# Patient Record
Sex: FEMALE | Race: WHITE | Employment: FULL TIME | URBAN - METROPOLITAN AREA
[De-identification: names, ages, dates, MRNs, and addresses within clinical notes are randomized per-mention and may not be internally consistent; named-entity substitution may affect disease eponyms.]

---

## 2017-03-09 ENCOUNTER — HOSPITAL ENCOUNTER (OUTPATIENT)
Dept: WOMENS IMAGING | Age: 62
Discharge: HOME | End: 2017-03-09
Payer: COMMERCIAL

## 2017-03-09 DIAGNOSIS — Z12.39 BREAST CANCER SCREENING: ICD-10-CM

## 2017-03-09 PROCEDURE — G0202 SCR MAMMO BI INCL CAD: HCPCS

## 2017-03-10 ENCOUNTER — HOSPITAL ENCOUNTER (EMERGENCY)
Age: 62
Discharge: HOME | End: 2017-03-10
Attending: EMERGENCY MEDICINE
Payer: COMMERCIAL

## 2017-03-10 ENCOUNTER — APPOINTMENT (OUTPATIENT)
Dept: GENERAL RADIOLOGY | Age: 62
End: 2017-03-10
Payer: COMMERCIAL

## 2017-03-10 VITALS
HEART RATE: 68 BPM | DIASTOLIC BLOOD PRESSURE: 77 MMHG | RESPIRATION RATE: 16 BRPM | SYSTOLIC BLOOD PRESSURE: 142 MMHG | OXYGEN SATURATION: 97 % | TEMPERATURE: 98.7 F

## 2017-03-10 DIAGNOSIS — M77.8 TENDINITIS OF THUMB: Primary | ICD-10-CM

## 2017-03-10 PROCEDURE — 73110 X-RAY EXAM OF WRIST: CPT

## 2017-03-10 PROCEDURE — 99283 EMERGENCY DEPT VISIT LOW MDM: CPT

## 2017-03-10 RX ORDER — NAPROXEN 500 MG/1
500 TABLET ORAL 2 TIMES DAILY
Qty: 20 TABLET | Refills: 0 | Status: SHIPPED | OUTPATIENT
Start: 2017-03-10 | End: 2018-12-13 | Stop reason: ALTCHOICE

## 2017-03-10 RX ORDER — PREDNISONE 20 MG/1
20 TABLET ORAL DAILY
Qty: 5 TABLET | Refills: 0 | Status: SHIPPED | OUTPATIENT
Start: 2017-03-10 | End: 2017-03-15

## 2017-03-10 ASSESSMENT — PAIN DESCRIPTION - FREQUENCY: FREQUENCY: CONTINUOUS

## 2017-03-10 ASSESSMENT — ENCOUNTER SYMPTOMS
VOICE CHANGE: 0
CHOKING: 0
EYE PAIN: 0
TROUBLE SWALLOWING: 0
FACIAL SWELLING: 0
BACK PAIN: 0
COUGH: 0
EYE DISCHARGE: 0
BLOOD IN STOOL: 0
CONSTIPATION: 0
SHORTNESS OF BREATH: 0
STRIDOR: 0
ABDOMINAL PAIN: 0
SINUS PRESSURE: 0
VOMITING: 0
EYE REDNESS: 0
SORE THROAT: 0
WHEEZING: 0
DIARRHEA: 0
CHEST TIGHTNESS: 0

## 2017-03-10 ASSESSMENT — PAIN DESCRIPTION - DIRECTION: RADIATING_TOWARDS: THUMB

## 2017-03-10 ASSESSMENT — PAIN DESCRIPTION - ORIENTATION: ORIENTATION: LEFT

## 2017-03-10 ASSESSMENT — PAIN DESCRIPTION - LOCATION: LOCATION: WRIST

## 2017-03-10 ASSESSMENT — PAIN DESCRIPTION - ONSET: ONSET: ON-GOING

## 2017-03-10 ASSESSMENT — PAIN DESCRIPTION - PAIN TYPE: TYPE: CHRONIC PAIN

## 2017-03-10 ASSESSMENT — PAIN SCALES - GENERAL: PAINLEVEL_OUTOF10: 3

## 2017-03-10 ASSESSMENT — PAIN DESCRIPTION - DESCRIPTORS: DESCRIPTORS: CONSTANT;SHARP

## 2018-03-14 ENCOUNTER — HOSPITAL ENCOUNTER (OUTPATIENT)
Dept: WOMENS IMAGING | Age: 63
Discharge: HOME | End: 2018-03-16
Payer: COMMERCIAL

## 2018-03-14 DIAGNOSIS — Z12.31 ENCOUNTER FOR SCREENING MAMMOGRAM FOR BREAST CANCER: ICD-10-CM

## 2018-03-14 PROCEDURE — 77067 SCR MAMMO BI INCL CAD: CPT

## 2018-08-09 ENCOUNTER — HOSPITAL ENCOUNTER (OUTPATIENT)
Age: 63
Setting detail: SPECIMEN
Discharge: HOME | End: 2018-08-09
Payer: COMMERCIAL

## 2018-08-09 ENCOUNTER — OFFICE VISIT (OUTPATIENT)
Dept: FAMILY MEDICINE CLINIC | Age: 63
End: 2018-08-09
Payer: COMMERCIAL

## 2018-08-09 VITALS
HEART RATE: 78 BPM | BODY MASS INDEX: 33.16 KG/M2 | SYSTOLIC BLOOD PRESSURE: 138 MMHG | HEIGHT: 67 IN | TEMPERATURE: 97.6 F | DIASTOLIC BLOOD PRESSURE: 88 MMHG | WEIGHT: 211.25 LBS | RESPIRATION RATE: 14 BRPM

## 2018-08-09 DIAGNOSIS — Z13.1 SCREENING FOR DIABETES MELLITUS (DM): ICD-10-CM

## 2018-08-09 DIAGNOSIS — R30.0 DYSURIA: Primary | ICD-10-CM

## 2018-08-09 DIAGNOSIS — I10 ESSENTIAL HYPERTENSION: ICD-10-CM

## 2018-08-09 DIAGNOSIS — Z12.4 CERVICAL CANCER SCREENING: ICD-10-CM

## 2018-08-09 DIAGNOSIS — R07.9 CHEST PAIN, UNSPECIFIED TYPE: ICD-10-CM

## 2018-08-09 DIAGNOSIS — Z11.59 NEED FOR HEPATITIS C SCREENING TEST: ICD-10-CM

## 2018-08-09 DIAGNOSIS — Z23 NEED FOR VACCINATION: ICD-10-CM

## 2018-08-09 DIAGNOSIS — Z12.11 COLON CANCER SCREENING: ICD-10-CM

## 2018-08-09 DIAGNOSIS — Z11.4 ENCOUNTER FOR SCREENING FOR HIV: ICD-10-CM

## 2018-08-09 PROBLEM — N19 RENAL FAILURE SYNDROME: Status: ACTIVE | Noted: 2018-08-09

## 2018-08-09 PROBLEM — J45.909 ASTHMA: Status: ACTIVE | Noted: 2018-08-09

## 2018-08-09 PROBLEM — J30.9 ALLERGIC RHINITIS: Status: ACTIVE | Noted: 2018-08-09

## 2018-08-09 PROBLEM — R33.9 RETENTION OF URINE: Status: ACTIVE | Noted: 2018-08-09

## 2018-08-09 LAB
BACTERIA: ABNORMAL /HPF
BILIRUBIN URINE: NEGATIVE
BILIRUBIN, POC: ABNORMAL
BLOOD URINE, POC: ABNORMAL
BLOOD, URINE: ABNORMAL
CLARITY, POC: ABNORMAL
CLARITY: ABNORMAL
COLOR, POC: YELLOW
COLOR: YELLOW
EPITHELIAL CELLS, UA: ABNORMAL /HPF
GLUCOSE URINE, POC: ABNORMAL
GLUCOSE URINE: NEGATIVE MG/DL
KETONES, POC: ABNORMAL
KETONES, URINE: NEGATIVE MG/DL
LEUKOCYTE EST, POC: ABNORMAL
LEUKOCYTE ESTERASE, URINE: ABNORMAL
NITRITE, POC: ABNORMAL
NITRITE, URINE: NEGATIVE
PH UA: 6 (ref 5–9)
PH, POC: 6
PROTEIN UA: ABNORMAL MG/DL
PROTEIN, POC: ABNORMAL
RBC UA: ABNORMAL /HPF (ref 0–2)
SPECIFIC GRAVITY UA: 1.01 (ref 1–1.03)
SPECIFIC GRAVITY, POC: 1.01
UROBILINOGEN, POC: 3.5
UROBILINOGEN, URINE: 0.2 E.U./DL
WBC UA: ABNORMAL /HPF (ref 0–5)

## 2018-08-09 PROCEDURE — 93000 ELECTROCARDIOGRAM COMPLETE: CPT | Performed by: FAMILY MEDICINE

## 2018-08-09 PROCEDURE — 81003 URINALYSIS AUTO W/O SCOPE: CPT | Performed by: FAMILY MEDICINE

## 2018-08-09 PROCEDURE — 87186 SC STD MICRODIL/AGAR DIL: CPT

## 2018-08-09 PROCEDURE — 87077 CULTURE AEROBIC IDENTIFY: CPT

## 2018-08-09 PROCEDURE — 87086 URINE CULTURE/COLONY COUNT: CPT

## 2018-08-09 PROCEDURE — 99203 OFFICE O/P NEW LOW 30 MIN: CPT | Performed by: FAMILY MEDICINE

## 2018-08-09 PROCEDURE — 81001 URINALYSIS AUTO W/SCOPE: CPT

## 2018-08-09 RX ORDER — AMLODIPINE BESYLATE 5 MG/1
5 TABLET ORAL DAILY
Qty: 90 TABLET | Refills: 1 | Status: SHIPPED | OUTPATIENT
Start: 2018-08-09 | End: 2018-09-13

## 2018-08-09 RX ORDER — AMLODIPINE BESYLATE 5 MG/1
5 TABLET ORAL
COMMUNITY
End: 2018-08-09 | Stop reason: SDUPTHER

## 2018-08-09 RX ORDER — SULFAMETHOXAZOLE AND TRIMETHOPRIM 800; 160 MG/1; MG/1
1 TABLET ORAL 2 TIMES DAILY
Qty: 10 TABLET | Refills: 0 | Status: SHIPPED | OUTPATIENT
Start: 2018-08-09 | End: 2018-08-14

## 2018-08-09 RX ORDER — TAMSULOSIN HYDROCHLORIDE 0.4 MG/1
CAPSULE ORAL
COMMUNITY
Start: 2018-06-24 | End: 2019-06-13 | Stop reason: ALTCHOICE

## 2018-08-09 ASSESSMENT — PATIENT HEALTH QUESTIONNAIRE - PHQ9
2. FEELING DOWN, DEPRESSED OR HOPELESS: 0
1. LITTLE INTEREST OR PLEASURE IN DOING THINGS: 0
SUM OF ALL RESPONSES TO PHQ QUESTIONS 1-9: 0
SUM OF ALL RESPONSES TO PHQ QUESTIONS 1-9: 0
SUM OF ALL RESPONSES TO PHQ9 QUESTIONS 1 & 2: 0

## 2018-08-10 ENCOUNTER — HOSPITAL ENCOUNTER (OUTPATIENT)
Dept: LAB | Age: 63
Discharge: HOME | End: 2018-08-10
Payer: COMMERCIAL

## 2018-08-10 DIAGNOSIS — Z11.4 ENCOUNTER FOR SCREENING FOR HIV: ICD-10-CM

## 2018-08-10 DIAGNOSIS — Z13.1 SCREENING FOR DIABETES MELLITUS (DM): ICD-10-CM

## 2018-08-10 DIAGNOSIS — N30.00 ACUTE CYSTITIS WITHOUT HEMATURIA: ICD-10-CM

## 2018-08-10 DIAGNOSIS — I10 ESSENTIAL HYPERTENSION: ICD-10-CM

## 2018-08-10 DIAGNOSIS — R30.0 DYSURIA: ICD-10-CM

## 2018-08-10 DIAGNOSIS — Z11.59 NEED FOR HEPATITIS C SCREENING TEST: ICD-10-CM

## 2018-08-10 LAB
ALBUMIN SERPL-MCNC: 4 G/DL (ref 3.9–4.9)
ALP BLD-CCNC: 104 U/L (ref 40–130)
ALT SERPL-CCNC: 13 U/L (ref 0–33)
ANION GAP SERPL CALCULATED.3IONS-SCNC: 14 MEQ/L (ref 7–13)
AST SERPL-CCNC: 18 U/L (ref 0–35)
BASOPHILS ABSOLUTE: 0.1 K/UL (ref 0–0.2)
BASOPHILS RELATIVE PERCENT: 0.8 %
BILIRUB SERPL-MCNC: 0.5 MG/DL (ref 0–1.2)
BUN BLDV-MCNC: 14 MG/DL (ref 8–23)
CALCIUM SERPL-MCNC: 9.1 MG/DL (ref 8.6–10.2)
CHLORIDE BLD-SCNC: 102 MEQ/L (ref 98–107)
CHOLESTEROL, TOTAL: 209 MG/DL (ref 0–199)
CO2: 25 MEQ/L (ref 22–29)
CREAT SERPL-MCNC: 1.41 MG/DL (ref 0.5–0.9)
EOSINOPHILS ABSOLUTE: 0.2 K/UL (ref 0–0.7)
EOSINOPHILS RELATIVE PERCENT: 2.5 %
GFR AFRICAN AMERICAN: 45.6
GFR NON-AFRICAN AMERICAN: 37.7
GLOBULIN: 3 G/DL (ref 2.3–3.5)
GLUCOSE BLD-MCNC: 150 MG/DL (ref 74–109)
HBA1C MFR BLD: 7.4 % (ref 4.8–5.9)
HCT VFR BLD CALC: 39.2 % (ref 37–47)
HDLC SERPL-MCNC: 54 MG/DL (ref 40–59)
HEMOGLOBIN: 13.5 G/DL (ref 12–16)
HEPATITIS C ANTIBODY INTERPRETATION: NORMAL
LDL CHOLESTEROL CALCULATED: 124 MG/DL (ref 0–129)
LYMPHOCYTES ABSOLUTE: 1.9 K/UL (ref 1–4.8)
LYMPHOCYTES RELATIVE PERCENT: 28.9 %
MCH RBC QN AUTO: 32.2 PG (ref 27–31.3)
MCHC RBC AUTO-ENTMCNC: 34.5 % (ref 33–37)
MCV RBC AUTO: 93.5 FL (ref 82–100)
MONOCYTES ABSOLUTE: 0.5 K/UL (ref 0.2–0.8)
MONOCYTES RELATIVE PERCENT: 7.3 %
NEUTROPHILS ABSOLUTE: 4.1 K/UL (ref 1.4–6.5)
NEUTROPHILS RELATIVE PERCENT: 60.5 %
PDW BLD-RTO: 13.2 % (ref 11.5–14.5)
PLATELET # BLD: 290 K/UL (ref 130–400)
POTASSIUM SERPL-SCNC: 4 MEQ/L (ref 3.5–5.1)
RBC # BLD: 4.19 M/UL (ref 4.2–5.4)
SODIUM BLD-SCNC: 141 MEQ/L (ref 132–144)
TOTAL PROTEIN: 7 G/DL (ref 6.4–8.1)
TRIGL SERPL-MCNC: 157 MG/DL (ref 0–200)
WBC # BLD: 6.7 K/UL (ref 4.8–10.8)

## 2018-08-10 PROCEDURE — 85025 COMPLETE CBC W/AUTO DIFF WBC: CPT

## 2018-08-10 PROCEDURE — 83036 HEMOGLOBIN GLYCOSYLATED A1C: CPT

## 2018-08-10 PROCEDURE — 80061 LIPID PANEL: CPT

## 2018-08-10 PROCEDURE — 80053 COMPREHEN METABOLIC PANEL: CPT

## 2018-08-10 PROCEDURE — 86803 HEPATITIS C AB TEST: CPT

## 2018-08-10 PROCEDURE — 36415 COLL VENOUS BLD VENIPUNCTURE: CPT

## 2018-08-10 PROCEDURE — 86703 HIV-1/HIV-2 1 RESULT ANTBDY: CPT

## 2018-08-12 LAB
HIV-1 AND HIV-2 ANTIBODIES: NEGATIVE
ORGANISM: ABNORMAL
URINE CULTURE, ROUTINE: ABNORMAL
URINE CULTURE, ROUTINE: ABNORMAL

## 2018-08-12 ASSESSMENT — ENCOUNTER SYMPTOMS
VOMITING: 0
CHEST TIGHTNESS: 0
BLURRED VISION: 0
DIARRHEA: 0
APNEA: 0
NAUSEA: 0
BLOOD IN STOOL: 0
SHORTNESS OF BREATH: 0
CONSTIPATION: 0
ORTHOPNEA: 0

## 2018-08-12 NOTE — PROGRESS NOTES
HENT:   Head: Normocephalic and atraumatic. Eyes: Pupils are equal, round, and reactive to light. Conjunctivae and EOM are normal.   Neck: Normal range of motion. Cardiovascular: Normal rate, regular rhythm and normal heart sounds. Exam reveals no gallop and no friction rub. No murmur heard. Pulmonary/Chest: Effort normal and breath sounds normal. No respiratory distress. She has no wheezes. She has no rales. She exhibits no tenderness. Abdominal: Soft. Bowel sounds are normal. She exhibits no distension and no mass. There is no tenderness. There is no rebound and no guarding. Neurological: She is alert and oriented to person, place, and time. Skin: Skin is warm and dry. She is not diaphoretic. Psychiatric: She has a normal mood and affect. Her behavior is normal. Judgment and thought content normal.   Nursing note and vitals reviewed. Assessment & Plan:      1. Dysuria  Treat as acute UTI due to urinalysis  Follow-up with urology  - POCT Urinalysis No Micro (Auto)  - Urinalysis; Future  - CBC Auto Differential; Future  - sulfamethoxazole-trimethoprim (BACTRIM DS) 800-160 MG per tablet; Take 1 tablet by mouth 2 times daily for 5 days  Dispense: 10 tablet; Refill: 0    2. Essential hypertension  Stable after repeat blood pressure  Continue current medications  - amLODIPine (NORVASC) 5 MG tablet; Take 1 tablet by mouth daily  Dispense: 90 tablet; Refill: 1  - CBC With Auto Differential; Future  - Comprehensive Metabolic Panel; Future  - Lipid Panel; Future  - Comprehensive Metabolic Panel; Future  - Lipid Panel; Future    3. Chest pain, unspecified type  Due to atypical nature of chest pain and cardiac history will refer to cardiologist  - EKG [address] [place] [address] MD Phil Rios) - Invasive Cardiology    4. Cervical cancer screening    - Ambulatory referral to Obstetrics / Gynecology        6.  Encounter for screening for HIV    - HIV-1,-2 w/Reflex to HIV-1 Western Blot;

## 2018-08-20 PROBLEM — R07.9 CHEST PAIN, UNSPECIFIED: Status: ACTIVE | Noted: 2018-08-20

## 2018-08-21 ENCOUNTER — OFFICE VISIT (OUTPATIENT)
Dept: CARDIOLOGY CLINIC | Age: 63
End: 2018-08-21
Payer: COMMERCIAL

## 2018-08-21 VITALS
HEART RATE: 69 BPM | RESPIRATION RATE: 12 BRPM | BODY MASS INDEX: 33.12 KG/M2 | SYSTOLIC BLOOD PRESSURE: 138 MMHG | WEIGHT: 211 LBS | OXYGEN SATURATION: 99 % | HEIGHT: 67 IN | DIASTOLIC BLOOD PRESSURE: 88 MMHG

## 2018-08-21 DIAGNOSIS — R42 DIZZINESS: ICD-10-CM

## 2018-08-21 DIAGNOSIS — R06.02 SOB (SHORTNESS OF BREATH): ICD-10-CM

## 2018-08-21 DIAGNOSIS — R51.9 NONINTRACTABLE HEADACHE, UNSPECIFIED CHRONICITY PATTERN, UNSPECIFIED HEADACHE TYPE: ICD-10-CM

## 2018-08-21 DIAGNOSIS — I10 ESSENTIAL HYPERTENSION: ICD-10-CM

## 2018-08-21 DIAGNOSIS — R07.89 OTHER CHEST PAIN: Primary | ICD-10-CM

## 2018-08-21 PROCEDURE — 99244 OFF/OP CNSLTJ NEW/EST MOD 40: CPT | Performed by: INTERNAL MEDICINE

## 2018-08-21 ASSESSMENT — ENCOUNTER SYMPTOMS
APNEA: 0
FACIAL SWELLING: 0
VOMITING: 0
CHEST TIGHTNESS: 0
TROUBLE SWALLOWING: 0
BLOOD IN STOOL: 0
ABDOMINAL DISTENTION: 0
NAUSEA: 0
WHEEZING: 0
DIARRHEA: 0
ABDOMINAL PAIN: 0
COLOR CHANGE: 0
VOICE CHANGE: 0
COUGH: 0
SHORTNESS OF BREATH: 1
ANAL BLEEDING: 0

## 2018-08-21 NOTE — PROGRESS NOTES
pulses. PMI is not displaced. No murmur heard. Pulmonary/Chest: She has no wheezes. She has no rales. She exhibits no tenderness. Abdominal: Soft. Bowel sounds are normal. She exhibits no distension and no mass. There is no splenomegaly or hepatomegaly. There is no tenderness. No hernia. Neurological: She is alert and oriented to person, place, and time. She has normal motor skills. Gait normal.   Skin: Skin is warm and dry. No cyanosis. No jaundice. Nails show no clubbing.        LABS:  CBC:   Lab Results   Component Value Date    WBC 6.7 08/10/2018    RBC 4.19 08/10/2018    HGB 13.5 08/10/2018    HCT 39.2 08/10/2018    MCV 93.5 08/10/2018    MCH 32.2 08/10/2018    MCHC 34.5 08/10/2018    RDW 13.2 08/10/2018     08/10/2018    MPV 7.5 10/12/2015     Lipids:  Lab Results   Component Value Date    CHOL 209 (H) 08/10/2018     Lab Results   Component Value Date    TRIG 157 08/10/2018     Lab Results   Component Value Date    HDL 54 08/10/2018     Lab Results   Component Value Date    LDLCALC 124 08/10/2018     No results found for: LABVLDL, VLDL  No results found for: CHOLHDLRATIO  CMP:    Lab Results   Component Value Date     08/10/2018    K 4.0 08/10/2018     08/10/2018    CO2 25 08/10/2018    BUN 14 08/10/2018    CREATININE 1.41 08/10/2018    GFRAA 45.6 08/10/2018    LABGLOM 37.7 08/10/2018    GLUCOSE 150 08/10/2018    PROT 7.0 08/10/2018    LABALBU 4.0 08/10/2018    CALCIUM 9.1 08/10/2018    BILITOT 0.5 08/10/2018    ALKPHOS 104 08/10/2018    AST 18 08/10/2018    ALT 13 08/10/2018     BMP:    Lab Results   Component Value Date     08/10/2018    K 4.0 08/10/2018     08/10/2018    CO2 25 08/10/2018    BUN 14 08/10/2018    LABALBU 4.0 08/10/2018    CREATININE 1.41 08/10/2018    CALCIUM 9.1 08/10/2018    GFRAA 45.6 08/10/2018    LABGLOM 37.7 08/10/2018    GLUCOSE 150 08/10/2018     Magnesium:  No results found for: MG  TSH:No results found for: TSHFT4, TSH      Patient Active Problem List   Diagnosis    Urethral stricture    Retention of urine    Renal insufficiency syndrome    Renal failure syndrome    Renal cyst    Pancreas cyst    Osteoarthrosis, unspecified whether generalized or localized, lower leg    Essential hypertension    Asthma    Allergic rhinitis    Chest pain, unspecified             Assessment:    1. Other chest pain  - NM Myocardial Spect Rest Exercise or Rx; Future  - Echo 2D w doppler w color complete; Future    2. SOB (shortness of breath)  - NM Myocardial Spect Rest Exercise or Rx; Future  - Echo 2D w doppler w color complete; Future    3. Dizziness  - NM Myocardial Spect Rest Exercise or Rx; Future  - Echo 2D w doppler w color complete; Future    4. Nonintractable headache, unspecified chronicity pattern, unspecified headache type  - NM Myocardial Spect Rest Exercise or Rx; Future  - Echo 2D w doppler w color complete; Future    5. Essential hypertension  - NM Myocardial Spect Rest Exercise or Rx; Future  - Echo 2D w doppler w color complete; Future       Plan:   Patient to have lexiscan stress test and echo at [facility] in 2 weeks. Stay on same medications. Patient to see me in 1 month to discuss test results. This note was partially generated using Dragon voice recognition system, and there may be some incorrect words, spellings, punctuation that were not noticed in checking the note before saving.         Electronically signed by Renee Nichole MD on 8/21/2018 at 11:02 AM

## 2018-09-04 ENCOUNTER — HOSPITAL ENCOUNTER (OUTPATIENT)
Dept: NUCLEAR MEDICINE | Age: 63
Discharge: HOME | End: 2018-09-06
Payer: COMMERCIAL

## 2018-09-04 ENCOUNTER — HOSPITAL ENCOUNTER (OUTPATIENT)
Dept: NON INVASIVE DIAGNOSTICS | Age: 63
Discharge: HOME | End: 2018-09-04
Payer: COMMERCIAL

## 2018-09-04 VITALS — SYSTOLIC BLOOD PRESSURE: 140 MMHG | DIASTOLIC BLOOD PRESSURE: 98 MMHG | HEART RATE: 80 BPM

## 2018-09-04 DIAGNOSIS — R07.89 OTHER CHEST PAIN: ICD-10-CM

## 2018-09-04 DIAGNOSIS — R51.9 NONINTRACTABLE HEADACHE, UNSPECIFIED CHRONICITY PATTERN, UNSPECIFIED HEADACHE TYPE: ICD-10-CM

## 2018-09-04 DIAGNOSIS — R06.02 SOB (SHORTNESS OF BREATH): ICD-10-CM

## 2018-09-04 DIAGNOSIS — I10 ESSENTIAL HYPERTENSION: ICD-10-CM

## 2018-09-04 DIAGNOSIS — R42 DIZZINESS: ICD-10-CM

## 2018-09-04 LAB
LV EF: 65 %
LVEF MODALITY: NORMAL

## 2018-09-04 PROCEDURE — A9502 TC99M TETROFOSMIN: HCPCS | Performed by: INTERNAL MEDICINE

## 2018-09-04 PROCEDURE — 2580000003 HC RX 258: Performed by: INTERNAL MEDICINE

## 2018-09-04 PROCEDURE — 6360000002 HC RX W HCPCS: Performed by: INTERNAL MEDICINE

## 2018-09-04 PROCEDURE — 93306 TTE W/DOPPLER COMPLETE: CPT

## 2018-09-04 PROCEDURE — 93017 CV STRESS TEST TRACING ONLY: CPT

## 2018-09-04 PROCEDURE — 3430000000 HC RX DIAGNOSTIC RADIOPHARMACEUTICAL: Performed by: INTERNAL MEDICINE

## 2018-09-04 PROCEDURE — 78452 HT MUSCLE IMAGE SPECT MULT: CPT

## 2018-09-04 RX ORDER — SODIUM CHLORIDE 0.9 % (FLUSH) 0.9 %
10 SYRINGE (ML) INJECTION 2 TIMES DAILY
Status: DISCONTINUED | OUTPATIENT
Start: 2018-09-04 | End: 2018-09-07 | Stop reason: HOSPADM

## 2018-09-04 RX ORDER — SODIUM CHLORIDE 0.9 % (FLUSH) 0.9 %
10 SYRINGE (ML) INJECTION PRN
Status: DISCONTINUED | OUTPATIENT
Start: 2018-09-04 | End: 2018-09-07 | Stop reason: HOSPADM

## 2018-09-04 RX ADMIN — Medication 10 ML: at 08:48

## 2018-09-04 RX ADMIN — TETROFOSMIN 11.2 MILLICURIE: 0.23 INJECTION, POWDER, LYOPHILIZED, FOR SOLUTION INTRAVENOUS at 08:47

## 2018-09-04 RX ADMIN — Medication 10 ML: at 11:32

## 2018-09-04 RX ADMIN — REGADENOSON 0.4 MG: 0.08 INJECTION, SOLUTION INTRAVENOUS at 11:29

## 2018-09-04 RX ADMIN — TETROFOSMIN 28.3 MILLICURIE: 0.23 INJECTION, POWDER, LYOPHILIZED, FOR SOLUTION INTRAVENOUS at 11:31

## 2018-09-04 RX ADMIN — Medication 10 ML: at 11:30

## 2018-09-06 PROCEDURE — 93018 CV STRESS TEST I&R ONLY: CPT | Performed by: INTERNAL MEDICINE

## 2018-09-06 NOTE — PROCEDURES
[address]                                CARDIAC STRESS TEST    PATIENT NAME: Bina Sparks                   :        1955  MED REC NO:   [id number]                              ROOM:  ACCOUNT NO:   [de-identified]                           ADMIT DATE: 2018  PROVIDER:     Marilyn Hart MD    CARDIOVASCULAR DIAGNOSTIC DEPARTMENT    Sagar Monsalve PROVIDER:  Miguelina Urbano. Codi Bo, [address] [place] [address] Street:  2018. LOCATION:  [address] [place] [address] Avenue:  Chest pain. TECHNIQUE:  At rest, the patient was injected with 11.2 mCi of Myoview. Resting images were obtained. The patient was then given 0.4 mg of  Lexiscan followed by administration of 28.3 mCi of Myoview. Stress  tomographic images were _____ obtained. Left ventricular ejection fraction  and gated wall motion were acquired. RESULTS:  Resting heart rate 60 beats per minute. Maximum heart rate  achieved was 108 beats per minute. No diagnostic ST-T-wave changes were  seen. Resting blood pressure was hypertensive with a systolic blood  pressure 976 mmHg over diastolic of 075 mmHg. No diagnostic ST-T-wave  changes were seen. IMAGING RESULTS:  Review of rest and stress tomographic images revealed  homogenous myocardial perfusion with no evidence of prior myocardial  infarction or ischemia. Left ventricular ejection fraction is normal at  63%. TID ratio is 0.85 which is within normal limits. IMPRESSION:  1. Resting hypertension. 2.  Homogenous myocardial perfusion with no evidence of prior myocardial  infarction or ischemia. 3.  Normal left ventricular ejection fraction of 63%. 4.  TID ratio is 0.85 which is within normal limits.         Randall Crespo MD    D: 2018 14:51:23       T: 2018 14:53:56     [email or web address]  Job#: [id number]     Doc#: [id number]    CC:

## 2018-09-13 ENCOUNTER — HOSPITAL ENCOUNTER (OUTPATIENT)
Age: 63
Setting detail: SPECIMEN
Discharge: HOME | End: 2018-09-13
Payer: COMMERCIAL

## 2018-09-13 ENCOUNTER — OFFICE VISIT (OUTPATIENT)
Dept: FAMILY MEDICINE CLINIC | Age: 63
End: 2018-09-13
Payer: COMMERCIAL

## 2018-09-13 VITALS
SYSTOLIC BLOOD PRESSURE: 122 MMHG | WEIGHT: 213.25 LBS | BODY MASS INDEX: 33.47 KG/M2 | OXYGEN SATURATION: 99 % | HEART RATE: 71 BPM | HEIGHT: 67 IN | TEMPERATURE: 97.6 F | RESPIRATION RATE: 12 BRPM | DIASTOLIC BLOOD PRESSURE: 76 MMHG

## 2018-09-13 DIAGNOSIS — E11.9 TYPE 2 DIABETES MELLITUS WITHOUT COMPLICATION, WITHOUT LONG-TERM CURRENT USE OF INSULIN (HCC): ICD-10-CM

## 2018-09-13 DIAGNOSIS — I10 ESSENTIAL HYPERTENSION: Primary | ICD-10-CM

## 2018-09-13 DIAGNOSIS — Z23 NEED FOR VACCINATION: ICD-10-CM

## 2018-09-13 DIAGNOSIS — R30.0 DYSURIA: ICD-10-CM

## 2018-09-13 DIAGNOSIS — N18.30 STAGE 3 CHRONIC KIDNEY DISEASE (HCC): ICD-10-CM

## 2018-09-13 LAB
BACTERIA: ABNORMAL /HPF
BILIRUBIN URINE: NEGATIVE
BLOOD, URINE: ABNORMAL
CLARITY: ABNORMAL
COLOR: YELLOW
CREATININE URINE: 205.4 MG/DL
EPITHELIAL CELLS, UA: ABNORMAL /HPF
GLUCOSE URINE: 100 MG/DL
KETONES, URINE: NEGATIVE MG/DL
LEUKOCYTE ESTERASE, URINE: ABNORMAL
MICROALBUMIN UR-MCNC: 15.9 MG/DL
MICROALBUMIN/CREAT UR-RTO: 77.4 MG/G (ref 0–30)
NITRITE, URINE: POSITIVE
PH UA: 5.5 (ref 5–9)
PROTEIN UA: 30 MG/DL
RBC UA: ABNORMAL /HPF (ref 0–2)
RENAL EPITHELIAL, UA: ABNORMAL /HPF
SPECIFIC GRAVITY UA: 1.02 (ref 1–1.03)
UROBILINOGEN, URINE: 0.2 E.U./DL
WBC UA: ABNORMAL /HPF (ref 0–5)

## 2018-09-13 PROCEDURE — 82043 UR ALBUMIN QUANTITATIVE: CPT

## 2018-09-13 PROCEDURE — 82570 ASSAY OF URINE CREATININE: CPT

## 2018-09-13 PROCEDURE — 81001 URINALYSIS AUTO W/SCOPE: CPT

## 2018-09-13 PROCEDURE — 99213 OFFICE O/P EST LOW 20 MIN: CPT | Performed by: FAMILY MEDICINE

## 2018-09-13 RX ORDER — AMLODIPINE BESYLATE 10 MG/1
10 TABLET ORAL DAILY
Qty: 30 TABLET | Refills: 3 | Status: SHIPPED | OUTPATIENT
Start: 2018-09-13 | End: 2018-12-13 | Stop reason: SDUPTHER

## 2018-09-14 DIAGNOSIS — N30.00 ACUTE CYSTITIS WITHOUT HEMATURIA: Primary | ICD-10-CM

## 2018-09-14 RX ORDER — SULFAMETHOXAZOLE AND TRIMETHOPRIM 800; 160 MG/1; MG/1
1 TABLET ORAL 2 TIMES DAILY
Qty: 14 TABLET | Refills: 0 | Status: SHIPPED | OUTPATIENT
Start: 2018-09-14 | End: 2018-09-21

## 2018-09-17 ENCOUNTER — TELEPHONE (OUTPATIENT)
Dept: FAMILY MEDICINE CLINIC | Age: 63
End: 2018-09-17

## 2018-09-17 NOTE — TELEPHONE ENCOUNTER
Patient called with BP readings. Today at 8 am, 130/85 and again at today at 9:30 am 128/83. Please advise.

## 2018-09-19 ASSESSMENT — ENCOUNTER SYMPTOMS
CONSTIPATION: 0
APNEA: 0
ORTHOPNEA: 0
DIARRHEA: 0
COUGH: 0
BLOOD IN STOOL: 0
CHEST TIGHTNESS: 0
VOMITING: 0
NAUSEA: 0
BLURRED VISION: 0
SHORTNESS OF BREATH: 0
ABDOMINAL PAIN: 0

## 2018-09-25 ENCOUNTER — OFFICE VISIT (OUTPATIENT)
Dept: CARDIOLOGY CLINIC | Age: 63
End: 2018-09-25
Payer: COMMERCIAL

## 2018-09-25 VITALS
WEIGHT: 212.6 LBS | TEMPERATURE: 98.6 F | HEIGHT: 67 IN | BODY MASS INDEX: 33.37 KG/M2 | HEART RATE: 90 BPM | OXYGEN SATURATION: 98 % | DIASTOLIC BLOOD PRESSURE: 84 MMHG | SYSTOLIC BLOOD PRESSURE: 122 MMHG | RESPIRATION RATE: 22 BRPM

## 2018-09-25 DIAGNOSIS — I10 ESSENTIAL HYPERTENSION: Primary | ICD-10-CM

## 2018-09-25 PROCEDURE — 99213 OFFICE O/P EST LOW 20 MIN: CPT | Performed by: INTERNAL MEDICINE

## 2018-09-25 ASSESSMENT — ENCOUNTER SYMPTOMS
COLOR CHANGE: 0
NAUSEA: 0
APNEA: 0
CHEST TIGHTNESS: 0
BLOOD IN STOOL: 0
SHORTNESS OF BREATH: 0
DIARRHEA: 0
VOMITING: 0

## 2018-09-25 NOTE — PROGRESS NOTES
Retention of urine    Renal insufficiency syndrome    Renal failure syndrome    Renal cyst    Pancreas cyst    Osteoarthrosis, unspecified whether generalized or localized, lower leg    Essential hypertension    Asthma    Allergic rhinitis    Chest pain, unspecified       There are no discontinued medications. Modified Medications    No medications on file       No orders of the defined types were placed in this encounter. Assessment:    1. Essential hypertension       Plan:   Stay on same medications. Patient to see me in 1 year. This note was partially generated using Dragon voice recognition system, and there may be some incorrect words, spellings, punctuation that were not noticed in checking the note before saving.         Electronically signed by Santiago Karimi MD on 9/25/2018 at 2:28 PM

## 2018-10-20 ENCOUNTER — HOSPITAL ENCOUNTER (OUTPATIENT)
Dept: LAB | Age: 63
Discharge: HOME | End: 2018-10-20
Payer: COMMERCIAL

## 2018-10-20 DIAGNOSIS — N18.30 STAGE 3 CHRONIC KIDNEY DISEASE (HCC): ICD-10-CM

## 2018-10-20 LAB
ALBUMIN SERPL-MCNC: 4.3 G/DL (ref 3.9–4.9)
ANION GAP SERPL CALCULATED.3IONS-SCNC: 15 MEQ/L (ref 7–13)
ANION GAP SERPL CALCULATED.3IONS-SCNC: 19 MEQ/L (ref 7–13)
BACTERIA: ABNORMAL /HPF
BILIRUBIN URINE: NEGATIVE
BLOOD, URINE: ABNORMAL
BUN BLDV-MCNC: 15 MG/DL (ref 8–23)
BUN BLDV-MCNC: 15 MG/DL (ref 8–23)
CALCIUM SERPL-MCNC: 9.3 MG/DL (ref 8.6–10.2)
CALCIUM SERPL-MCNC: 9.3 MG/DL (ref 8.6–10.2)
CHLORIDE BLD-SCNC: 100 MEQ/L (ref 98–107)
CHLORIDE BLD-SCNC: 102 MEQ/L (ref 98–107)
CLARITY: CLEAR
CO2: 21 MEQ/L (ref 22–29)
CO2: 24 MEQ/L (ref 22–29)
COLOR: YELLOW
CREAT SERPL-MCNC: 1.2 MG/DL (ref 0.5–0.9)
CREAT SERPL-MCNC: 1.26 MG/DL (ref 0.5–0.9)
GFR AFRICAN AMERICAN: 51.9
GFR AFRICAN AMERICAN: 54.9
GFR NON-AFRICAN AMERICAN: 42.9
GFR NON-AFRICAN AMERICAN: 45.3
GLUCOSE BLD-MCNC: 153 MG/DL (ref 74–109)
GLUCOSE BLD-MCNC: 161 MG/DL (ref 74–109)
GLUCOSE URINE: NEGATIVE MG/DL
HCT VFR BLD CALC: 41.4 % (ref 37–47)
HEMOGLOBIN: 14.1 G/DL (ref 12–16)
KETONES, URINE: NEGATIVE MG/DL
LEUKOCYTE ESTERASE, URINE: ABNORMAL
MCH RBC QN AUTO: 32.2 PG (ref 27–31.3)
MCHC RBC AUTO-ENTMCNC: 34.1 % (ref 33–37)
MCV RBC AUTO: 94.4 FL (ref 82–100)
NITRITE, URINE: NEGATIVE
PDW BLD-RTO: 13.3 % (ref 11.5–14.5)
PH UA: 7 (ref 5–9)
PHOSPHORUS: 3.7 MG/DL (ref 2.5–4.5)
PLATELET # BLD: 300 K/UL (ref 130–400)
POTASSIUM SERPL-SCNC: 3.9 MEQ/L (ref 3.5–5.1)
POTASSIUM SERPL-SCNC: 3.9 MEQ/L (ref 3.5–5.1)
PROTEIN UA: ABNORMAL MG/DL
RBC # BLD: 4.38 M/UL (ref 4.2–5.4)
RBC UA: ABNORMAL /HPF (ref 0–2)
RENAL EPITHELIAL, UA: ABNORMAL /HPF
SODIUM BLD-SCNC: 139 MEQ/L (ref 132–144)
SODIUM BLD-SCNC: 142 MEQ/L (ref 132–144)
SPECIFIC GRAVITY UA: 1.02 (ref 1–1.03)
UROBILINOGEN, URINE: 0.2 E.U./DL
WBC # BLD: 5.8 K/UL (ref 4.8–10.8)
WBC UA: ABNORMAL /HPF (ref 0–5)

## 2018-10-20 PROCEDURE — 81001 URINALYSIS AUTO W/SCOPE: CPT

## 2018-10-20 PROCEDURE — 85027 COMPLETE CBC AUTOMATED: CPT

## 2018-10-20 PROCEDURE — 80069 RENAL FUNCTION PANEL: CPT

## 2018-10-20 PROCEDURE — 36415 COLL VENOUS BLD VENIPUNCTURE: CPT

## 2018-12-13 ENCOUNTER — OFFICE VISIT (OUTPATIENT)
Dept: FAMILY MEDICINE CLINIC | Age: 63
End: 2018-12-13
Payer: COMMERCIAL

## 2018-12-13 VITALS
SYSTOLIC BLOOD PRESSURE: 140 MMHG | WEIGHT: 206 LBS | BODY MASS INDEX: 32.33 KG/M2 | DIASTOLIC BLOOD PRESSURE: 80 MMHG | TEMPERATURE: 97.3 F | HEIGHT: 67 IN | OXYGEN SATURATION: 97 % | RESPIRATION RATE: 12 BRPM | HEART RATE: 58 BPM

## 2018-12-13 DIAGNOSIS — E11.9 TYPE 2 DIABETES MELLITUS WITHOUT COMPLICATION, WITHOUT LONG-TERM CURRENT USE OF INSULIN (HCC): Primary | ICD-10-CM

## 2018-12-13 DIAGNOSIS — E78.5 DYSLIPIDEMIA: ICD-10-CM

## 2018-12-13 DIAGNOSIS — I10 ESSENTIAL HYPERTENSION: ICD-10-CM

## 2018-12-13 PROCEDURE — 99214 OFFICE O/P EST MOD 30 MIN: CPT | Performed by: FAMILY MEDICINE

## 2018-12-13 RX ORDER — AMLODIPINE BESYLATE 10 MG/1
10 TABLET ORAL DAILY
Qty: 90 TABLET | Refills: 1 | Status: SHIPPED | OUTPATIENT
Start: 2018-12-13 | End: 2019-06-13 | Stop reason: SDUPTHER

## 2018-12-13 NOTE — PROGRESS NOTES
Tobacco smoker: No      Systolic Blood Pressure: 724 mmHg      Is BP treated: Yes      HDL Cholesterol: 54 mg/dL      Total Cholesterol: 209 mg/dL  There are no diagnoses linked to this encounter. No Follow-up on file.     Bijan Cueto MD

## 2018-12-21 ASSESSMENT — ENCOUNTER SYMPTOMS
ABDOMINAL PAIN: 0
VOMITING: 0
ORTHOPNEA: 0
BLURRED VISION: 0
NAUSEA: 0
CHEST TIGHTNESS: 0
APNEA: 0
CONSTIPATION: 0
DIARRHEA: 0
VISUAL CHANGE: 0
BLOOD IN STOOL: 0
COUGH: 0
SHORTNESS OF BREATH: 0

## 2019-03-19 ENCOUNTER — HOSPITAL ENCOUNTER (OUTPATIENT)
Dept: WOMENS IMAGING | Age: 64
Discharge: HOME | End: 2019-03-21
Payer: COMMERCIAL

## 2019-03-19 DIAGNOSIS — Z12.39 BREAST CANCER SCREENING: ICD-10-CM

## 2019-03-19 PROCEDURE — 77067 SCR MAMMO BI INCL CAD: CPT

## 2019-06-13 ENCOUNTER — OFFICE VISIT (OUTPATIENT)
Dept: FAMILY MEDICINE CLINIC | Age: 64
End: 2019-06-13
Payer: COMMERCIAL

## 2019-06-13 VITALS
WEIGHT: 206.8 LBS | SYSTOLIC BLOOD PRESSURE: 128 MMHG | OXYGEN SATURATION: 96 % | HEIGHT: 67 IN | TEMPERATURE: 98 F | DIASTOLIC BLOOD PRESSURE: 78 MMHG | RESPIRATION RATE: 14 BRPM | HEART RATE: 89 BPM | BODY MASS INDEX: 32.46 KG/M2

## 2019-06-13 DIAGNOSIS — N18.30 CKD (CHRONIC KIDNEY DISEASE), STAGE III (HCC): ICD-10-CM

## 2019-06-13 DIAGNOSIS — E11.9 TYPE 2 DIABETES MELLITUS WITHOUT COMPLICATION, WITHOUT LONG-TERM CURRENT USE OF INSULIN (HCC): Primary | ICD-10-CM

## 2019-06-13 DIAGNOSIS — I10 ESSENTIAL HYPERTENSION: ICD-10-CM

## 2019-06-13 LAB — HBA1C MFR BLD: 8.5 %

## 2019-06-13 PROCEDURE — 99214 OFFICE O/P EST MOD 30 MIN: CPT | Performed by: FAMILY MEDICINE

## 2019-06-13 PROCEDURE — 83036 HEMOGLOBIN GLYCOSYLATED A1C: CPT | Performed by: FAMILY MEDICINE

## 2019-06-13 RX ORDER — FLUTICASONE PROPIONATE 50 MCG
1 SPRAY, SUSPENSION (ML) NASAL DAILY
Qty: 1 BOTTLE | Refills: 0 | Status: SHIPPED | OUTPATIENT
Start: 2019-06-13 | End: 2020-11-17

## 2019-06-13 RX ORDER — AMLODIPINE BESYLATE 10 MG/1
10 TABLET ORAL DAILY
Qty: 90 TABLET | Refills: 1 | Status: SHIPPED | OUTPATIENT
Start: 2019-06-13 | End: 2020-01-22

## 2019-06-13 RX ORDER — FLUTICASONE PROPIONATE 50 MCG
1 SPRAY, SUSPENSION (ML) NASAL
COMMUNITY
Start: 2013-04-12 | End: 2019-06-13 | Stop reason: SDUPTHER

## 2019-06-13 RX ORDER — LISINOPRIL 20 MG/1
10 TABLET ORAL
COMMUNITY
Start: 2014-09-16 | End: 2019-06-13 | Stop reason: ALTCHOICE

## 2019-06-13 RX ORDER — BLOOD PRESSURE TEST KIT
KIT MISCELLANEOUS
Qty: 1 EACH | Refills: 5 | Status: SHIPPED | OUTPATIENT
Start: 2019-06-13

## 2019-06-13 RX ORDER — LANCETS 30 GAUGE
EACH MISCELLANEOUS
Qty: 1 EACH | Refills: 5 | Status: SHIPPED | OUTPATIENT
Start: 2019-06-13 | End: 2020-08-07 | Stop reason: SDUPTHER

## 2019-06-13 RX ORDER — GLUCOSAMINE HCL/CHONDROITIN SU 500-400 MG
CAPSULE ORAL
Qty: 100 STRIP | Refills: 5 | Status: SHIPPED | OUTPATIENT
Start: 2019-06-13 | End: 2020-08-07 | Stop reason: SDUPTHER

## 2019-06-13 NOTE — PROGRESS NOTES
Subjective:      Patient ID: Chrissy Canchola is a 61 y.o. female who presents today for:     Chief Complaint   Patient presents with    Hypertension     Patient presents with 6 month follow up HTN, is currently taking amlodipine 10 MG daily with no side effects.  Diabetes     Patient presents with 6 month follow up and to have her A1C rechecked. Hypertension   This is a chronic problem. The problem is controlled. Pertinent negatives include no anxiety, blurred vision, chest pain, headaches, malaise/fatigue, neck pain, orthopnea, palpitations, peripheral edema, PND, shortness of breath or sweats. The current treatment provides significant improvement. Hypertensive end-organ damage includes kidney disease. There is no history of angina, CAD/MI, CVA, heart failure, left ventricular hypertrophy, PVD or retinopathy. Diabetes   She presents for her follow-up diabetic visit. She has type 2 diabetes mellitus. Her disease course has been worsening. There are no hypoglycemic associated symptoms. Pertinent negatives for hypoglycemia include no confusion, dizziness, headaches, seizures or sweats. Pertinent negatives for diabetes include no blurred vision, no chest pain, no fatigue, no foot paresthesias, no foot ulcerations, no polydipsia, no polyphagia, no polyuria, no visual change, no weakness and no weight loss. Pertinent negatives for diabetic complications include no CVA, PVD or retinopathy. Current diabetic treatment includes diet. She is following a generally healthy diet. When asked about meal planning, she reported none. She rarely participates in exercise. An ACE inhibitor/angiotensin II receptor blocker is being taken. Eye exam is not current.        Past Medical History:   Diagnosis Date    Allergic rhinitis     Asthma     Chest pain, unspecified 8/20/2018    Hypertension     Kidney problem     blockage in urethra and urine backs up has to get urethra dilated, has scar tissue     Past Surgical History:   Procedure Laterality Date    CHOLECYSTECTOMY  1990    HYSTERECTOMY  2011     No family history on file. Social History     Socioeconomic History    Marital status:      Spouse name: Not on file    Number of children: Not on file    Years of education: Not on file    Highest education level: Not on file   Occupational History    Not on file   Social Needs    Financial resource strain: Not on file    Food insecurity:     Worry: Not on file     Inability: Not on file    Transportation needs:     Medical: Not on file     Non-medical: Not on file   Tobacco Use    Smoking status: Never Smoker    Smokeless tobacco: Never Used   Substance and Sexual Activity    Alcohol use: No    Drug use: No    Sexual activity: Not on file   Lifestyle    Physical activity:     Days per week: Not on file     Minutes per session: Not on file    Stress: Not on file   Relationships    Social connections:     Talks on phone: Not on file     Gets together: Not on file     Attends Sabianism service: Not on file     Active member of club or organization: Not on file     Attends meetings of clubs or organizations: Not on file     Relationship status: Not on file    Intimate partner violence:     Fear of current or ex partner: Not on file     Emotionally abused: Not on file     Physically abused: Not on file     Forced sexual activity: Not on file   Other Topics Concern    Not on file   Social History Narrative    Not on file     Current Outpatient Medications on File Prior to Visit   Medication Sig Dispense Refill    aspirin 81 MG tablet Take 81 mg by mouth daily       No current facility-administered medications on file prior to visit. Allergies:  Ciprofloxacin    Review of Systems   Constitutional: Negative for activity change, appetite change, fatigue, malaise/fatigue and weight loss. Eyes: Negative for blurred vision.    Respiratory: Negative for apnea, cough, chest tightness and shortness of

## 2019-06-18 ENCOUNTER — TELEPHONE (OUTPATIENT)
Dept: FAMILY MEDICINE CLINIC | Age: 64
End: 2019-06-18

## 2019-06-18 DIAGNOSIS — N18.30 TYPE 2 DIABETES MELLITUS WITH STAGE 3 CHRONIC KIDNEY DISEASE, WITHOUT LONG-TERM CURRENT USE OF INSULIN (HCC): Primary | Chronic | ICD-10-CM

## 2019-06-18 DIAGNOSIS — E11.22 TYPE 2 DIABETES MELLITUS WITH STAGE 3 CHRONIC KIDNEY DISEASE, WITHOUT LONG-TERM CURRENT USE OF INSULIN (HCC): Primary | Chronic | ICD-10-CM

## 2019-06-18 NOTE — TELEPHONE ENCOUNTER
Patient called wanted to know if you talked to Dr Imtiaz Solano to see what kind of medication to give her. She has been waiting to see what medication that you are going to give her from talking to Dr Imtiaz Solano. cp

## 2019-06-20 ASSESSMENT — ENCOUNTER SYMPTOMS
SHORTNESS OF BREATH: 0
COUGH: 0
BLOOD IN STOOL: 0
BLURRED VISION: 0
ABDOMINAL PAIN: 0
APNEA: 0
NAUSEA: 0
CHEST TIGHTNESS: 0
VISUAL CHANGE: 0
VOMITING: 0
DIARRHEA: 0
CONSTIPATION: 0
ORTHOPNEA: 0

## 2019-06-20 NOTE — TELEPHONE ENCOUNTER
Called Dr. David Chiang office at [phone] and they are sending message to him now and will call us back to let us know what he says.

## 2019-06-20 NOTE — TELEPHONE ENCOUNTER
Can we please call Dr. Can Fitting office.  Patient is a new diabetic and I would like to know whether glipizide 2.5 mg extended release is an option  As metformin cannot be used due to last kidney function obtained

## 2019-06-21 PROBLEM — E11.22 TYPE 2 DIABETES MELLITUS WITH CHRONIC KIDNEY DISEASE, WITHOUT LONG-TERM CURRENT USE OF INSULIN (HCC): Chronic | Status: ACTIVE | Noted: 2019-06-21

## 2019-06-21 RX ORDER — ATORVASTATIN CALCIUM 10 MG/1
10 TABLET, FILM COATED ORAL DAILY
Qty: 30 TABLET | Refills: 1 | Status: SHIPPED | OUTPATIENT
Start: 2019-06-21 | End: 2019-08-14 | Stop reason: SDUPTHER

## 2019-06-21 RX ORDER — GLIPIZIDE 2.5 MG/1
2.5 TABLET, EXTENDED RELEASE ORAL DAILY
Qty: 30 TABLET | Refills: 1 | Status: SHIPPED | OUTPATIENT
Start: 2019-06-21 | End: 2019-08-14 | Stop reason: SDUPTHER

## 2019-06-21 NOTE — TELEPHONE ENCOUNTER
Patient is aware. She said it was also supposed to be for the cholesterol medication too. She would like these medications sent to [address] [facility] in [facility].

## 2019-06-21 NOTE — TELEPHONE ENCOUNTER
Patient can start glipizide 2.5 mg extended release per nephrology.  Please check with patient and we will place the order for the medication

## 2019-07-06 ENCOUNTER — HOSPITAL ENCOUNTER (OUTPATIENT)
Dept: LAB | Age: 64
Discharge: HOME | End: 2019-07-06
Payer: COMMERCIAL

## 2019-07-06 LAB
ANION GAP SERPL CALCULATED.3IONS-SCNC: 13 MEQ/L (ref 9–15)
BUN BLDV-MCNC: 15 MG/DL (ref 8–23)
CALCIUM SERPL-MCNC: 9 MG/DL (ref 8.5–9.9)
CHLORIDE BLD-SCNC: 105 MEQ/L (ref 95–107)
CO2: 24 MEQ/L (ref 20–31)
CREAT SERPL-MCNC: 1.36 MG/DL (ref 0.5–0.9)
GFR AFRICAN AMERICAN: 47.4
GFR NON-AFRICAN AMERICAN: 39.2
GLUCOSE BLD-MCNC: 154 MG/DL (ref 70–99)
POTASSIUM SERPL-SCNC: 4.3 MEQ/L (ref 3.4–4.9)
SODIUM BLD-SCNC: 142 MEQ/L (ref 135–144)

## 2019-07-06 PROCEDURE — 36415 COLL VENOUS BLD VENIPUNCTURE: CPT

## 2019-07-06 PROCEDURE — 80048 BASIC METABOLIC PNL TOTAL CA: CPT

## 2019-08-01 ENCOUNTER — OFFICE VISIT (OUTPATIENT)
Dept: OBGYN CLINIC | Age: 64
End: 2019-08-01
Payer: COMMERCIAL

## 2019-08-01 VITALS — SYSTOLIC BLOOD PRESSURE: 130 MMHG | DIASTOLIC BLOOD PRESSURE: 84 MMHG | BODY MASS INDEX: 31.97 KG/M2 | WEIGHT: 204.1 LBS

## 2019-08-01 DIAGNOSIS — Z11.51 SCREENING FOR HPV (HUMAN PAPILLOMAVIRUS): ICD-10-CM

## 2019-08-01 DIAGNOSIS — Z85.42 H/O MALIGNANT NEOPLASM OF ENDOMETRIUM: ICD-10-CM

## 2019-08-01 DIAGNOSIS — Z01.419 WOMEN'S ANNUAL ROUTINE GYNECOLOGICAL EXAMINATION: Primary | ICD-10-CM

## 2019-08-01 PROCEDURE — 99386 PREV VISIT NEW AGE 40-64: CPT | Performed by: OBSTETRICS & GYNECOLOGY

## 2019-08-01 ASSESSMENT — ENCOUNTER SYMPTOMS
SORE THROAT: 0
SHORTNESS OF BREATH: 0
CONSTIPATION: 0
COUGH: 0
ABDOMINAL DISTENTION: 0
ABDOMINAL PAIN: 0
BLOOD IN STOOL: 0
DIARRHEA: 0
VOMITING: 0
NAUSEA: 0
WHEEZING: 0

## 2019-08-01 ASSESSMENT — PATIENT HEALTH QUESTIONNAIRE - PHQ9
SUM OF ALL RESPONSES TO PHQ QUESTIONS 1-9: 0
SUM OF ALL RESPONSES TO PHQ QUESTIONS 1-9: 0
2. FEELING DOWN, DEPRESSED OR HOPELESS: 0
SUM OF ALL RESPONSES TO PHQ9 QUESTIONS 1 & 2: 0
1. LITTLE INTEREST OR PLEASURE IN DOING THINGS: 0

## 2019-08-01 NOTE — PROGRESS NOTES
The patient was asked if she would like a chaperone present for her intimate exam. She  Declined the chaperone.  Cande Kunz
easily. Psychiatric/Behavioral: Negative for agitation, confusion and sleep disturbance. All other systems reviewed and are negative. Objective:     Vitals:  /84   Wt 204 lb 1.6 oz (92.6 kg)   LMP  (LMP Unknown)   BMI 31.97 kg/m²     Physical Exam  Physical Exam   Constitutional: She is oriented to person, place, and time. She appears well-developed and well-nourished. No distress. HENT:   Head: Normocephalic. Right Ear: External ear normal.   Left Ear: External ear normal.   Nose: Nose normal.   Eyes: Pupils are equal, round, and reactive to light. Conjunctivae and EOM are normal.   Neck: No tracheal deviation present. No thyromegaly present. Cardiovascular: Normal rate, regular rhythm, normal heart sounds and intact distal pulses. Exam reveals no gallop and no friction rub. No murmur heard. Pulmonary/Chest: Effort normal and breath sounds normal. No respiratory distress. She has no wheezes. She has no rales. She exhibits no tenderness. Right breast exhibits no inverted nipple, no mass and no tenderness. Left breast exhibits no inverted nipple, no mass and no tenderness. No breast tenderness, discharge or bleeding. Abdominal: Soft. Bowel sounds are normal. She exhibits no distension and no mass. There is no tenderness. There is no rebound and no guarding. Genitourinary: Vagina normal. No breast tenderness, discharge or bleeding. There is no rash, tenderness or lesion on the right labia. There is no rash, tenderness or lesion on the left labia. Right adnexum displays no mass. Left adnexum displays no mass. No erythema, tenderness or bleeding in the vagina. No vaginal discharge found. Genitourinary Comments: Uterus is not prolapsed and there is not a cystocele or rectocele noted   Musculoskeletal: She exhibits no edema. Lymphadenopathy:        Right: No inguinal adenopathy present. Left: No inguinal adenopathy present.    Neurological: She is alert and oriented to person,

## 2019-08-07 ENCOUNTER — TELEPHONE (OUTPATIENT)
Dept: OBGYN CLINIC | Age: 64
End: 2019-08-07

## 2019-08-07 LAB — PAP SMEAR: NORMAL

## 2019-08-07 RX ORDER — NYSTATIN 100000 U/G
CREAM TOPICAL
Qty: 1 TUBE | Refills: 1 | Status: SHIPPED | OUTPATIENT
Start: 2019-08-07 | End: 2019-08-24 | Stop reason: SDUPTHER

## 2019-08-14 DIAGNOSIS — N18.30 TYPE 2 DIABETES MELLITUS WITH STAGE 3 CHRONIC KIDNEY DISEASE, WITHOUT LONG-TERM CURRENT USE OF INSULIN (HCC): Chronic | ICD-10-CM

## 2019-08-14 DIAGNOSIS — E11.22 TYPE 2 DIABETES MELLITUS WITH STAGE 3 CHRONIC KIDNEY DISEASE, WITHOUT LONG-TERM CURRENT USE OF INSULIN (HCC): Chronic | ICD-10-CM

## 2019-08-19 RX ORDER — GLIPIZIDE 2.5 MG/1
2.5 TABLET, EXTENDED RELEASE ORAL DAILY
Qty: 90 TABLET | Refills: 1 | Status: SHIPPED | OUTPATIENT
Start: 2019-08-19 | End: 2019-08-19 | Stop reason: SDUPTHER

## 2019-08-19 RX ORDER — ATORVASTATIN CALCIUM 10 MG/1
10 TABLET, FILM COATED ORAL DAILY
Qty: 90 TABLET | Refills: 0 | Status: SHIPPED | OUTPATIENT
Start: 2019-08-19 | End: 2020-02-12

## 2019-08-19 RX ORDER — ATORVASTATIN CALCIUM 10 MG/1
10 TABLET, FILM COATED ORAL DAILY
Qty: 90 TABLET | Refills: 0 | Status: SHIPPED | OUTPATIENT
Start: 2019-08-19 | End: 2019-08-19 | Stop reason: SDUPTHER

## 2019-08-19 RX ORDER — GLIPIZIDE 2.5 MG/1
2.5 TABLET, EXTENDED RELEASE ORAL DAILY
Qty: 90 TABLET | Refills: 1 | Status: SHIPPED | OUTPATIENT
Start: 2019-08-19 | End: 2020-08-12

## 2019-08-24 RX ORDER — NYSTATIN 100000 U/G
CREAM TOPICAL
Qty: 1 TUBE | Refills: 1 | Status: SHIPPED | OUTPATIENT
Start: 2019-08-24 | End: 2020-09-18 | Stop reason: SDUPTHER

## 2019-11-06 ENCOUNTER — TELEPHONE (OUTPATIENT)
Dept: FAMILY MEDICINE CLINIC | Age: 64
End: 2019-11-06

## 2019-11-08 DIAGNOSIS — N28.1 RENAL CYST: Primary | ICD-10-CM

## 2019-11-08 DIAGNOSIS — N35.92 STRICTURE OF FEMALE URETHRA, UNSPECIFIED STRICTURE TYPE: ICD-10-CM

## 2019-11-18 ENCOUNTER — OFFICE VISIT (OUTPATIENT)
Dept: UROLOGY | Age: 64
End: 2019-11-18
Payer: COMMERCIAL

## 2019-11-18 VITALS
WEIGHT: 197 LBS | BODY MASS INDEX: 30.92 KG/M2 | HEART RATE: 57 BPM | DIASTOLIC BLOOD PRESSURE: 84 MMHG | HEIGHT: 67 IN | SYSTOLIC BLOOD PRESSURE: 138 MMHG

## 2019-11-18 DIAGNOSIS — N35.92 STRICTURE OF FEMALE URETHRA, UNSPECIFIED STRICTURE TYPE: Primary | ICD-10-CM

## 2019-11-18 PROCEDURE — 99203 OFFICE O/P NEW LOW 30 MIN: CPT | Performed by: UROLOGY

## 2019-11-18 RX ORDER — TAMSULOSIN HYDROCHLORIDE 0.4 MG/1
CAPSULE ORAL
Refills: 3 | COMMUNITY
Start: 2019-09-12 | End: 2020-11-17

## 2019-11-20 ENCOUNTER — TELEPHONE (OUTPATIENT)
Dept: UROLOGY | Age: 64
End: 2019-11-20

## 2019-12-04 ENCOUNTER — HOSPITAL ENCOUNTER (OUTPATIENT)
Dept: LAB | Age: 64
Discharge: HOME | End: 2019-12-04
Payer: COMMERCIAL

## 2019-12-04 LAB
ALBUMIN SERPL-MCNC: 4.2 G/DL (ref 3.5–4.6)
ANION GAP SERPL CALCULATED.3IONS-SCNC: 13 MEQ/L (ref 9–15)
BACTERIA: ABNORMAL /HPF
BILIRUBIN URINE: NEGATIVE
BLOOD, URINE: NEGATIVE
BUN BLDV-MCNC: 19 MG/DL (ref 8–23)
CALCIUM SERPL-MCNC: 9.6 MG/DL (ref 8.5–9.9)
CHLORIDE BLD-SCNC: 102 MEQ/L (ref 95–107)
CLARITY: ABNORMAL
CO2: 26 MEQ/L (ref 20–31)
COLOR: YELLOW
CREAT SERPL-MCNC: 1.34 MG/DL (ref 0.5–0.9)
CREATININE URINE: 119.1 MG/DL
EPITHELIAL CELLS, UA: ABNORMAL /HPF (ref 0–5)
GFR AFRICAN AMERICAN: 48.1
GFR NON-AFRICAN AMERICAN: 39.8
GLUCOSE BLD-MCNC: 123 MG/DL (ref 70–99)
GLUCOSE URINE: NEGATIVE MG/DL
HCT VFR BLD CALC: 40.6 % (ref 37–47)
HEMOGLOBIN: 13.5 G/DL (ref 12–16)
HYALINE CASTS: ABNORMAL /HPF (ref 0–5)
KETONES, URINE: NEGATIVE MG/DL
LEUKOCYTE ESTERASE, URINE: ABNORMAL
MCH RBC QN AUTO: 31.6 PG (ref 27–31.3)
MCHC RBC AUTO-ENTMCNC: 33.2 % (ref 33–37)
MCV RBC AUTO: 95.1 FL (ref 82–100)
NITRITE, URINE: NEGATIVE
PARATHYROID HORMONE INTACT: 80.4 PG/ML (ref 15–65)
PDW BLD-RTO: 12.9 % (ref 11.5–14.5)
PH UA: 6 (ref 5–9)
PHOSPHORUS: 3.8 MG/DL (ref 2.3–4.8)
PLATELET # BLD: 271 K/UL (ref 130–400)
POTASSIUM SERPL-SCNC: 4.2 MEQ/L (ref 3.4–4.9)
PROTEIN PROTEIN: 36 MG/DL
PROTEIN UA: ABNORMAL MG/DL
PROTEIN/CREAT RATIO: 0.3 ML/ML
PROTEIN/CREAT RATIO: 0.3 ML/ML (ref 0–0.2)
RBC # BLD: 4.27 M/UL (ref 4.2–5.4)
RBC UA: ABNORMAL /HPF (ref 0–5)
SODIUM BLD-SCNC: 141 MEQ/L (ref 135–144)
SPECIFIC GRAVITY UA: 1.01 (ref 1–1.03)
UROBILINOGEN, URINE: 0.2 E.U./DL
VITAMIN D 25-HYDROXY: 41.9 NG/ML (ref 30–100)
WBC # BLD: 6.6 K/UL (ref 4.8–10.8)
WBC UA: >100 /HPF (ref 0–5)

## 2019-12-04 PROCEDURE — 85027 COMPLETE CBC AUTOMATED: CPT

## 2019-12-04 PROCEDURE — 84156 ASSAY OF PROTEIN URINE: CPT

## 2019-12-04 PROCEDURE — 80069 RENAL FUNCTION PANEL: CPT

## 2019-12-04 PROCEDURE — 81001 URINALYSIS AUTO W/SCOPE: CPT

## 2019-12-04 PROCEDURE — 83970 ASSAY OF PARATHORMONE: CPT

## 2019-12-04 PROCEDURE — 36415 COLL VENOUS BLD VENIPUNCTURE: CPT

## 2019-12-04 PROCEDURE — 82306 VITAMIN D 25 HYDROXY: CPT

## 2019-12-09 RX ORDER — TAMSULOSIN HYDROCHLORIDE 0.4 MG/1
0.4 CAPSULE ORAL DAILY
Qty: 90 CAPSULE | Refills: 3 | Status: SHIPPED | OUTPATIENT
Start: 2019-12-09 | End: 2021-03-30

## 2019-12-16 ENCOUNTER — TELEPHONE (OUTPATIENT)
Dept: FAMILY MEDICINE CLINIC | Age: 64
End: 2019-12-16

## 2019-12-17 DIAGNOSIS — N18.30 CKD (CHRONIC KIDNEY DISEASE), STAGE III (HCC): Primary | ICD-10-CM

## 2020-01-22 RX ORDER — AMLODIPINE BESYLATE 10 MG/1
10 TABLET ORAL DAILY
Qty: 90 TABLET | Refills: 0 | Status: SHIPPED | OUTPATIENT
Start: 2020-01-22 | End: 2020-05-14

## 2020-02-12 RX ORDER — ATORVASTATIN CALCIUM 10 MG/1
10 TABLET, FILM COATED ORAL DAILY
Qty: 90 TABLET | Refills: 0 | Status: SHIPPED | OUTPATIENT
Start: 2020-02-12 | End: 2020-04-28

## 2020-02-12 NOTE — TELEPHONE ENCOUNTER
Pharmacy is requesting medication refill.  Please approve or deny this request.    Rx requested:  Requested Prescriptions     Pending Prescriptions Disp Refills    atorvastatin (LIPITOR) 10 MG tablet [Pharmacy Med Name: Atorvastatin Calcium 10 MG Oral Tablet] 90 tablet 0     Sig: Take 1 tablet by mouth daily         Last Office Visit:   6/13/2019      Next Visit Date:  Future Appointments   Date Time Provider Dawna Biggs   11/17/2020  9:15 AM Tamika Hammer MD [facility]

## 2020-05-12 ENCOUNTER — TELEPHONE (OUTPATIENT)
Dept: FAMILY MEDICINE CLINIC | Age: 65
End: 2020-05-12

## 2020-05-12 DIAGNOSIS — I10 ESSENTIAL HYPERTENSION: ICD-10-CM

## 2020-05-14 RX ORDER — AMLODIPINE BESYLATE 10 MG/1
TABLET ORAL
Qty: 30 TABLET | Refills: 0 | Status: SHIPPED | OUTPATIENT
Start: 2020-05-14 | End: 2020-06-10

## 2020-06-10 RX ORDER — AMLODIPINE BESYLATE 10 MG/1
TABLET ORAL
Qty: 30 TABLET | Refills: 0 | Status: SHIPPED | OUTPATIENT
Start: 2020-06-10 | End: 2020-07-14

## 2020-06-10 NOTE — TELEPHONE ENCOUNTER
[facility]  Please approve or deny this request.    Rx requested:  Requested Prescriptions     Pending Prescriptions Disp Refills    amLODIPine (NORVASC) 10 MG tablet [Pharmacy Med Name: amLODIPine Besylate 10 MG Oral Tablet] 30 tablet 0     Sig: Take 1 tablet by mouth once daily         Last Office Visit:   6/13/2019      Next Visit Date:  Future Appointments   Date Time Provider Dawna Biggs   6/12/2020 10:40 AM [address], MD [facility]   11/17/2020  9:15 AM Isiah Lombardi MD [facility]

## 2020-06-12 ENCOUNTER — VIRTUAL VISIT (OUTPATIENT)
Dept: FAMILY MEDICINE CLINIC | Age: 65
End: 2020-06-12
Payer: COMMERCIAL

## 2020-06-12 PROCEDURE — 99214 OFFICE O/P EST MOD 30 MIN: CPT | Performed by: FAMILY MEDICINE

## 2020-06-12 ASSESSMENT — ENCOUNTER SYMPTOMS
ABDOMINAL PAIN: 0
BLURRED VISION: 0
CHEST TIGHTNESS: 0
VOMITING: 0
VISUAL CHANGE: 0
DIARRHEA: 0
ORTHOPNEA: 0
SHORTNESS OF BREATH: 0
NAUSEA: 0
CONSTIPATION: 0
BLOOD IN STOOL: 0
COUGH: 0
APNEA: 0

## 2020-06-12 NOTE — PROGRESS NOTES
2020    TELEHEALTH EVALUATION -- Audio/Visual (During USTYK-79 public health emergency)    Due to Yvonne 19 outbreak, patient's office visit was converted to a virtual visit. Patient was contacted and agreed to proceed with a virtual visit via [employer]. me  The risks and benefits of converting to a virtual visit were discussed in light of the current infectious disease epidemic. Patient also understood that insurance coverage and co-pays are up to their individual insurance plans. HPI:    Nora El (:  1955) has requested an audio/video evaluation for the following concern(s):  Diabetes   She presents for her follow-up diabetic visit. She has type 2 diabetes mellitus. Her disease course has been stable. There are no hypoglycemic associated symptoms. Pertinent negatives for hypoglycemia include no headaches, seizures or sweats. Pertinent negatives for diabetes include no blurred vision, no chest pain, no fatigue, no foot paresthesias, no foot ulcerations, no polydipsia, no polyphagia, no polyuria, no visual change, no weakness and no weight loss. Symptoms are stable. Risk factors for coronary artery disease include sedentary lifestyle and post-menopausal. Current diabetic treatment includes oral agent (monotherapy). She is compliant with treatment all of the time. Her weight is stable. She is following a diabetic diet. Her overall blood glucose range is 130-140 mg/dl. An ACE inhibitor/angiotensin II receptor blocker is not being taken. She does not see a podiatrist.Eye exam is not current. Hypertension   This is a chronic problem. The problem is controlled (BP: 133/78). Pertinent negatives include no anxiety, blurred vision, chest pain, headaches, malaise/fatigue, neck pain, orthopnea, palpitations, peripheral edema, PND, shortness of breath or sweats. The current treatment provides significant improvement.        Review of Systems   Constitutional: Negative for activity change, appetite change, fatigue, malaise/fatigue and weight loss. Eyes: Negative for blurred vision. Respiratory: Negative for apnea, cough, chest tightness and shortness of breath. Cardiovascular: Negative for chest pain, palpitations, orthopnea, leg swelling and PND. Gastrointestinal: Negative for abdominal pain, blood in stool, constipation, diarrhea, nausea and vomiting. Endocrine: Negative for polydipsia, polyphagia and polyuria. Musculoskeletal: Positive for arthralgias (Bilateral foot pain in her toes). Negative for neck pain. Neurological: Negative for seizures, weakness and headaches. Psychiatric/Behavioral: Negative for hallucinations and suicidal ideas. Prior to Visit Medications    Medication Sig Taking? Authorizing Provider   amLODIPine (NORVASC) 10 MG tablet Take 1 tablet by mouth once daily  Stacey Bellamy MD   atorvastatin (LIPITOR) 10 MG tablet Take 1 tablet by mouth daily  Stacey Bellamy MD   tamsulosin (FLOMAX) 0.4 MG capsule Take 1 capsule by mouth daily  Angel Caruso MD   tamsulosin (FLOMAX) 0.4 MG capsule TAKE 1 CAPSULE BY MOUTH ONCE DAILY  Historical Provider, MD   VITAMIN D PO Take by mouth  Historical Provider, MD   Cyanocobalamin (VITAMIN B-12 PO) Take by mouth  Historical Provider, MD   APPLE CIDER VINEGAR PO Take by mouth  Historical Provider, MD   CINNAMON PO Take by mouth  Historical Provider, MD   CRANBERRY PO Take by mouth  Historical Provider, MD   nystatin (MYCOSTATIN) 438883 UNIT/GM cream Apply topically 2 times daily. Ya Navarro DO   glipiZIDE (GLUCOTROL XL) 2.5 MG extended release tablet Take 1 tablet by mouth daily  tSacey Bellamy MD   fluticasone (FLONASE) 50 MCG/ACT nasal spray 1 spray by Nasal route daily  Patient not taking: Reported on 11/18/2019  Stacey Bellamy MD   Alcohol Swabs PADS DX: diabetes mellitus. Test 1-3 time(s) daily - Ok to substitute per insurance (1 each = 1 box)  Stacey Bellamy MD   blood glucose monitor kit and supplies DX: diabetes mellitus.  Test 09/01/2020    Potassium monitoring  12/04/2020    Creatinine monitoring  12/04/2020    Breast cancer screen  03/19/2021    Cervical cancer screen  08/01/2022    Colon cancer screen colonoscopy  08/22/2022    Pneumococcal 0-64 years Vaccine  Completed    Hepatitis C screen  Completed    HIV screen  Completed    Hepatitis A vaccine  Aged Out    Hib vaccine  Aged Out    Meningococcal (ACWY) vaccine  Aged Out       PHYSICAL EXAMINATION:    [x] Alert  [x] Oriented to person/place/time    [x] No apparent distress  [x] Sclera clear     [x] Breathing appears normal     [x] Cranial Nerves II-XII grossly intact    [x] Motor grossly intact in visible upper extremities    [x] Motor grossly intact in visible lower extremities    [x] Normal Mood     [] OTHER:      Due to this being a TeleHealth encounter, evaluation of the following organ systems is limited: Vitals/Constitutional/EENT/Resp/CV/GI//MS/Neuro/Skin/Heme-Lymph-Imm. ASSESSMENT/PLAN:  1. Essential hypertension  Controlled  - Comprehensive Metabolic Panel; Future    2. Type 2 diabetes mellitus with stage 3 chronic kidney disease, without long-term current use of insulin (HCC)  We will check current hemoglobin A1c  - Hemoglobin A1C; Future  - Microalbumin / Creatinine Urine Ratio; Future  - Lipid Panel; Future  - Comprehensive Metabolic Panel; Future    3. Lipid screening  Patient not currently on statin but will check cholesterol levels and determine risk  - Lipid Panel; Future      Return in about 3 months (around 9/12/2020).     An  electronic signature was used to authenticate this note.    --Ela Zamudio MD on 6/12/2020 at 12:25 PM        Pursuant to the emergency declaration under the [facility], [facility] waiver authority and the [email or web address] and [employer] Act, this Virtual  Visit was conducted, with patient's consent, to reduce the patient's risk of exposure to COVID-19 and provide

## 2020-06-19 ENCOUNTER — HOSPITAL ENCOUNTER (OUTPATIENT)
Dept: LAB | Age: 65
Discharge: HOME | End: 2020-06-19
Payer: COMMERCIAL

## 2020-06-19 LAB
ALBUMIN SERPL-MCNC: 4.2 G/DL (ref 3.5–4.6)
ALP BLD-CCNC: 89 U/L (ref 40–130)
ALT SERPL-CCNC: 17 U/L (ref 0–33)
ANION GAP SERPL CALCULATED.3IONS-SCNC: 9 MEQ/L (ref 9–15)
AST SERPL-CCNC: 19 U/L (ref 0–35)
BILIRUB SERPL-MCNC: 0.6 MG/DL (ref 0.2–0.7)
BUN BLDV-MCNC: 15 MG/DL (ref 8–23)
CALCIUM SERPL-MCNC: 9.8 MG/DL (ref 8.5–9.9)
CHLORIDE BLD-SCNC: 104 MEQ/L (ref 95–107)
CHOLESTEROL, TOTAL: 162 MG/DL (ref 0–199)
CO2: 26 MEQ/L (ref 20–31)
CREAT SERPL-MCNC: 1.24 MG/DL (ref 0.5–0.9)
CREATININE URINE: 70.1 MG/DL
GFR AFRICAN AMERICAN: 52.6
GFR NON-AFRICAN AMERICAN: 43.4
GLOBULIN: 2.8 G/DL (ref 2.3–3.5)
GLUCOSE BLD-MCNC: 140 MG/DL (ref 70–99)
HBA1C MFR BLD: 6.3 % (ref 4.8–5.9)
HDLC SERPL-MCNC: 64 MG/DL (ref 40–59)
LDL CHOLESTEROL CALCULATED: 75 MG/DL (ref 0–129)
MICROALBUMIN UR-MCNC: 2.8 MG/DL
MICROALBUMIN/CREAT UR-RTO: 39.9 MG/G (ref 0–30)
POTASSIUM SERPL-SCNC: 4.3 MEQ/L (ref 3.4–4.9)
SODIUM BLD-SCNC: 139 MEQ/L (ref 135–144)
TOTAL PROTEIN: 7 G/DL (ref 6.3–8)
TRIGL SERPL-MCNC: 115 MG/DL (ref 0–150)

## 2020-06-19 PROCEDURE — 80053 COMPREHEN METABOLIC PANEL: CPT

## 2020-06-19 PROCEDURE — 83036 HEMOGLOBIN GLYCOSYLATED A1C: CPT

## 2020-06-19 PROCEDURE — 82570 ASSAY OF URINE CREATININE: CPT

## 2020-06-19 PROCEDURE — 36415 COLL VENOUS BLD VENIPUNCTURE: CPT

## 2020-06-19 PROCEDURE — 80061 LIPID PANEL: CPT

## 2020-06-19 PROCEDURE — 82043 UR ALBUMIN QUANTITATIVE: CPT

## 2020-06-29 RX ORDER — ATORVASTATIN CALCIUM 10 MG/1
TABLET, FILM COATED ORAL
Qty: 30 TABLET | Refills: 0 | Status: SHIPPED | OUTPATIENT
Start: 2020-06-29 | End: 2020-07-30

## 2020-07-14 RX ORDER — AMLODIPINE BESYLATE 10 MG/1
TABLET ORAL
Qty: 30 TABLET | Refills: 0 | Status: SHIPPED | OUTPATIENT
Start: 2020-07-14 | End: 2020-08-19

## 2020-07-14 NOTE — TELEPHONE ENCOUNTER
pharmacy requesting medication refill.  Please approve or deny this request.    Rx requested:  Requested Prescriptions     Pending Prescriptions Disp Refills    amLODIPine (NORVASC) 10 MG tablet [Pharmacy Med Name: amLODIPine Besylate 10 MG Oral Tablet] 30 tablet 0     Sig: Take 1 tablet by mouth once daily         Last Office Visit:   6/13/2019      Next Visit Date:  Future Appointments   Date Time Provider Dawna Biggs   11/17/2020  9:15 AM Niki Ceballos MD [facility]

## 2020-07-24 ENCOUNTER — HOSPITAL ENCOUNTER (OUTPATIENT)
Dept: WOMENS IMAGING | Age: 65
Discharge: HOME | End: 2020-07-26
Payer: COMMERCIAL

## 2020-07-24 PROCEDURE — 77067 SCR MAMMO BI INCL CAD: CPT

## 2020-07-30 RX ORDER — ATORVASTATIN CALCIUM 10 MG/1
10 TABLET, FILM COATED ORAL DAILY
Qty: 30 TABLET | Refills: 0 | Status: SHIPPED | OUTPATIENT
Start: 2020-07-30 | End: 2020-09-02

## 2020-08-05 ENCOUNTER — TELEPHONE (OUTPATIENT)
Dept: UROLOGY | Age: 65
End: 2020-08-05

## 2020-08-05 NOTE — TELEPHONE ENCOUNTER
----- Message from Rosey Pepper, [address] [place] sent at 8/4/2020  1:40 PM EDT -----  Regarding: cath supplies  [facility] states they need a letter stating that Pt cath supplies are a necessity ph:[phone] plan # [phone]   member# [id number] policy#

## 2020-08-05 NOTE — TELEPHONE ENCOUNTER
Patient  requesting medication refill. Please approve or deny this request.  Patient states her monitor is not working. Rx requested:  Requested Prescriptions     Pending Prescriptions Disp Refills    blood glucose monitor kit and supplies 1 kit 1     Sig: DX: diabetes mellitus.  Test 1-3 time(s) daily - Ok to substitute per insurance         Last Office Visit:   7/28/2020      Next Visit Date:  Future Appointments   Date Time Provider Dawna Biggs   11/17/2020  9:15 AM Bonita Hayes MD [facility]

## 2020-08-07 RX ORDER — LANCETS 30 GAUGE
EACH MISCELLANEOUS
Qty: 1 EACH | Refills: 5 | OUTPATIENT
Start: 2020-08-07

## 2020-08-07 RX ORDER — GLUCOSAMINE HCL/CHONDROITIN SU 500-400 MG
CAPSULE ORAL
Qty: 100 STRIP | Refills: 5 | OUTPATIENT
Start: 2020-08-07

## 2020-08-07 RX ORDER — LANCETS 30 GAUGE
EACH MISCELLANEOUS
Qty: 1 EACH | Refills: 5 | Status: SHIPPED | OUTPATIENT
Start: 2020-08-07 | End: 2020-08-11 | Stop reason: SDUPTHER

## 2020-08-07 RX ORDER — GLUCOSAMINE HCL/CHONDROITIN SU 500-400 MG
CAPSULE ORAL
Qty: 100 STRIP | Refills: 5 | Status: SHIPPED | OUTPATIENT
Start: 2020-08-07 | End: 2020-08-11 | Stop reason: SDUPTHER

## 2020-08-07 NOTE — TELEPHONE ENCOUNTER
Gage Santiago from [place] called said that prescriptions that were sent over have to say the maxim amount of times it needs to be taken daily for medicare b the prescriptions can not say 1-3 times on them it has to say 3 times daily. blood glucose monitor kit and supplies, Lancets MISC and blood glucose monitor strips and a new prescriptions have to be sent to them. cp      Rx requested:  Requested Prescriptions     Pending Prescriptions Disp Refills    blood glucose monitor strips 100 strip 5     Sig: DX: diabetes mellitus. Use 3 time(s) daily - Ok to substitute per insurance    Lancets MISC 1 each 5     Sig: DX: diabetes mellitus. Use 3 time(s) daily - Ok to substitute per insurance (1 each = 1 box)    blood glucose monitor kit and supplies 1 kit 1     Sig: DX: diabetes mellitus.  Test 3 time(s) daily - Ok to substitute per insurance         Last Office Visit:   6/12/2020      Next Visit Date:  Future Appointments   Date Time Provider Dawna Biggs   11/17/2020  9:15 AM Ramona Mak MD [facility]

## 2020-08-10 NOTE — TELEPHONE ENCOUNTER
Fabienne Husbands from [place] called said that prescriptions that were sent over have to say the maxim amount of times it needs to be taken daily for medicare b the prescriptions can not say 1-3 times on them it has to say 3 times daily. blood glucose monitor kit and supplies, Lancets MISC and blood glucose monitor strips and a new prescriptions have to be sent to them. Rx requested:  Requested Prescriptions     Pending Prescriptions Disp Refills    blood glucose monitor kit and supplies 1 kit 1     Sig: DX: diabetes mellitus. Test 1-3 time(s) daily - Ok to substitute per insurance    blood glucose monitor strips 100 strip 5     Sig: DX: diabetes mellitus. Use 1-3 time(s) daily - Ok to substitute per insurance    Lancets MISC 1 each 5     Sig: DX: diabetes mellitus.  Use 1-3 time(s) daily - Ok to substitute per insurance (1 each = 1 box)         Last Office Visit:   6/12/2020      Next Visit Date:  Future Appointments   Date Time Provider Dawna Biggs   11/17/2020  9:15 AM Jose Baldwin MD [facility]

## 2020-08-11 RX ORDER — LANCETS 30 GAUGE
EACH MISCELLANEOUS
Qty: 1 EACH | Refills: 5 | Status: SHIPPED | OUTPATIENT
Start: 2020-08-11 | End: 2021-02-15

## 2020-08-11 RX ORDER — GLUCOSAMINE HCL/CHONDROITIN SU 500-400 MG
CAPSULE ORAL
Qty: 100 STRIP | Refills: 5 | Status: SHIPPED | OUTPATIENT
Start: 2020-08-11 | End: 2021-02-15

## 2020-08-11 NOTE — TELEPHONE ENCOUNTER
Imani Naranjo is requesting  a new medication refill for the blood glucose monitor. The previous script stated 1-3 times daily and needs a specific # due to Medicare part B. I changed the sig to 3 times daily, if this is os please approve or make the correct changes to this prescription. They were not able to take a verbal request.     Rx requested:  Requested Prescriptions     Pending Prescriptions Disp Refills    blood glucose monitor kit and supplies 1 kit 1     Sig: DX: diabetes mellitus.  Test 3 time(s) daily - Ok to substitute per insurance         Last Office Visit:   6/12/2020      Next Visit Date:  Future Appointments   Date Time Provider Dawna Biggs   11/17/2020  9:15 AM Angeline Tadeo MD [facility]

## 2020-08-12 RX ORDER — GLIPIZIDE 2.5 MG/1
2.5 TABLET, EXTENDED RELEASE ORAL DAILY
Qty: 90 TABLET | Refills: 0 | Status: SHIPPED | OUTPATIENT
Start: 2020-08-12 | End: 2020-11-18 | Stop reason: SDUPTHER

## 2020-08-12 NOTE — TELEPHONE ENCOUNTER
Pharmacy is requesting medication refill.  Please approve or deny this request.    Rx requested:  Requested Prescriptions     Pending Prescriptions Disp Refills    glipiZIDE (GLUCOTROL XL) 2.5 MG extended release tablet [Pharmacy Med Name: glipiZIDE ER 2.5 MG Oral Tablet Extended Release 24 Hour] 90 tablet 0     Sig: Take 1 tablet by mouth daily         Last Office Visit:   6/12/2020      Next Visit Date:  Future Appointments   Date Time Provider Dawna Biggs   11/17/2020  9:15 AM MD Coral Gaytan

## 2020-08-19 RX ORDER — AMLODIPINE BESYLATE 10 MG/1
TABLET ORAL
Qty: 30 TABLET | Refills: 0 | Status: SHIPPED | OUTPATIENT
Start: 2020-08-19 | End: 2020-09-17 | Stop reason: SDUPTHER

## 2020-08-19 NOTE — TELEPHONE ENCOUNTER
Pharmacy requesting medication refill.  Please approve or deny this request.    Rx requested:  Requested Prescriptions     Pending Prescriptions Disp Refills    amLODIPine (NORVASC) 10 MG tablet [Pharmacy Med Name: amLODIPine Besylate 10 MG Oral Tablet] 30 tablet 0     Sig: Take 1 tablet by mouth once daily         Last Office Visit:   6/12/2020      Next Visit Date:  Future Appointments   Date Time Provider Dawna Biggs   11/17/2020  9:15 AM Desi Sandoval MD [facility]

## 2020-09-02 RX ORDER — ATORVASTATIN CALCIUM 10 MG/1
10 TABLET, FILM COATED ORAL DAILY
Qty: 30 TABLET | Refills: 0 | Status: SHIPPED | OUTPATIENT
Start: 2020-09-02 | End: 2020-09-30

## 2020-09-02 NOTE — TELEPHONE ENCOUNTER
Pharmacy is requesting medication refill.  Please approve or deny this request.    Rx requested:  Requested Prescriptions     Pending Prescriptions Disp Refills    atorvastatin (LIPITOR) 10 MG tablet [Pharmacy Med Name: Atorvastatin Calcium 10 MG Oral Tablet] 30 tablet 0     Sig: Take 1 tablet by mouth daily         Last Office Visit:   Visit date not found      Next Visit Date:  Future Appointments   Date Time Provider Dawna Biggs   11/17/2020  9:15 AM Olivia Kerr MD [facility]

## 2020-09-17 ENCOUNTER — HOSPITAL ENCOUNTER (OUTPATIENT)
Age: 65
Setting detail: SPECIMEN
Discharge: HOME | End: 2020-09-17
Payer: MEDICARE

## 2020-09-17 ENCOUNTER — OFFICE VISIT (OUTPATIENT)
Dept: OBGYN CLINIC | Age: 65
End: 2020-09-17
Payer: MEDICARE

## 2020-09-17 VITALS
HEIGHT: 67 IN | BODY MASS INDEX: 31.08 KG/M2 | SYSTOLIC BLOOD PRESSURE: 122 MMHG | DIASTOLIC BLOOD PRESSURE: 86 MMHG | WEIGHT: 198 LBS

## 2020-09-17 DIAGNOSIS — Z11.51 SCREENING FOR HPV (HUMAN PAPILLOMAVIRUS): ICD-10-CM

## 2020-09-17 DIAGNOSIS — Z01.419 WOMEN'S ANNUAL ROUTINE GYNECOLOGICAL EXAMINATION: ICD-10-CM

## 2020-09-17 PROCEDURE — 87624 HPV HI-RISK TYP POOLED RSLT: CPT

## 2020-09-17 PROCEDURE — 88175 CYTOPATH C/V AUTO FLUID REDO: CPT

## 2020-09-17 PROCEDURE — 99397 PER PM REEVAL EST PAT 65+ YR: CPT | Performed by: OBSTETRICS & GYNECOLOGY

## 2020-09-17 RX ORDER — AMLODIPINE BESYLATE 10 MG/1
TABLET ORAL
Qty: 30 TABLET | Refills: 0 | Status: SHIPPED | OUTPATIENT
Start: 2020-09-17 | End: 2020-10-17

## 2020-09-17 ASSESSMENT — PATIENT HEALTH QUESTIONNAIRE - PHQ9
2. FEELING DOWN, DEPRESSED OR HOPELESS: 0
SUM OF ALL RESPONSES TO PHQ QUESTIONS 1-9: 0
SUM OF ALL RESPONSES TO PHQ9 QUESTIONS 1 & 2: 0
SUM OF ALL RESPONSES TO PHQ QUESTIONS 1-9: 0
2. FEELING DOWN, DEPRESSED OR HOPELESS: 0
SUM OF ALL RESPONSES TO PHQ QUESTIONS 1-9: 0
1. LITTLE INTEREST OR PLEASURE IN DOING THINGS: 0
SUM OF ALL RESPONSES TO PHQ QUESTIONS 1-9: 0
SUM OF ALL RESPONSES TO PHQ9 QUESTIONS 1 & 2: 0
1. LITTLE INTEREST OR PLEASURE IN DOING THINGS: 0

## 2020-09-17 ASSESSMENT — ENCOUNTER SYMPTOMS
CONSTIPATION: 0
VOMITING: 0
COUGH: 0
SHORTNESS OF BREATH: 0
WHEEZING: 0
DIARRHEA: 0
BLOOD IN STOOL: 0
ABDOMINAL PAIN: 0
ABDOMINAL DISTENTION: 0
SORE THROAT: 0
NAUSEA: 0

## 2020-09-17 NOTE — PROGRESS NOTES
Postmenopausal Annual     Asya Torres is a 72y.o. year old who has a history of endometrial carcinoma grade 1 stage Ia presents today for her annual well woman exam.  Patient's last Pap smear was in 2019 which was normal. The patient is sexually active. The patient has never been taking hormone replacement therapy. Patient denies post-menopausal vaginal bleeding.,  Or postcoital discomfort    the patient has regular exercise: yes    Vitals:  /86   Ht 5' 7\" (1.702 m)   Wt 198 lb (89.8 kg)   LMP  (LMP Unknown)   BMI 31.01 kg/m²   Allergies:  Ciprofloxacin  Past Medical History:   Diagnosis Date    Allergic rhinitis     Asthma     Chest pain, unspecified 8/20/2018    Endometrial cancer, grade I ([facility] [address] 2011    [facility]    Hypertension     Kidney problem     blockage in urethra and urine backs up has to get urethra dilated, has scar tissue    Type 2 diabetes mellitus with chronic kidney disease, without long-term current use of insulin ([facility] [address] 6/21/2019     Past Surgical History:   Procedure Laterality Date    CHOLECYSTECTOMY  1990    HYSTERECTOMY  2011    endometrial cancer stage !a    URETHRAL STRICTURE DILATATION  2016    Dr. Elsy Dickey      No family history on file.   Social History     Socioeconomic History    Marital status:      Spouse name: Not on file    Number of children: Not on file    Years of education: Not on file    Highest education level: Not on file   Occupational History    Not on file   Social Needs    Financial resource strain: Not on file    Food insecurity     Worry: Not on file     Inability: Not on file    Transportation needs     Medical: Not on file     Non-medical: Not on file   Tobacco Use    Smoking status: Never Smoker    Smokeless tobacco: Never Used   Substance and Sexual Activity    Alcohol use: No    Drug use: No    Sexual activity: Not on file   Lifestyle    Physical activity     Days per week: Not on file     Minutes per session: Not on file    Stress: Not on file   Relationships    Social connections     Talks on phone: Not on file     Gets together: Not on file     Attends Taoism service: Not on file     Active member of club or organization: Not on file     Attends meetings of clubs or organizations: Not on file     Relationship status: Not on file    Intimate partner violence     Fear of current or ex partner: Not on file     Emotionally abused: Not on file     Physically abused: Not on file     Forced sexual activity: Not on file   Other Topics Concern    Not on file   Social History Narrative    Not on file       Last mammogram 2020  Breast cancer risk factors : Previous endometrial cancer  Last Pap 2019 nl    No LMP recorded (lmp unknown). Patient has had a hysterectomy. Age at menopause onset: 48  Menopausal symptom assessment: Normal negative  Urinary incontinence & GUsymptoms: Negative  Sexual dysfunction: Negative  Present Hormonal medications: Negative    Osteoporosis risk assessment : Menopause status  Last bone mineral density : -3 years ago    History of abnormal lipids:yes -   Hypertension no  Stroke/MI no    Yearly flu vaccine recommended for persons aged 48 and older. Colonoscopyup-to-date    Review of Systems  Review of Systems   Constitutional: Negative for activity change, appetite change, fatigue and unexpected weight change. HENT: Negative for nosebleeds and sore throat. Eyes: Negative for visual disturbance. Respiratory: Negative for cough, shortness of breath and wheezing. Cardiovascular: Negative for chest pain, palpitations and leg swelling. Gastrointestinal: Negative for abdominal distention, abdominal pain, blood in stool, constipation, diarrhea, nausea and vomiting. Endocrine: Negative for cold intolerance, heat intolerance, polydipsia and polyuria.    Genitourinary: Negative for difficulty urinating (Patient has some stenosis and straight caths normally), dyspareunia, dysuria, frequency, genital sores, hematuria, pelvic pain, urgency, vaginal bleeding, vaginal discharge and vaginal pain. Musculoskeletal: Negative for arthralgias. Skin: Negative for rash. Neurological: Negative for dizziness, weakness, light-headedness and headaches. Hematological: Negative for adenopathy. Does not bruise/bleed easily. Psychiatric/Behavioral: Negative for agitation, confusion, dysphoric mood and sleep disturbance. All other systems reviewed and are negative. Objective:     Vitals:  /86   Ht 5' 7\" (1.702 m)   Wt 198 lb (89.8 kg)   LMP  (LMP Unknown)   BMI 31.01 kg/m²     Physical Exam  Physical Exam  Constitutional:       General: She is not in acute distress. Appearance: She is well-developed. She is not diaphoretic. HENT:      Head: Normocephalic. Eyes:      Conjunctiva/sclera: Conjunctivae normal.      Pupils: Pupils are equal, round, and reactive to light. Neck:      Thyroid: No thyromegaly. Trachea: No tracheal deviation. Cardiovascular:      Rate and Rhythm: Normal rate and regular rhythm. Heart sounds: Normal heart sounds. No murmur. No friction rub. No gallop. Pulmonary:      Effort: Pulmonary effort is normal. No respiratory distress. Breath sounds: Normal breath sounds. No wheezing or rales. Chest:      Chest wall: No tenderness. Breasts:         Right: No inverted nipple, mass, nipple discharge, skin change or tenderness. Left: No inverted nipple, mass, nipple discharge, skin change or tenderness. Abdominal:      General: Bowel sounds are normal. There is no distension. Palpations: Abdomen is soft. There is no mass. Tenderness: There is no abdominal tenderness. There is no guarding or rebound. Genitourinary:     Labia:         Right: No rash, tenderness or lesion. Left: No rash, tenderness or lesion.        Vagina: Bleeding (There is some fusion of the vaginal cuff with the posterior vaginal tissue that is friable and thickened. Sampling was taken however there was heavy bleeding unsure of a good sampling Monsel's was placed.) present. No vaginal discharge, erythema or tenderness. Adnexa:         Right: No mass, tenderness or fullness. Left: No mass, tenderness or fullness. Comments: Cuff is not prolapsed and there is not a cystocele or rectocele noted  Musculoskeletal:      Right lower leg: No edema. Left lower leg: No edema. Lymphadenopathy:      Upper Body:      Right upper body: No supraclavicular or axillary adenopathy. Left upper body: No supraclavicular or axillary adenopathy. Skin:     General: Skin is warm and dry. Neurological:      Mental Status: She is alert and oriented to person, place, and time. Cranial Nerves: No cranial nerve deficit. Deep Tendon Reflexes: Reflexes normal.   Psychiatric:         Mood and Affect: Mood normal.         Behavior: Behavior normal.         Judgment: Judgment normal.         Assessment:      Diagnosis Orders   1. Women's annual routine gynecological examination  PAP SMEAR   2. Screening for HPV (human papillomavirus)  PAP SMEAR       Body mass index is 31.01 kg/m². Obesity:  Normal weight  Smoking:  No    Plan:   Pap : indicated:  performed. Obesity Counseling:  N/A  Smoking Counseling:  N/A    Orders Placed This Encounter   Procedures    PAP SMEAR     Standing Status:   Future     Standing Expiration Date:   9/17/2021     Order Specific Question:   Collection Type     Answer: Thin Prep     Order Specific Question:   Prior Abnormal Pap Test     Answer:   No     Order Specific Question:   Screening or Diagnostic     Answer:   Screening     Order Specific Question:   HPV Requested? Answer:   Yes     Comments:   16/18     Order Specific Question:   High Risk Patient     Answer:   N/A     No orders of the defined types were placed in this encounter.   Review of previous pelvic examinations revealed no concerns with the vaginal cuff however today's exam there is some thickening and fusion at the cuff with obliteration of can of the fornix region of the posterior aspect of the vagina of this area is very friable was nonpainful for patient but in light of this will await Pap smear results and consider pelvic ultrasound. Patient has a previous history significant for endometrial cancer    Follow up:  Return in about 1 year (around 9/17/2021) for annual examination. Brii Renner DO    HEALTH MAINTENANCE:   Health information given. Women's Health patient information and counselling done. Counseled regarding risk/benefits/alternatives to Hormone Therapyand need for yearly re-evaluation. Periodic Pap smear discussed. Mammogram recommended yearly. Colon cancer screening by age 48 & every 5-10 years. Bone mineral density (by age 72 or sooner if indication itwould affect Hormone Therapy management or other risk factors for osteoporosis).

## 2020-09-18 RX ORDER — NYSTATIN 100000 U/G
CREAM TOPICAL
Qty: 1 TUBE | Refills: 1 | Status: SHIPPED | OUTPATIENT
Start: 2020-09-18 | End: 2022-08-17 | Stop reason: SDUPTHER

## 2020-09-22 LAB
HPV COMMENT: NORMAL
HPV TYPE 16: NOT DETECTED
HPV TYPE 18: NOT DETECTED
HPVOH (OTHER TYPES): NOT DETECTED

## 2020-09-30 NOTE — TELEPHONE ENCOUNTER
Pharmacy is requesting medication refill.  Please approve or deny this request.    Rx requested:  Requested Prescriptions     Pending Prescriptions Disp Refills    atorvastatin (LIPITOR) 10 MG tablet [Pharmacy Med Name: Atorvastatin Calcium 10 MG Oral Tablet] 30 tablet 0     Sig: Take 1 tablet by mouth daily         Last Office Visit:   6/12/2020      Next Visit Date:  Future Appointments   Date Time Provider Dawna Biggs   11/17/2020  9:15 AM Anju Anne MD [facility]

## 2020-10-02 RX ORDER — ATORVASTATIN CALCIUM 10 MG/1
10 TABLET, FILM COATED ORAL DAILY
Qty: 30 TABLET | Refills: 0 | Status: SHIPPED | OUTPATIENT
Start: 2020-10-02 | End: 2020-10-09 | Stop reason: SDUPTHER

## 2020-10-09 RX ORDER — ATORVASTATIN CALCIUM 10 MG/1
10 TABLET, FILM COATED ORAL DAILY
Qty: 90 TABLET | Refills: 0 | Status: SHIPPED | OUTPATIENT
Start: 2020-10-09 | End: 2021-02-02

## 2020-10-09 NOTE — TELEPHONE ENCOUNTER
[facility] is requesting 90 daymedication refill.  Please approve or deny this request.    Rx requested:  Requested Prescriptions     Pending Prescriptions Disp Refills    atorvastatin (LIPITOR) 10 MG tablet 90 tablet 0     Sig: Take 1 tablet by mouth daily         Last Office Visit:   6/12/2020      Next Visit Date:  Future Appointments   Date Time Provider Dawna Biggs   11/17/2020  9:15 AM Victor Hugo Maldonado MD [facility]

## 2020-10-17 RX ORDER — AMLODIPINE BESYLATE 10 MG/1
TABLET ORAL
Qty: 30 TABLET | Refills: 0 | Status: SHIPPED | OUTPATIENT
Start: 2020-10-17 | End: 2020-11-16 | Stop reason: SDUPTHER

## 2020-10-17 NOTE — TELEPHONE ENCOUNTER
Pharmacy is requesting medication refill.  Please approve or deny this request.    Rx requested:  Requested Prescriptions     Pending Prescriptions Disp Refills    amLODIPine (NORVASC) 10 MG tablet [Pharmacy Med Name: amLODIPine Besylate 10 MG Oral Tablet] 30 tablet 0     Sig: Take 1 tablet by mouth once daily         Last Office Visit:   6/12/2020      Next Visit Date:  Future Appointments   Date Time Provider Dawna Biggs   11/17/2020  9:15 AM Orson Collet, MD [facility]

## 2020-11-16 RX ORDER — AMLODIPINE BESYLATE 10 MG/1
10 TABLET ORAL DAILY
Qty: 90 TABLET | Refills: 0 | Status: SHIPPED | OUTPATIENT
Start: 2020-11-16 | End: 2021-02-16

## 2020-11-16 NOTE — TELEPHONE ENCOUNTER
Pharmacy  Requesting 90 day supply of  medication via fax.  Please approve or deny this request.    Rx requested:  Requested Prescriptions     Pending Prescriptions Disp Refills    amLODIPine (NORVASC) 10 MG tablet 30 tablet 0         Last Office Visit:   6/12/2020      Next Visit Date:  Future Appointments   Date Time Provider Dawna Biggs   11/17/2020  9:15 AM Queta Tobias MD [facility]

## 2020-11-17 ENCOUNTER — OFFICE VISIT (OUTPATIENT)
Dept: UROLOGY | Age: 65
End: 2020-11-17
Payer: MEDICARE

## 2020-11-17 VITALS
HEIGHT: 67 IN | DIASTOLIC BLOOD PRESSURE: 70 MMHG | HEART RATE: 66 BPM | SYSTOLIC BLOOD PRESSURE: 138 MMHG | BODY MASS INDEX: 29.51 KG/M2 | WEIGHT: 188 LBS

## 2020-11-17 PROCEDURE — 99213 OFFICE O/P EST LOW 20 MIN: CPT | Performed by: UROLOGY

## 2020-11-17 RX ORDER — PREDNISOLONE ACETATE 10 MG/ML
SUSPENSION/ DROPS OPHTHALMIC
COMMUNITY
Start: 2020-10-22 | End: 2021-03-18

## 2020-11-17 RX ORDER — TAMSULOSIN HYDROCHLORIDE 0.4 MG/1
0.4 CAPSULE ORAL DAILY
Qty: 90 CAPSULE | Refills: 3 | Status: SHIPPED | OUTPATIENT
Start: 2020-11-17

## 2020-11-17 RX ORDER — POLYMYXIN B SULFATE AND TRIMETHOPRIM 1; 10000 MG/ML; [USP'U]/ML
SOLUTION OPHTHALMIC
COMMUNITY
Start: 2020-10-21 | End: 2021-03-18

## 2020-11-17 NOTE — PROGRESS NOTES
[facility] EVALUATION NOTE                                                 H&P                                                                                                                                                 Reason for Visit  Neuropathic bladder, bladder outlet obstruction    History of Present Illness  '68-year-old female who has been managing her lower urinary tract with intermittent self-catheterization 6 times a day  Patient also voids in between with the help of tamsulosin  Denies UTIs  Has adequate amount of catheters  Denies gross hematuria  No issues with catheterization      Urologic Review of Systems/Symptoms  Stable unchanged    Review of Systems  Hospitalization: None recent  All 14 categories of Review of Systems otherwise reviewed no other findings reported.     Past Medical History:   Diagnosis Date    Allergic rhinitis     Asthma     Chest pain, unspecified 8/20/2018    Endometrial cancer, grade I ([facility] [address] 2011    [facility]    Hypertension     Kidney problem     blockage in urethra and urine backs up has to get urethra dilated, has scar tissue    Type 2 diabetes mellitus with chronic kidney disease, without long-term current use of insulin ([facility] [address] 6/21/2019     Past Surgical History:   Procedure Laterality Date    CATARACT REMOVAL Left 11/2020    CHOLECYSTECTOMY  1990    HYSTERECTOMY  2011    endometrial cancer stage !a    URETHRAL STRICTURE DILATATION  2016    Dr. Antonino Schmitz History     Socioeconomic History    Marital status:      Spouse name: None    Number of children: None    Years of education: None    Highest education level: None   Occupational History    None   Social Needs    Financial resource strain: None    Food insecurity     Worry: None     Inability: None    Transportation needs     Medical: None     Non-medical: None   Tobacco Use    Smoking status: Never Smoker    Smokeless tobacco: Never Used   Substance and Sexual Activity    Alcohol use: No    Drug use: No    Sexual activity: None   Lifestyle    Physical activity     Days per week: None     Minutes per session: None    Stress: None   Relationships    Social connections     Talks on phone: None     Gets together: None     Attends Tenriism service: None     Active member of club or organization: None     Attends meetings of clubs or organizations: None     Relationship status: None    Intimate partner violence     Fear of current or ex partner: None     Emotionally abused: None     Physically abused: None     Forced sexual activity: None   Other Topics Concern    None   Social History Narrative    None     History reviewed. No pertinent family history. Current Outpatient Medications   Medication Sig Dispense Refill    tamsulosin (FLOMAX) 0.4 MG capsule Take 1 capsule by mouth daily 90 capsule 3    trimethoprim-polymyxin b (POLYTRIM) 36171-9.1 UNIT/ML-% ophthalmic solution INSTILL 1 DROP 4 TIMES DAILY      prednisoLONE acetate (PRED FORTE) 1 % ophthalmic suspension INSTILL 1 DROP INTO THE AFFECTED EYE(S) 4 TIMES DAILY      amLODIPine (NORVASC) 10 MG tablet Take 1 tablet by mouth daily 90 tablet 0    atorvastatin (LIPITOR) 10 MG tablet Take 1 tablet by mouth daily 90 tablet 0    nystatin (MYCOSTATIN) 092857 UNIT/GM cream Apply topically 2 times daily. 1 Tube 1    blood glucose monitor kit and supplies DX: diabetes mellitus. Test 3 time(s) daily - Ok to substitute per insurance 1 kit 1    glipiZIDE (GLUCOTROL XL) 2.5 MG extended release tablet Take 1 tablet by mouth daily 90 tablet 0    blood glucose monitor strips DX: diabetes mellitus. Use 3 time(s) daily - Ok to substitute per insurance 100 strip 5    Lancets MISC DX: diabetes mellitus.  Use 3 time(s) daily - Ok to substitute per insurance (1 each = 1 box) 1 each 5    tamsulosin (FLOMAX) 0.4 MG capsule Take 1 capsule by mouth daily 90 capsule 3    VITAMIN D PO Take by mouth      Cyanocobalamin (VITAMIN B-12 PO) Take by mouth      APPLE CIDER VINEGAR PO Take by mouth      CRANBERRY PO Take by mouth      Alcohol Swabs PADS DX: diabetes mellitus. Test 1-3 time(s) daily - Ok to substitute per insurance (1 each = 1 box) 1 each 5    aspirin 81 MG tablet Take 81 mg by mouth daily       No current facility-administered medications for this visit. Ciprofloxacin  All reviewed and verified by Dr Paula Mcdaniels on today's visit    No results found for: PSA, PSADIA  No results found for this visit on 11/17/20. Physical Exam  Vitals:    11/17/20 0915   BP: 138/70   Pulse: 66   Weight: 188 lb (85.3 kg)   Height: 5' 7\" (1.702 m)     Constitutional: Not in distress. Head and Neck: No lymphadenopathy  Cardiovascular: Normal rate, BP reviewed. Normal  Pulmonary/Chest: Normal respiratory effort no shortness of breath  Abdominal: Not distended. Denies abdominal pain  Urologic Exam  Not indicated. Psychiatric: Alert and oriented x3. Assessment/Medical Necessity-Decision Making  Neuropathic bladder with urethral strictures on intermittent self cath 6 times a day with spontaneous voids in between helped by tamsulosin  Plan  Follow-up yearly  Prescription refilled  Greater than 50% of 15 minutes spent consulting patient face-to-face  No orders of the defined types were placed in this encounter. Orders Placed This Encounter   Medications    tamsulosin (FLOMAX) 0.4 MG capsule     Sig: Take 1 capsule by mouth daily     Dispense:  90 capsule     Refill:  3     Tammi Nails MD       Please note this report has been partially produced using speech recognition software  And may cause contain errors related to that system including grammar, punctuation and spelling as well as words and phrases that may seem inappropriate. If there are questions or concerns please feel free to contact me to clarify.

## 2020-11-18 RX ORDER — GLIPIZIDE 2.5 MG/1
2.5 TABLET, EXTENDED RELEASE ORAL DAILY
Qty: 90 TABLET | Refills: 0 | Status: SHIPPED | OUTPATIENT
Start: 2020-11-18 | End: 2021-02-16

## 2020-11-18 NOTE — TELEPHONE ENCOUNTER
Patient is requesting medication refill. Please approve or deny this request.  Patient stated she is out of her medication, she took her last one today and did not want to call the pharmacy because she stated they could take possibly up to 24-48 hours to refill. I advised the patient that we also have that same rule. I asked her for future reference to call a week prior to running to avoid not having any medication left to take.      Rx requested:  Requested Prescriptions     Pending Prescriptions Disp Refills    glipiZIDE (GLUCOTROL XL) 2.5 MG extended release tablet 90 tablet 0     Sig: Take 1 tablet by mouth daily         Last Office Visit:   6/12/2020      Next Visit Date:  Future Appointments   Date Time Provider Dawna Biggs   11/16/2021  9:15 AM [facility]

## 2020-12-10 ENCOUNTER — HOSPITAL ENCOUNTER (OUTPATIENT)
Dept: LAB | Age: 65
Discharge: HOME | End: 2020-12-10
Payer: MEDICARE

## 2020-12-10 LAB
ALBUMIN SERPL-MCNC: 4 G/DL (ref 3.5–4.6)
ANION GAP SERPL CALCULATED.3IONS-SCNC: 15 MEQ/L (ref 9–15)
BACTERIA: ABNORMAL /HPF
BILIRUBIN URINE: NEGATIVE
BLOOD, URINE: ABNORMAL
BUN BLDV-MCNC: 17 MG/DL (ref 8–23)
CALCIUM SERPL-MCNC: 9.4 MG/DL (ref 8.5–9.9)
CHLORIDE BLD-SCNC: 102 MEQ/L (ref 95–107)
CLARITY: ABNORMAL
CO2: 24 MEQ/L (ref 20–31)
COLOR: YELLOW
CREAT SERPL-MCNC: 1.17 MG/DL (ref 0.5–0.9)
CREATININE URINE: 188.6 MG/DL
EPITHELIAL CELLS, UA: ABNORMAL /HPF
GFR AFRICAN AMERICAN: 56.1
GFR NON-AFRICAN AMERICAN: 46.4
GLUCOSE BLD-MCNC: 202 MG/DL (ref 70–99)
GLUCOSE URINE: NEGATIVE MG/DL
HCT VFR BLD CALC: 38.5 % (ref 37–47)
HEMOGLOBIN: 13.1 G/DL (ref 12–16)
KETONES, URINE: NEGATIVE MG/DL
LEUKOCYTE ESTERASE, URINE: ABNORMAL
MCH RBC QN AUTO: 33 PG (ref 27–31.3)
MCHC RBC AUTO-ENTMCNC: 33.9 % (ref 33–37)
MCV RBC AUTO: 97.5 FL (ref 82–100)
NITRITE, URINE: POSITIVE
PARATHYROID HORMONE INTACT: 82.8 PG/ML (ref 15–65)
PDW BLD-RTO: 12.6 % (ref 11.5–14.5)
PH UA: 6 (ref 5–9)
PHOSPHORUS: 3.4 MG/DL (ref 2.3–4.8)
PLATELET # BLD: 288 K/UL (ref 130–400)
POTASSIUM SERPL-SCNC: 3.4 MEQ/L (ref 3.4–4.9)
PROTEIN PROTEIN: 65 MG/DL
PROTEIN UA: 30 MG/DL
PROTEIN/CREAT RATIO: 0.3 ML/ML
PROTEIN/CREAT RATIO: 0.3 ML/ML (ref 0–0.2)
RBC # BLD: 3.95 M/UL (ref 4.2–5.4)
RBC UA: ABNORMAL /HPF (ref 0–2)
SODIUM BLD-SCNC: 141 MEQ/L (ref 135–144)
SPECIFIC GRAVITY UA: 1.02 (ref 1–1.03)
UROBILINOGEN, URINE: 0.2 E.U./DL
VITAMIN D 25-HYDROXY: 48.2 NG/ML (ref 30–100)
WBC # BLD: 7.3 K/UL (ref 4.8–10.8)
WBC UA: >100 /HPF (ref 0–5)

## 2020-12-10 PROCEDURE — 83970 ASSAY OF PARATHORMONE: CPT

## 2020-12-10 PROCEDURE — 36415 COLL VENOUS BLD VENIPUNCTURE: CPT

## 2020-12-10 PROCEDURE — 80069 RENAL FUNCTION PANEL: CPT

## 2020-12-10 PROCEDURE — 82306 VITAMIN D 25 HYDROXY: CPT

## 2020-12-10 PROCEDURE — 81001 URINALYSIS AUTO W/SCOPE: CPT

## 2020-12-10 PROCEDURE — 84156 ASSAY OF PROTEIN URINE: CPT

## 2020-12-10 PROCEDURE — 85027 COMPLETE CBC AUTOMATED: CPT

## 2021-01-25 ENCOUNTER — HOSPITAL ENCOUNTER (OUTPATIENT)
Dept: LAB | Age: 66
Discharge: HOME | End: 2021-01-25
Payer: MEDICARE

## 2021-01-25 LAB
ALBUMIN SERPL-MCNC: 4 G/DL (ref 3.5–4.6)
ANION GAP SERPL CALCULATED.3IONS-SCNC: 12 MEQ/L (ref 9–15)
BUN BLDV-MCNC: 18 MG/DL (ref 8–23)
CALCIUM SERPL-MCNC: 9 MG/DL (ref 8.5–9.9)
CHLORIDE BLD-SCNC: 103 MEQ/L (ref 95–107)
CO2: 24 MEQ/L (ref 20–31)
CREAT SERPL-MCNC: 1.45 MG/DL (ref 0.5–0.9)
GFR AFRICAN AMERICAN: 43.8
GFR NON-AFRICAN AMERICAN: 36.2
GLUCOSE BLD-MCNC: 131 MG/DL (ref 70–99)
PHOSPHORUS: 3.8 MG/DL (ref 2.3–4.8)
POTASSIUM SERPL-SCNC: 4.1 MEQ/L (ref 3.4–4.9)
SODIUM BLD-SCNC: 139 MEQ/L (ref 135–144)

## 2021-01-25 PROCEDURE — 80069 RENAL FUNCTION PANEL: CPT

## 2021-01-25 PROCEDURE — 36415 COLL VENOUS BLD VENIPUNCTURE: CPT

## 2021-02-02 DIAGNOSIS — E11.22 TYPE 2 DIABETES MELLITUS WITH STAGE 3 CHRONIC KIDNEY DISEASE, WITHOUT LONG-TERM CURRENT USE OF INSULIN (HCC): Chronic | ICD-10-CM

## 2021-02-02 DIAGNOSIS — N18.30 TYPE 2 DIABETES MELLITUS WITH STAGE 3 CHRONIC KIDNEY DISEASE, WITHOUT LONG-TERM CURRENT USE OF INSULIN (HCC): Chronic | ICD-10-CM

## 2021-02-02 RX ORDER — ATORVASTATIN CALCIUM 10 MG/1
10 TABLET, FILM COATED ORAL DAILY
Qty: 90 TABLET | Refills: 0 | Status: SHIPPED | OUTPATIENT
Start: 2021-02-02 | End: 2021-04-30

## 2021-02-02 NOTE — TELEPHONE ENCOUNTER
Pharmacy is requesting medication refill.  Please approve or deny this request.    Rx requested:  Requested Prescriptions     Pending Prescriptions Disp Refills    atorvastatin (LIPITOR) 10 MG tablet [Pharmacy Med Name: Atorvastatin Calcium 10 MG Oral Tablet] 90 tablet 0     Sig: Take 1 tablet by mouth daily         Last Office Visit:   6/12/2020      Next Visit Date:  Future Appointments   Date Time Provider Dawna Biggs   11/16/2021  9:15 AM Vishal Lauren MD [facility]

## 2021-02-14 DIAGNOSIS — E11.9 TYPE 2 DIABETES MELLITUS WITHOUT COMPLICATION, WITHOUT LONG-TERM CURRENT USE OF INSULIN (HCC): ICD-10-CM

## 2021-02-14 NOTE — TELEPHONE ENCOUNTER
Pharmacy  requesting medication refill.  Please approve or deny this request.    Rx requested:  Requested Prescriptions     Pending Prescriptions Disp Refills    Lancets ([address]) [address] [facility] [Pharmacy Med Name: Aime MIX [address] MIS] 856 each 0     Sig: USE    [address]    blood glucose test strips (ONETOUCH ULTRA) strip [Pharmacy Med Name: OneTouch Ultra Blue In Vitro Strip] 100 each 0     Sig: USE  STRIP  [address]         Last Office Visit:   6/12/2020      Next Visit Date:  Future Appointments   Date Time Provider Dawna Biggs   11/16/2021  9:15 AM Talia Parker MD [facility]

## 2021-02-15 RX ORDER — BLOOD SUGAR DIAGNOSTIC
STRIP MISCELLANEOUS
Qty: 100 EACH | Refills: 0 | Status: SHIPPED | OUTPATIENT
Start: 2021-02-15 | End: 2021-05-04 | Stop reason: SDUPTHER

## 2021-02-15 RX ORDER — LANCETS 33 GAUGE
EACH MISCELLANEOUS
Qty: 100 EACH | Refills: 0 | Status: SHIPPED | OUTPATIENT
Start: 2021-02-15 | End: 2021-05-04 | Stop reason: SDUPTHER

## 2021-02-16 DIAGNOSIS — N18.30 TYPE 2 DIABETES MELLITUS WITH STAGE 3 CHRONIC KIDNEY DISEASE, WITHOUT LONG-TERM CURRENT USE OF INSULIN (HCC): Chronic | ICD-10-CM

## 2021-02-16 DIAGNOSIS — I10 ESSENTIAL HYPERTENSION: ICD-10-CM

## 2021-02-16 DIAGNOSIS — E11.22 TYPE 2 DIABETES MELLITUS WITH STAGE 3 CHRONIC KIDNEY DISEASE, WITHOUT LONG-TERM CURRENT USE OF INSULIN (HCC): Chronic | ICD-10-CM

## 2021-02-16 NOTE — TELEPHONE ENCOUNTER
Pharmacy is requesting medication refill.  Please approve or deny this request.    Rx requested:  Requested Prescriptions     Pending Prescriptions Disp Refills    glipiZIDE (GLUCOTROL XL) 2.5 MG extended release tablet [Pharmacy Med Name: glipiZIDE ER 2.5 MG Oral Tablet Extended Release 24 Hour] 90 tablet 0     Sig: Take 1 tablet by mouth daily    amLODIPine (NORVASC) 10 MG tablet [Pharmacy Med Name: amLODIPine Besylate 10 MG Oral Tablet] 90 tablet 0     Sig: Take 1 tablet by mouth daily         Last Office Visit:   6/12/2020      Next Visit Date:  Future Appointments   Date Time Provider Dawna Biggs   11/16/2021  9:15 AM Umesh Bettencourt MD [facility]

## 2021-02-20 RX ORDER — AMLODIPINE BESYLATE 10 MG/1
10 TABLET ORAL DAILY
Qty: 90 TABLET | Refills: 0 | Status: SHIPPED | OUTPATIENT
Start: 2021-02-20 | End: 2021-05-18

## 2021-02-20 RX ORDER — GLIPIZIDE 2.5 MG/1
2.5 TABLET, EXTENDED RELEASE ORAL DAILY
Qty: 90 TABLET | Refills: 0 | Status: SHIPPED | OUTPATIENT
Start: 2021-02-20 | End: 2021-05-18

## 2021-03-15 ENCOUNTER — TELEPHONE (OUTPATIENT)
Dept: FAMILY MEDICINE CLINIC | Age: 66
End: 2021-03-15

## 2021-03-15 NOTE — TELEPHONE ENCOUNTER
Patient is experiencing  pain in her toes and anlkle mostly at night makes it hard to sleep she would like to know if there is anything she can to to help this.     I scheduled her for March 30th with Dr. Michael Choudhary

## 2021-03-18 ENCOUNTER — OFFICE VISIT (OUTPATIENT)
Dept: FAMILY MEDICINE CLINIC | Age: 66
End: 2021-03-18
Payer: MEDICARE

## 2021-03-18 VITALS
OXYGEN SATURATION: 98 % | DIASTOLIC BLOOD PRESSURE: 80 MMHG | TEMPERATURE: 97.1 F | BODY MASS INDEX: 30.76 KG/M2 | HEIGHT: 67 IN | WEIGHT: 196 LBS | SYSTOLIC BLOOD PRESSURE: 128 MMHG | HEART RATE: 68 BPM

## 2021-03-18 DIAGNOSIS — E11.22 TYPE 2 DIABETES MELLITUS WITH STAGE 3B CHRONIC KIDNEY DISEASE, WITHOUT LONG-TERM CURRENT USE OF INSULIN (HCC): ICD-10-CM

## 2021-03-18 DIAGNOSIS — R20.8 DYSESTHESIA AFFECTING BOTH SIDES OF BODY: Primary | ICD-10-CM

## 2021-03-18 DIAGNOSIS — G25.81 RESTLESS LEGS: ICD-10-CM

## 2021-03-18 DIAGNOSIS — N18.32 TYPE 2 DIABETES MELLITUS WITH STAGE 3B CHRONIC KIDNEY DISEASE, WITHOUT LONG-TERM CURRENT USE OF INSULIN (HCC): ICD-10-CM

## 2021-03-18 PROCEDURE — 99213 OFFICE O/P EST LOW 20 MIN: CPT | Performed by: NURSE PRACTITIONER

## 2021-03-18 ASSESSMENT — ENCOUNTER SYMPTOMS
COLOR CHANGE: 0
VOMITING: 0
COUGH: 0
DIARRHEA: 0
NAUSEA: 0
ABDOMINAL PAIN: 0

## 2021-03-18 ASSESSMENT — PATIENT HEALTH QUESTIONNAIRE - PHQ9
SUM OF ALL RESPONSES TO PHQ QUESTIONS 1-9: 0
1. LITTLE INTEREST OR PLEASURE IN DOING THINGS: 0
2. FEELING DOWN, DEPRESSED OR HOPELESS: 0

## 2021-03-18 NOTE — PROGRESS NOTES
3/18/2021    SUBJECTIVE:     Mounika Reyes, 72 y.o. female presents today with:  Chief Complaint   Patient presents with    Ankle Pain     C/o ankle and foot pain. Patient reports having achy and burning sensation thats keep her up at night. Patient tried Tylendol with no relief     Foot Pain     HPI   Ankle and Foot Problem  Julia presents to [facility] with complaint of aching and pain in both feet and ankles. Onset: 3-4 months ago - unchanged since then. Symptoms are described as aching, tightness, and feeling like \"my ankles and feet are swollen, but I know they're not. \"   Pain is rated 8 /10. Symptoms are worse at night. Associated symptoms: urge to move her feet and ankles, worse at night  Symptoms are not exacerbated by activity. Symptoms are not worsened by weight of bed linens. Treatment to date: topical Aspercreme and topical Blue-Emu, which are somewhat helpful short-term and Tylenol arthritis, which helps for \"about an hour. \"   Also took a leftover vicodin from her dentist and it worked well. Relevant PMH: T2DM with CKD stage 3, urethral stricture, self catheterizes ~ 6x/day, knee arthritis, broken toe(s). Follows with urology and nephrology. Routinely monitors blood glucose-> AM readings are generally 130-140, typically < 135. Scheduled for COVID-19 vaccine 1/2 today. Review of Systems   Constitutional: Negative for chills and fever. Eyes: Negative for visual disturbance. Respiratory: Negative for cough. Cardiovascular: Negative for chest pain, palpitations and leg swelling. Gastrointestinal: Negative for abdominal pain, diarrhea, nausea and vomiting. Genitourinary: Positive for difficulty urinating (baseline- self-catheterizes, hx of urethral strictures). Musculoskeletal: Positive for arthralgias. Negative for gait problem and myalgias. Skin: Negative for color change, pallor and rash. Neurological: Negative for dizziness, tremors, weakness and numbness. OBJECTIVE:     Vitals:    03/18/21 1046   BP: 128/80   Site: Right Upper Arm   Position: Sitting   Cuff Size: Large Adult   Pulse: 68   Temp: 97.1 °F (36.2 °C)   TempSrc: Temporal   SpO2: 98%   Weight: 196 lb (88.9 kg)   Height: 5' 7\" (1.702 m)     Physical Exam  Vitals signs reviewed. Constitutional:       General: She is not in acute distress. Appearance: She is well-groomed. HENT:      Head: Normocephalic and atraumatic. Right Ear: External ear normal.      Left Ear: External ear normal.      Nose: Nose normal.      Mouth/Throat:      Lips: Pink. No lesions. Mouth: Mucous membranes are moist. No oral lesions. Eyes:      General: Lids are normal.      Extraocular Movements: Extraocular movements intact. Conjunctiva/sclera: Conjunctivae normal.      Pupils: Pupils are equal, round, and reactive to light. Neck:      Musculoskeletal: Normal range of motion and neck supple. Trachea: Trachea and phonation normal.   Cardiovascular:      Pulses:           Dorsalis pedis pulses are 1+ on the right side and 2+ on the left side. Posterior tibial pulses are 1+ on the right side and 1+ on the left side. Pulmonary:      Effort: Pulmonary effort is normal. No tachypnea. Musculoskeletal: Normal range of motion. Right ankle: Normal. She exhibits normal range of motion and no swelling. No tenderness. Left ankle: Normal. She exhibits normal range of motion and no swelling. No tenderness. Right foot: Normal range of motion and normal capillary refill. No bony tenderness, swelling, deformity, Charcot foot, foot drop or prominent metatarsal heads. Left foot: Normal range of motion and normal capillary refill. No bony tenderness, swelling, deformity, Charcot foot, foot drop or prominent metatarsal heads. Feet:      Right foot:      Skin integrity: Skin integrity normal. No ulcer, erythema, callus, dry skin or fissure.       Toenail Condition: Right toenails are normal. Left foot:      Skin integrity: Skin integrity normal. No ulcer, erythema, callus, dry skin or fissure. Toenail Condition: Left toenails are normal.   Skin:     General: Skin is warm and dry. Findings: No erythema or rash. Neurological:      General: No focal deficit present. Mental Status: She is alert. Mental status is at baseline. Gait: Gait is intact. Psychiatric:         Mood and Affect: Mood and affect normal.         Speech: Speech normal.         Behavior: Behavior normal. Behavior is cooperative. POC Testing Today: No results found for this visit on 21. ASSESSMENT & PLAN:   72 y.o. female with history of Type 2 DM with stage 3 CKD presenting with several months of dysesthetic sensations in bilateral ankles and feet and urgency to move legs and feet at night. Differential diagnosis includes diabetic neuropathy, vascular disease, msk pain. These are thought to be less likely given intact distal sensation to light touch, intact peripheral pulses, and absence of edema, deformity, or cool extremities. Labs ordered-  iron panel, magnesium, B12 and folate. Follow-up with PCP in the next two weeks as scheduled. Diagnoses and all orders for this visit:    ICD-10-CM    1. Dysesthesia affecting both sides of body  R20.8 Iron And Tibc     Ferritin     Transferrin     Magnesium     Vitamin B12 & Folate   2. Restless legs  G25.81 Iron And Tibc     Ferritin     Transferrin     Magnesium     Vitamin B12 & Folate   3. Type 2 diabetes mellitus with stage 3b chronic kidney disease, without long-term current use of insulin (HCC)  E11.21 Iron And Tibc    N18.32 Ferritin     Transferrin     Labs per orders  Follow-up with PCP as planned  Return or go to ER if symptoms are uncontrolled or new symptoms develop including numbness, weakness, color change, swelling, shortness of breath, any other concerns    Discussed symptom management for restless legs includin.  Reduce caffeine intake as applicable  2. Do activities that keep your mind alert during the day, such as crossword puzzles  3. Regular exercise of moderate intensity, massage legs  4. Heat application using heating pads or by taking a warm bath- do not sleep with heating pad  5. Avoid taking OTC medicines such as diphenhydramine that can make restless leg symptoms worse    Return for follow-up with Dr. Marce Beckham 3/30 as planned. Side effects and adverse effects of any medication prescribed today, as well as treatment plan/rationale, follow-up care, and result expectations have been discussed with the patient. [facility] expresses understanding and wishes to proceed with the plan. Discussed signs and symptoms which require immediate follow-up in ED/call to 911. Understanding verbalized. I have reviewed and updated the electronic medical record.     Electronically signed by VIKTORIA Jimenez CNP on 3/18/2021 at 3:54 PM

## 2021-03-18 NOTE — PATIENT INSTRUCTIONS
Labs ordered -> we'll call with results    Restless Legs: non-medication options for symptom management:  1. Mental alerting activities, such as working on a computer or doing crossword puzzles, at times of rest or boredom  2. Reduce caffeine intake as applicable  3. Do activities that keep your mind alert during the day, such as crossword puzzles  4. Get moderate regular exercise  5. Massage your legs (or have someone massage them)  6. Apply heat to your legs with heating pads or by taking a warm bath  7. Avoid taking medicines that can make RLS worse  These include antihistamines like diphenhydramine (sample brand name: Benadryl). Patient Education        Restless Legs Syndrome: Care Instructions  Your Care Instructions  Restless legs syndrome is a common nervous system problem. People with this syndrome feel a creeping, achy, or unpleasant feeling in the legs and an overpowering urge to move them. It often occurs in the evening and at night and can lead to sleep problems and tiredness. Your doctor may suggest doing a study of your sleep patterns to figure out what is happening when you try to sleep. Many people get relief from symptoms when they get regular exercise, eat well, and avoid caffeine, alcohol, and tobacco.  Follow-up care is a key part of your treatment and safety. Be sure to make and go to all appointments, and call your doctor if you are having problems. It's also a good idea to know your test results and keep a list of the medicines you take. How can you care for yourself at home? · Take your medicines exactly as prescribed. Call your doctor if you think you are having a problem with your medicine. · Try bathing in hot or cold water. Applying a heating pad or ice bag to your legs may also help symptoms. · Stretch and massage your legs before bed or when discomfort begins. · Get some exercise for at least 30 minutes a day on most days of the week.  Stop exercising at least 3 hours before bedtime. · Try to plan for situations where you will need to remain seated for long stretches. For example, if you are traveling by car, plan some stops so you can get out and walk around. · Tell your doctor about any medicines you are taking. This includes all over-the-counter, prescription, and herbal medicines. Some medicines, such as antidepressants, antihistamines, and cold and sinus medicines, can make your symptoms worse. · Avoid caffeine products, such as coffee, tea, cola, and chocolate. Caffeine can interrupt your sleep and stimulate you. · Do not smoke. Nicotine can make restless legs worse. If you need help quitting, talk to your doctor about stop-smoking programs and medicines. These can increase your chances of quitting for good. · Do not drink alcohol late in the evening. Take steps to help you sleep better  · Get plenty of sunlight in the outdoors, particularly later in the afternoon. · Use the evening hours for settling down. Avoid activities that challenge you in the hours before bedtime. · Eat meals at regular times, and do not snack before bedtime. · Keep your bedroom quiet, dark, and cool. Try using a sleep mask and earplugs to help you sleep. · Limit how much you drink at night to reduce your need to get up to urinate. But do not go to bed thirsty. · Run a fan or other steady \"white noise\" during the night if noises wake you up. · [place] the bed for sleeping and sex. Do your reading or TV watching in another room. · Once you are in bed, relax from head to toe, and guide your mind to pleasant thoughts. · Do not stay in bed longer than 8 hours, and try to avoid naps. When should you call for help? Watch closely for changes in your health, and be sure to contact your doctor if:    · You are still not getting enough sleep.     · Your symptoms become more severe or happen more often. Where can you learn more? Go to [email or web address] org and sign in to your

## 2021-03-19 ENCOUNTER — HOSPITAL ENCOUNTER (OUTPATIENT)
Dept: LAB | Age: 66
Discharge: HOME | End: 2021-03-19
Payer: MEDICARE

## 2021-03-19 DIAGNOSIS — E11.22 TYPE 2 DIABETES MELLITUS WITH STAGE 3B CHRONIC KIDNEY DISEASE, WITHOUT LONG-TERM CURRENT USE OF INSULIN (HCC): ICD-10-CM

## 2021-03-19 DIAGNOSIS — G25.81 RESTLESS LEGS: ICD-10-CM

## 2021-03-19 DIAGNOSIS — N18.32 TYPE 2 DIABETES MELLITUS WITH STAGE 3B CHRONIC KIDNEY DISEASE, WITHOUT LONG-TERM CURRENT USE OF INSULIN (HCC): ICD-10-CM

## 2021-03-19 DIAGNOSIS — R20.8 DYSESTHESIA AFFECTING BOTH SIDES OF BODY: ICD-10-CM

## 2021-03-19 LAB
FERRITIN: 105.3 NG/ML (ref 13–150)
FOLATE: 15.7 NG/ML (ref 7.3–26.1)
IRON SATURATION: 24 % (ref 11–46)
IRON: 72 UG/DL (ref 37–145)
MAGNESIUM: 2.2 MG/DL (ref 1.7–2.4)
TOTAL IRON BINDING CAPACITY: 306 UG/DL (ref 178–450)
VITAMIN B-12: 1512 PG/ML (ref 232–1245)

## 2021-03-19 PROCEDURE — 36415 COLL VENOUS BLD VENIPUNCTURE: CPT

## 2021-03-19 PROCEDURE — 82728 ASSAY OF FERRITIN: CPT

## 2021-03-19 PROCEDURE — 83540 ASSAY OF IRON: CPT

## 2021-03-19 PROCEDURE — 84466 ASSAY OF TRANSFERRIN: CPT

## 2021-03-19 PROCEDURE — 82746 ASSAY OF FOLIC ACID SERUM: CPT

## 2021-03-19 PROCEDURE — 83735 ASSAY OF MAGNESIUM: CPT

## 2021-03-19 PROCEDURE — 82607 VITAMIN B-12: CPT

## 2021-03-21 LAB — TRANSFERRIN: 265 MG/DL (ref 200–400)

## 2021-03-30 ENCOUNTER — HOSPITAL ENCOUNTER (OUTPATIENT)
Dept: ULTRASOUND IMAGING | Age: 66
Discharge: HOME | End: 2021-04-01
Payer: MEDICARE

## 2021-03-30 ENCOUNTER — OFFICE VISIT (OUTPATIENT)
Dept: FAMILY MEDICINE CLINIC | Age: 66
End: 2021-03-30
Payer: MEDICARE

## 2021-03-30 ENCOUNTER — HOSPITAL ENCOUNTER (OUTPATIENT)
Dept: GENERAL RADIOLOGY | Age: 66
Discharge: HOME | End: 2021-04-01
Payer: MEDICARE

## 2021-03-30 VITALS
WEIGHT: 197 LBS | BODY MASS INDEX: 30.92 KG/M2 | HEART RATE: 64 BPM | HEIGHT: 67 IN | TEMPERATURE: 98.1 F | OXYGEN SATURATION: 98 % | SYSTOLIC BLOOD PRESSURE: 138 MMHG | DIASTOLIC BLOOD PRESSURE: 82 MMHG

## 2021-03-30 DIAGNOSIS — G89.29 CHRONIC MIDLINE LOW BACK PAIN, UNSPECIFIED WHETHER SCIATICA PRESENT: ICD-10-CM

## 2021-03-30 DIAGNOSIS — M54.50 CHRONIC MIDLINE LOW BACK PAIN, UNSPECIFIED WHETHER SCIATICA PRESENT: ICD-10-CM

## 2021-03-30 DIAGNOSIS — I73.9 CLAUDICATION (HCC): ICD-10-CM

## 2021-03-30 DIAGNOSIS — M79.89 PAIN AND SWELLING OF LOWER EXTREMITY, UNSPECIFIED LATERALITY: ICD-10-CM

## 2021-03-30 DIAGNOSIS — R07.9 CHEST PAIN, UNSPECIFIED TYPE: ICD-10-CM

## 2021-03-30 DIAGNOSIS — N18.31 TYPE 2 DIABETES MELLITUS WITH STAGE 3A CHRONIC KIDNEY DISEASE, WITHOUT LONG-TERM CURRENT USE OF INSULIN (HCC): ICD-10-CM

## 2021-03-30 DIAGNOSIS — E11.22 TYPE 2 DIABETES MELLITUS WITH STAGE 3A CHRONIC KIDNEY DISEASE, WITHOUT LONG-TERM CURRENT USE OF INSULIN (HCC): ICD-10-CM

## 2021-03-30 DIAGNOSIS — M79.606 PAIN AND SWELLING OF LOWER EXTREMITY, UNSPECIFIED LATERALITY: ICD-10-CM

## 2021-03-30 DIAGNOSIS — Z78.0 POSTMENOPAUSAL: Primary | ICD-10-CM

## 2021-03-30 LAB — HBA1C MFR BLD: 6.2 %

## 2021-03-30 PROCEDURE — 72110 X-RAY EXAM L-2 SPINE 4/>VWS: CPT

## 2021-03-30 PROCEDURE — 99214 OFFICE O/P EST MOD 30 MIN: CPT | Performed by: FAMILY MEDICINE

## 2021-03-30 PROCEDURE — 93970 EXTREMITY STUDY: CPT

## 2021-03-30 PROCEDURE — 83036 HEMOGLOBIN GLYCOSYLATED A1C: CPT | Performed by: FAMILY MEDICINE

## 2021-03-30 SDOH — ECONOMIC STABILITY: FOOD INSECURITY: WITHIN THE PAST 12 MONTHS, THE FOOD YOU BOUGHT JUST DIDN'T LAST AND YOU DIDN'T HAVE MONEY TO GET MORE.: NEVER TRUE

## 2021-03-30 SDOH — ECONOMIC STABILITY: TRANSPORTATION INSECURITY
IN THE PAST 12 MONTHS, HAS THE LACK OF TRANSPORTATION KEPT YOU FROM MEDICAL APPOINTMENTS OR FROM GETTING MEDICATIONS?: NO

## 2021-03-30 SDOH — ECONOMIC STABILITY: INCOME INSECURITY: HOW HARD IS IT FOR YOU TO PAY FOR THE VERY BASICS LIKE FOOD, HOUSING, MEDICAL CARE, AND HEATING?: NOT HARD AT ALL

## 2021-03-30 SDOH — ECONOMIC STABILITY: TRANSPORTATION INSECURITY
IN THE PAST 12 MONTHS, HAS LACK OF TRANSPORTATION KEPT YOU FROM MEETINGS, WORK, OR FROM GETTING THINGS NEEDED FOR DAILY LIVING?: NO

## 2021-03-30 NOTE — PROGRESS NOTES
Subjective:      Patient ID: Baldemar Cruz is a 72 y.o. female who presents today for:     Chief Complaint   Patient presents with    Toe Pain     States her toes are aching and sometimes goes up the legs. Having problems sleeping due to it. States its that same kind of arthritic pain she gets in her knees. Pt takes tylenol arthritis     Health Maintenance     Would like DEXA       HPI  Patient is a very pleasant 59-year-old female presents today with complaints of aching and tingling in her toes. This has been ongoing for approximately 2 months. She was initially evaluated in walk-in clinic. B12 levels were mildly elevated and patient has discontinued her over-the-counter B12 supplement for approximately 1 week. She has not noticed a difference in her symptoms since discontinuing B12 supplementation. Patient does have a history of chronic back pain for over 10 years that is managed with Tylenol. She feels as though symptoms have not worsened and denies any significant limitation in activity. It  Is unclear whether walking or activity exacerbates the numbness. Lying flat with elevation does improve pain.    Past Medical History:   Diagnosis Date    Allergic rhinitis     Asthma     Chest pain, unspecified 8/20/2018    Endometrial cancer, grade I ([address] 2011    [facility]    Hypertension     Kidney problem     blockage in urethra and urine backs up has to get urethra dilated, has scar tissue    Type 2 diabetes mellitus with chronic kidney disease, without long-term current use of insulin ([address] 6/21/2019     Past Surgical History:   Procedure Laterality Date    CATARACT REMOVAL Left 11/2020    CHOLECYSTECTOMY  1990    HYSTERECTOMY  2011    endometrial cancer stage !a    URETHRAL STRICTURE DILATATION  2016    Dr. Cherelle Sierra      Family History   Problem Relation Age of Onset    Rheum Arthritis Mother     Osteoporosis Mother     No Known Problems Father         complications afte gallbladder surgery    Osteoarthritis Sister     Coronary Art Dis Brother     Heart Surgery Brother     Osteoarthritis Sister      Social History     Socioeconomic History    Marital status:      Spouse name: Not on file    Number of children: Not on file    Years of education: Not on file    Highest education level: Not on file   Occupational History    Not on file   Social Needs    Financial resource strain: Not hard at all   [place] insecurity     Worry: Never true     Inability: Never true   [employer] needs     Medical: No     Non-medical: No   Tobacco Use    Smoking status: Never Smoker    Smokeless tobacco: Never Used   Substance and Sexual Activity    Alcohol use: No    Drug use: No    Sexual activity: Not on file   Lifestyle    Physical activity     Days per week: Not on file     Minutes per session: Not on file    Stress: Not on file   Relationships    Social connections     Talks on phone: Not on file     Gets together: Not on file     Attends Restoration service: Not on file     Active member of club or organization: Not on file     Attends meetings of clubs or organizations: Not on file     Relationship status: Not on file    Intimate partner violence     Fear of current or ex partner: Not on file     Emotionally abused: Not on file     Physically abused: Not on file     Forced sexual activity: Not on file   Other Topics Concern    Not on file   Social History Narrative    Not on file     Current Outpatient Medications on File Prior to Visit   Medication Sig Dispense Refill    glipiZIDE (GLUCOTROL XL) 2.5 MG extended release tablet Take 1 tablet by mouth daily 90 tablet 0    amLODIPine (NORVASC) 10 MG tablet Take 1 tablet by mouth daily 90 tablet 0    Lancets (ONETOUCH DELICA PLUS [id number]) MISC USE   TO CHECK GLUCOSE THREE TIMES DAILY 100 each 0    blood glucose test strips (ONETOUCH ULTRA) strip USE  STRIP TO CHECK GLUCOSE THREE TIMES DAILY 100 each 0    atorvastatin (LIPITOR) 10 MG tablet Take 1 tablet by mouth daily 90 tablet 0    tamsulosin (FLOMAX) 0.4 MG capsule Take 1 capsule by mouth daily 90 capsule 3    nystatin (MYCOSTATIN) 098061 UNIT/GM cream Apply topically 2 times daily. 1 Tube 1    blood glucose monitor kit and supplies DX: diabetes mellitus. Test 3 time(s) daily - Ok to substitute per insurance 1 kit 1    VITAMIN D PO Take by mouth      Cyanocobalamin (VITAMIN B-12 PO) Take by mouth      APPLE CIDER VINEGAR PO Take by mouth      CRANBERRY PO Take by mouth      Alcohol Swabs PADS DX: diabetes mellitus. Test 1-3 time(s) daily - Ok to substitute per insurance (1 each = 1 box) 1 each 5    aspirin 81 MG tablet Take 81 mg by mouth daily       No current facility-administered medications on file prior to visit. Allergies:  Ciprofloxacin    Review of Systems   Constitutional: Negative for activity change, appetite change and fatigue. Respiratory: Negative for apnea, cough, chest tightness and shortness of breath. Cardiovascular: Negative for chest pain, palpitations and leg swelling. Gastrointestinal: Negative for abdominal pain, blood in stool, constipation, diarrhea, nausea and vomiting. Musculoskeletal: Negative for arthralgias. Neurological: Negative for seizures and headaches. Psychiatric/Behavioral: Negative for hallucinations and suicidal ideas. Objective:   /82   Pulse 64   Temp 98.1 °F (36.7 °C)   Ht 5' 7\" (1.702 m)   Wt 197 lb (89.4 kg)   LMP  (LMP Unknown)   SpO2 98%   BMI 30.85 kg/m²     Physical Exam  Vitals signs and nursing note reviewed. Constitutional:       General: She is not in acute distress. Appearance: Normal appearance. She is well-developed. She is not diaphoretic. HENT:      Head: Normocephalic and atraumatic. Nose: Nose normal.      Mouth/Throat:      Mouth: Mucous membranes are moist.      Pharynx: Oropharynx is clear.    Eyes:      Conjunctiva/sclera: Conjunctivae normal.      Pupils: Pupils are equal, round, and reactive to light. Neck:      Musculoskeletal: Normal range of motion. Cardiovascular:      Rate and Rhythm: Normal rate and regular rhythm. Heart sounds: Normal heart sounds. No murmur. No friction rub. No gallop. Pulmonary:      Effort: Pulmonary effort is normal. No respiratory distress. Breath sounds: Normal breath sounds. No wheezing or rales. Chest:      Chest wall: No tenderness. Abdominal:      General: Abdomen is flat. Bowel sounds are normal.      Palpations: Abdomen is soft. Tenderness: There is no abdominal tenderness. Skin:     General: Skin is warm and dry. Neurological:      Mental Status: She is alert and oriented to person, place, and time. Psychiatric:         Behavior: Behavior normal.         Thought Content: Thought content normal.         Judgment: Judgment normal.         Assessment & Plan:     1. Postmenopausal  We will obtain a DEXA scan  - DEXA BONE DENSITY AXIAL SKELETON; Future    2. Pain and swelling of lower extremity, unspecified laterality  UnClear etiology whether secondary to neuropathy or claudication  - US DUP LOWER EXTREMITIES BILATERAL VENOUS; Future  - Nestor Rae MD, Invasive Cardiology, [place]    3. Chronic midline low back pain, unspecified whether sciatica present  We will check an ultrasound to rule out clot due to the varicose veins that are much more pronounced  - US DUP LOWER EXTREMITIES BILATERAL VENOUS; Future  - XR LUMBAR SPINE (MIN 4 VIEWS); Future    4. Claudication [facility])  Concern for claudication  - US ALYSA THEODORE REST AND POST TREAD COMPLETE; Future    5. Type 2 diabetes mellitus with stage 3a chronic kidney disease, without long-term current use of insulin ([facility])  Hemoglobin A1c is well controlled  - POCT glycosylated hemoglobin (Hb A1C)    6.  Chest pain, unspecified type  Not Currently active, patient will follow up with Dr. Robert Roque MD, Invasive Cardiology, [place]      Return in about 1 month (around 4/30/2021) for AWV.     Marguerite Ann MD

## 2021-04-03 DIAGNOSIS — M54.16 LUMBAR RADICULOPATHY: Primary | ICD-10-CM

## 2021-04-03 ASSESSMENT — ENCOUNTER SYMPTOMS
BLOOD IN STOOL: 0
NAUSEA: 0
COUGH: 0
VOMITING: 0
CHEST TIGHTNESS: 0
ABDOMINAL PAIN: 0
CONSTIPATION: 0
DIARRHEA: 0
APNEA: 0
SHORTNESS OF BREATH: 0

## 2021-04-07 ENCOUNTER — TELEPHONE (OUTPATIENT)
Dept: FAMILY MEDICINE CLINIC | Age: 66
End: 2021-04-07

## 2021-04-07 DIAGNOSIS — G60.9 IDIOPATHIC PERIPHERAL NEUROPATHY: Primary | ICD-10-CM

## 2021-04-07 NOTE — TELEPHONE ENCOUNTER
Patient is calling in for pain in her feet she said she spoke with Dr. Keerthi Boo about this at her last visit and she was expecting a medication to be sent over to the [address] [facility] in [place] (she does not know the name of the medication.) She said the pharmacy have not received anything yet.

## 2021-04-08 RX ORDER — GABAPENTIN 100 MG/1
100 CAPSULE ORAL 3 TIMES DAILY
Qty: 90 CAPSULE | Refills: 0 | Status: SHIPPED | OUTPATIENT
Start: 2021-04-08 | End: 2021-05-04 | Stop reason: SDUPTHER

## 2021-04-14 ENCOUNTER — HOSPITAL ENCOUNTER (OUTPATIENT)
Dept: WOMENS IMAGING | Age: 66
Discharge: HOME | End: 2021-04-16
Payer: MEDICARE

## 2021-04-14 ENCOUNTER — HOSPITAL ENCOUNTER (OUTPATIENT)
Dept: ULTRASOUND IMAGING | Age: 66
Discharge: HOME | End: 2021-04-16
Payer: MEDICARE

## 2021-04-14 DIAGNOSIS — Z78.0 POSTMENOPAUSAL: ICD-10-CM

## 2021-04-14 DIAGNOSIS — I73.9 CLAUDICATION (HCC): ICD-10-CM

## 2021-04-14 PROCEDURE — 77080 DXA BONE DENSITY AXIAL: CPT

## 2021-04-14 PROCEDURE — 93924 LWR XTR VASC STDY BILAT: CPT

## 2021-04-14 PROCEDURE — 93924 LWR XTR VASC STDY BILAT: CPT | Performed by: INTERNAL MEDICINE

## 2021-04-27 ENCOUNTER — OFFICE VISIT (OUTPATIENT)
Dept: CARDIOLOGY CLINIC | Age: 66
End: 2021-04-27
Payer: MEDICARE

## 2021-04-27 VITALS
BODY MASS INDEX: 31.01 KG/M2 | WEIGHT: 198 LBS | OXYGEN SATURATION: 99 % | HEART RATE: 62 BPM | SYSTOLIC BLOOD PRESSURE: 132 MMHG | DIASTOLIC BLOOD PRESSURE: 84 MMHG | RESPIRATION RATE: 16 BRPM

## 2021-04-27 DIAGNOSIS — R01.1 MURMUR, CARDIAC: ICD-10-CM

## 2021-04-27 DIAGNOSIS — I10 ESSENTIAL HYPERTENSION: Primary | ICD-10-CM

## 2021-04-27 PROCEDURE — 99214 OFFICE O/P EST MOD 30 MIN: CPT | Performed by: INTERNAL MEDICINE

## 2021-04-27 PROCEDURE — 93000 ELECTROCARDIOGRAM COMPLETE: CPT | Performed by: INTERNAL MEDICINE

## 2021-04-27 ASSESSMENT — ENCOUNTER SYMPTOMS
COUGH: 0
STRIDOR: 0
VOMITING: 0
APNEA: 0
DIARRHEA: 0
CHEST TIGHTNESS: 0
WHEEZING: 0
NAUSEA: 0
BLOOD IN STOOL: 0
SHORTNESS OF BREATH: 0

## 2021-04-27 NOTE — PROGRESS NOTES
CHOLECYSTECTOMY  1990    HYSTERECTOMY  2011    endometrial cancer stage !a    URETHRAL STRICTURE DILATATION  2016    Dr. Hemant Ramey        Family History   Problem Relation Age of Onset    Rheum Arthritis Mother     Osteoporosis Mother     No Known Problems Father         complications afte gallbladder surgery    Osteoarthritis Sister     Coronary Art Dis Brother     Heart Surgery Brother     Osteoarthritis Sister        Social History     Socioeconomic History    Marital status:      Spouse name: None    Number of children: None    Years of education: None    Highest education level: None   Occupational History    None   Social Needs    Financial resource strain: Not hard at all   [place] insecurity     Worry: Never true     Inability: Never true    Transportation needs     Medical: No     Non-medical: No   Tobacco Use    Smoking status: Never Smoker    Smokeless tobacco: Never Used   Substance and Sexual Activity    Alcohol use: No    Drug use: No    Sexual activity: None   Lifestyle    Physical activity     Days per week: None     Minutes per session: None    Stress: None   Relationships    Social connections     Talks on phone: None     Gets together: None     Attends Alevism service: None     Active member of club or organization: None     Attends meetings of clubs or organizations: None     Relationship status: None    Intimate partner violence     Fear of current or ex partner: None     Emotionally abused: None     Physically abused: None     Forced sexual activity: None   Other Topics Concern    None   Social History Narrative    None       Allergies   Allergen Reactions    Ciprofloxacin        Current Outpatient Medications   Medication Sig Dispense Refill    gabapentin (NEURONTIN) 100 MG capsule Take 1 capsule by mouth 3 times daily for 30 days.  Intended supply: 30 days 90 capsule 0    glipiZIDE (GLUCOTROL XL) 2.5 MG extended release tablet Take 1 tablet by mouth daily 90 tablet 0    amLODIPine (NORVASC) 10 MG tablet Take 1 tablet by mouth daily 90 tablet 0    Lancets (ONETOUCH DELICA PLUS [id number]) MISC USE   TO CHECK GLUCOSE THREE TIMES DAILY 100 each 0    blood glucose test strips (ONETOUCH ULTRA) strip USE  STRIP TO CHECK GLUCOSE THREE TIMES DAILY 100 each 0    atorvastatin (LIPITOR) 10 MG tablet Take 1 tablet by mouth daily 90 tablet 0    tamsulosin (FLOMAX) 0.4 MG capsule Take 1 capsule by mouth daily 90 capsule 3    nystatin (MYCOSTATIN) 308639 UNIT/GM cream Apply topically 2 times daily. 1 Tube 1    blood glucose monitor kit and supplies DX: diabetes mellitus. Test 3 time(s) daily - Ok to substitute per insurance 1 kit 1    VITAMIN D PO Take by mouth      Cyanocobalamin (VITAMIN B-12 PO) Take by mouth      APPLE CIDER VINEGAR PO Take by mouth      CRANBERRY PO Take by mouth      Alcohol Swabs PADS DX: diabetes mellitus. Test 1-3 time(s) daily - Ok to substitute per insurance (1 each = 1 box) 1 each 5    aspirin 81 MG tablet Take 81 mg by mouth daily       No current facility-administered medications for this visit. Review of Systems:   Review of Systems   Constitutional: Negative for appetite change, diaphoresis and fatigue. HENT: Negative for nosebleeds. Respiratory: Negative for apnea, cough, chest tightness, shortness of breath, wheezing and stridor. Cardiovascular: Positive for palpitations. Negative for chest pain and leg swelling. Gastrointestinal: Negative for blood in stool, diarrhea, nausea and vomiting. Musculoskeletal: Negative for myalgias. Neurological: Negative for dizziness, seizures, syncope, weakness, light-headedness and headaches. Hematological: Does not bruise/bleed easily. Psychiatric/Behavioral: Negative for agitation, behavioral problems, confusion and suicidal ideas. The patient is not nervous/anxious and is not hyperactive. All other systems reviewed and are negative.       Review of System is negative except for as mentioned above. Physical Examination:    /84 (Site: Left Upper Arm, Position: Sitting, Cuff Size: Large Adult)   Pulse 62   Resp 16   Wt 198 lb (89.8 kg)   LMP  (LMP Unknown)   SpO2 99%   BMI 31.01 kg/m²    Physical Exam   Constitutional: She appears healthy. No distress. HENT:   Nose: Nose normal.   Mouth/Throat: Dentition is normal. Oropharynx is clear. Eyes: Pupils are equal, round, and reactive to light. Conjunctivae are normal.   Neck: Normal range of motion and thyroid normal. Neck supple. Cardiovascular: Regular rhythm, S1 normal, S2 normal, normal heart sounds, intact distal pulses and normal pulses. PMI is not displaced. No murmur heard. Pulmonary/Chest: She has no wheezes. She has no rales. She exhibits no tenderness. Abdominal: Soft. Bowel sounds are normal. She exhibits no distension and no mass. There is no splenomegaly or hepatomegaly. There is no abdominal tenderness. No hernia. Neurological: She is alert and oriented to person, place, and time. She has normal motor skills. Gait normal.   Skin: Skin is warm and dry. No cyanosis. No jaundice. Nails show no clubbing. Patient Active Problem List   Diagnosis    Urethral stricture    Retention of urine    Renal insufficiency syndrome    Renal failure syndrome    Renal cyst    Pancreas cyst    Osteoarthrosis involving lower leg    Essential hypertension    Asthma    Allergic rhinitis    Chest pain, unspecified    Type 2 diabetes mellitus with chronic kidney disease, without long-term current use of insulin ([facility] [address]           Orders Placed This Encounter   Procedures    EKG 12 lead     Order Specific Question:   Reason for Exam?     Answer:   Murmur    Echo 2D w doppler w color complete     Standing Status:   Future     Standing Expiration Date:   4/27/2022     Scheduling Instructions:       To be done in [facility] about a week     Order Specific Question:   Reason for exam:     Answer:

## 2021-04-30 DIAGNOSIS — E11.22 TYPE 2 DIABETES MELLITUS WITH STAGE 3 CHRONIC KIDNEY DISEASE, WITHOUT LONG-TERM CURRENT USE OF INSULIN (HCC): Chronic | ICD-10-CM

## 2021-04-30 DIAGNOSIS — N18.30 TYPE 2 DIABETES MELLITUS WITH STAGE 3 CHRONIC KIDNEY DISEASE, WITHOUT LONG-TERM CURRENT USE OF INSULIN (HCC): Chronic | ICD-10-CM

## 2021-04-30 RX ORDER — ATORVASTATIN CALCIUM 10 MG/1
TABLET, FILM COATED ORAL
Qty: 90 TABLET | Refills: 0 | Status: SHIPPED | OUTPATIENT
Start: 2021-04-30 | End: 2021-08-04 | Stop reason: SDUPTHER

## 2021-04-30 NOTE — TELEPHONE ENCOUNTER
Requesting medication refill.  Please approve or deny this request.    Rx requested:  Requested Prescriptions     Pending Prescriptions Disp Refills    atorvastatin (LIPITOR) 10 MG tablet [Pharmacy Med Name: Atorvastatin Calcium 10 MG Oral Tablet] 90 tablet 0     Sig: Take 1 tablet by mouth once daily       Last Office Visit:   3/30/2021    Last Filled:      Last Labs:      Next Visit Date:  Future Appointments   Date Time Provider Dawna Biggs   5/4/2021  9:00 AM Daniel Rodriguez MD [facility]   5/9/2021  9:30 AM [facility] ROOM 1 [facility] RAD   5/10/2021  9:30 AM [facility] ECHO  S. Jacqueline   5/25/2021  9:30 AM Cindi Franklin MD [address]   11/16/2021  9:15 AM Raphael Gandhi MD [facility]

## 2021-05-04 ENCOUNTER — OFFICE VISIT (OUTPATIENT)
Dept: FAMILY MEDICINE CLINIC | Age: 66
End: 2021-05-04
Payer: MEDICARE

## 2021-05-04 VITALS
SYSTOLIC BLOOD PRESSURE: 142 MMHG | TEMPERATURE: 96.8 F | BODY MASS INDEX: 30.04 KG/M2 | RESPIRATION RATE: 12 BRPM | WEIGHT: 198.2 LBS | OXYGEN SATURATION: 97 % | DIASTOLIC BLOOD PRESSURE: 80 MMHG | HEIGHT: 68 IN | HEART RATE: 71 BPM

## 2021-05-04 DIAGNOSIS — E11.9 TYPE 2 DIABETES MELLITUS WITHOUT COMPLICATION, WITHOUT LONG-TERM CURRENT USE OF INSULIN (HCC): ICD-10-CM

## 2021-05-04 DIAGNOSIS — Z00.00 ROUTINE GENERAL MEDICAL EXAMINATION AT A HEALTH CARE FACILITY: Primary | ICD-10-CM

## 2021-05-04 DIAGNOSIS — G60.9 IDIOPATHIC PERIPHERAL NEUROPATHY: ICD-10-CM

## 2021-05-04 DIAGNOSIS — M85.80 OSTEOPENIA, UNSPECIFIED LOCATION: ICD-10-CM

## 2021-05-04 PROCEDURE — G0402 INITIAL PREVENTIVE EXAM: HCPCS | Performed by: FAMILY MEDICINE

## 2021-05-04 RX ORDER — LANCETS 33 GAUGE
EACH MISCELLANEOUS
Qty: 100 EACH | Refills: 0 | Status: SHIPPED | OUTPATIENT
Start: 2021-05-04 | End: 2021-06-22

## 2021-05-04 RX ORDER — BLOOD SUGAR DIAGNOSTIC
STRIP MISCELLANEOUS
Qty: 100 EACH | Refills: 0 | Status: SHIPPED | OUTPATIENT
Start: 2021-05-04 | End: 2021-06-22

## 2021-05-04 RX ORDER — GABAPENTIN 100 MG/1
100 CAPSULE ORAL 3 TIMES DAILY
Qty: 90 CAPSULE | Refills: 0 | Status: SHIPPED | OUTPATIENT
Start: 2021-05-04 | End: 2021-06-10

## 2021-05-04 ASSESSMENT — PATIENT HEALTH QUESTIONNAIRE - PHQ9
SUM OF ALL RESPONSES TO PHQ QUESTIONS 1-9: 0
2. FEELING DOWN, DEPRESSED OR HOPELESS: 0
SUM OF ALL RESPONSES TO PHQ9 QUESTIONS 1 & 2: 0
1. LITTLE INTEREST OR PLEASURE IN DOING THINGS: 0
1. LITTLE INTEREST OR PLEASURE IN DOING THINGS: 0
2. FEELING DOWN, DEPRESSED OR HOPELESS: 0

## 2021-05-04 ASSESSMENT — ENCOUNTER SYMPTOMS
DIARRHEA: 0
COLOR CHANGE: 0
ABDOMINAL PAIN: 0
ABDOMINAL DISTENTION: 0
RHINORRHEA: 0
BLOOD IN STOOL: 0
APNEA: 0
NAUSEA: 0
FACIAL SWELLING: 0
BACK PAIN: 1
WHEEZING: 0
EYE DISCHARGE: 0
EYE REDNESS: 0
PHOTOPHOBIA: 0
EYE PAIN: 0
CONSTIPATION: 0
SINUS PRESSURE: 0
SHORTNESS OF BREATH: 0
EYE ITCHING: 0
CHOKING: 0
TROUBLE SWALLOWING: 0
ANAL BLEEDING: 0
CHEST TIGHTNESS: 0
COUGH: 0

## 2021-05-04 ASSESSMENT — LIFESTYLE VARIABLES
AUDIT-C TOTAL SCORE: INCOMPLETE
HOW OFTEN DO YOU HAVE A DRINK CONTAINING ALCOHOL: NEVER
AUDIT TOTAL SCORE: INCOMPLETE
HOW OFTEN DO YOU HAVE A DRINK CONTAINING ALCOHOL: 0

## 2021-05-04 NOTE — PROGRESS NOTES
Medicare Annual Wellness Visit  Name: Kendal Bell Date: 2021   MRN: [id number] Sex: Female   Age: 72 y.o. Ethnicity: Non-/Non    : 1955 Race: Terrance Dobson is here for Medicare AWV (PT PRESENTS FOR A AWV)  Patient states that she is doing well but still has lower back pain. She will be having an upcoming MRI and would like to wait upon that result prior to seeing a specialist     screenings for behavioral, psychosocial and functional/safety risks, and cognitive dysfunction are all negative except as indicated below. These results, as well as other patient data from the [address] form, are documented in Flowsheets linked to this Encounter. Allergies   Allergen Reactions    Ciprofloxacin          Prior to Visit Medications    Medication Sig Taking? Authorizing Provider   gabapentin (NEURONTIN) 100 MG capsule Take 1 capsule by mouth 3 times daily for 30 days. Intended supply: 30 days Yes [address], [place]   blood glucose test strips (ONETOUCH ULTRA) strip USE  STRIP TO CHECK GLUCOSE THREE TIMES DAILY Yes [address], [place]   Lancets (ONETOUCH DELICA PLUS [id number]) [address] [facility] USE   TO CHECK GLUCOSE THREE TIMES DAILY Yes Bree Moses MD   atorvastatin (LIPITOR) 10 MG tablet Take 1 tablet by mouth once daily Yes [address], [place]   glipiZIDE (GLUCOTROL XL) 2.5 MG extended release tablet Take 1 tablet by mouth daily Yes [address], [place]   amLODIPine (NORVASC) 10 MG tablet Take 1 tablet by mouth daily Yes Bree Moses MD   tamsulosin (FLOMAX) 0.4 MG capsule Take 1 capsule by mouth daily Yes Anabel Funk MD   nystatin (MYCOSTATIN) 607526 UNIT/GM cream Apply topically 2 times daily. Yes Sunil Swanson DO   blood glucose monitor kit and supplies DX: diabetes mellitus.  Test 3 time(s) daily - Ok to substitute per insurance Yes [address], [place]   VITAMIN D PO Take by mouth Yes Historical Provider, MD   Cyanocobalamin (VITAMIN B-12 PO) Take by mouth Yes Historical Provider, MD   APPLE CIDER VINEGAR PO Take by mouth Yes Historical Provider, MD   CRANBERRY PO Take by mouth Yes Historical Provider, MD   Alcohol Swabs PADS DX: diabetes mellitus.  Test 1-3 time(s) daily - Ok to substitute per insurance (1 each = 1 box) Yes Tata Mojica MD   aspirin 81 MG tablet Take 81 mg by mouth daily Yes Historical Provider, MD         Past Medical History:   Diagnosis Date    Allergic rhinitis     Asthma     Chest pain, unspecified 8/20/2018    Endometrial cancer, grade I ([facility] [address] 2011    [facility]    Hypertension     Kidney problem     blockage in urethra and urine backs up has to get urethra dilated, has scar tissue    Type 2 diabetes mellitus with chronic kidney disease, without long-term current use of insulin ([facility] [address] 6/21/2019       Past Surgical History:   Procedure Laterality Date    CATARACT REMOVAL Left 11/2020    CHOLECYSTECTOMY  1990    HYSTERECTOMY  2011    endometrial cancer stage !a    URETHRAL STRICTURE DILATATION  2016    Dr. Mikayla Johnston          Family History   Problem Relation Age of Onset    Rheum Arthritis Mother     Osteoporosis Mother     No Known Problems Father         complications afte gallbladder surgery    Osteoarthritis Sister     Coronary Art Dis Brother     Heart Surgery Brother     Osteoarthritis Sister        CareTeam (Including outside providers/suppliers regularly involved in providing care):   Patient Care Team:  Tata Mojica MD as PCP - General (Family Medicine)  Tata Mojica MD as PCP - [facility] Empaneled Provider  Ayanna Caballero MD as Cardiologist (Cardiology)    Wt Readings from Last 3 Encounters:   05/04/21 198 lb 3.2 oz (89.9 kg)   04/27/21 198 lb (89.8 kg)   03/30/21 197 lb (89.4 kg)     Vitals:    05/04/21 0911   BP: (!) 142/80   Site: Right Upper Arm   Position: Sitting   Cuff Size: Medium Adult   Pulse: 71   Resp: 12   Temp: 96.8 °F (36 °C)   TempSrc: Temporal   SpO2: 97%   Weight: 198 lb 3.2 oz (89.9 kg)   Height: 5' 8\" (1.727 m)     Body mass index is 30.14 kg/m². Based upon direct observation of the patient, evaluation of cognition reveals recent and remote memory intact. Review of Systems   Constitutional: Negative for activity change, appetite change, chills, diaphoresis, fatigue, fever and unexpected weight change. HENT: Negative for congestion, dental problem, ear discharge, ear pain, facial swelling, nosebleeds, rhinorrhea, sinus pressure, sneezing, tinnitus and trouble swallowing. Eyes: Negative for photophobia, pain, discharge, redness, itching and visual disturbance. Respiratory: Negative for apnea, cough, choking, chest tightness, shortness of breath and wheezing. Cardiovascular: Negative for chest pain, palpitations and leg swelling. Gastrointestinal: Negative for abdominal distention, abdominal pain, anal bleeding, blood in stool, constipation, diarrhea and nausea. Endocrine: Negative for cold intolerance, heat intolerance, polydipsia, polyphagia and polyuria. Genitourinary: Negative for difficulty urinating, dyspareunia, dysuria, enuresis, flank pain, frequency and hematuria. Musculoskeletal: Positive for back pain. Negative for arthralgias, gait problem, joint swelling, myalgias and neck pain. Skin: Negative for color change, pallor and rash. Allergic/Immunologic: Negative for environmental allergies, food allergies and immunocompromised state. Neurological: Positive for numbness. Negative for dizziness, tremors, seizures, syncope, facial asymmetry, speech difficulty, weakness, light-headedness and headaches. Psychiatric/Behavioral: Negative for agitation, behavioral problems, confusion, dysphoric mood, self-injury, sleep disturbance and suicidal ideas. Physical Exam  Vitals signs reviewed. Constitutional:       General: She is not in acute distress. Appearance: Normal appearance. She is well-developed and normal weight. She is not diaphoretic.    HENT:      Head: Normocephalic and atraumatic. Right Ear: Tympanic membrane, ear canal and external ear normal.      Left Ear: Tympanic membrane, ear canal and external ear normal.      Nose: Nose normal.      Mouth/Throat:      Mouth: Mucous membranes are moist.      Pharynx: Oropharynx is clear. No oropharyngeal exudate. Eyes:      General: No scleral icterus. Right eye: No discharge. Left eye: No discharge. Extraocular Movements: Extraocular movements intact. Conjunctiva/sclera: Conjunctivae normal.      Pupils: Pupils are equal, round, and reactive to light. Neck:      Musculoskeletal: Normal range of motion and neck supple. Thyroid: No thyromegaly. Vascular: No carotid bruit or JVD. Cardiovascular:      Rate and Rhythm: Normal rate and regular rhythm. Pulses: Normal pulses. Heart sounds: Normal heart sounds. No murmur. No friction rub. No gallop. Pulmonary:      Effort: Pulmonary effort is normal. No respiratory distress. Breath sounds: Normal breath sounds. No stridor. No wheezing or rales. Chest:      Chest wall: No tenderness. Abdominal:      General: Abdomen is flat. Bowel sounds are normal. There is no distension. Palpations: Abdomen is soft. There is no mass. Tenderness: There is no abdominal tenderness. There is no right CVA tenderness, left CVA tenderness, guarding or rebound. Musculoskeletal: Normal range of motion. General: No swelling, tenderness or deformity. Lumbar back: She exhibits pain. She exhibits no swelling, no edema and no spasm. Lymphadenopathy:      Cervical: No cervical adenopathy. Skin:     General: Skin is warm and dry. Coloration: Skin is not pale. Findings: No erythema or rash. Neurological:      General: No focal deficit present. Mental Status: She is alert and oriented to person, place, and time. Mental status is at baseline. Cranial Nerves: No cranial nerve deficit.       Motor: No removed or fastened?: (!) No  Do you have non-slip mats or surfaces in all bathtubs/showers?: Yes  Do all of your stairways have a railing or banister?: Yes  Are your doorways, halls and stairs free of clutter?: Yes  Do you always fasten your seatbelt when you are in a car?: Yes  Safety Interventions:  · Home safety tips provided     Personalized Preventive Plan   Current Health Maintenance Status  Immunization History   Administered Date(s) Administered    Influenza Vaccine, unspecified formulation 10/19/2009, 10/26/2012, 03/20/2015, 11/06/2016, 11/11/2017, 10/15/2018    Pneumococcal Polysaccharide ([id number]) 01/01/2008        Health Maintenance   Topic Date Due    Diabetic retinal exam  Never done    COVID-19 Vaccine (1) Never done    DTaP/Tdap/Td vaccine (1 - Tdap) Never done    Pneumococcal 65+ years Vaccine (1 of 1 - PPSV23) 08/14/2020    Annual Wellness Visit (AWV)  Never done    Diabetic microalbuminuria test  06/19/2021    Lipid screen  06/19/2021    Flu vaccine (Season Ended) 09/01/2021    Potassium monitoring  01/25/2022    Creatinine monitoring  01/25/2022    A1C test (Diabetic or Prediabetic)  03/30/2022    Diabetic foot exam  05/04/2022    Breast cancer screen  07/24/2022    Colon cancer screen colonoscopy  08/22/2022    Cervical cancer screen  09/17/2025    DEXA (modify frequency per FRAX score)  Completed    Shingles Vaccine  Completed    Hepatitis C screen  Completed    HIV screen  Completed    Hepatitis A vaccine  Aged Out    Hib vaccine  Aged Out    Meningococcal (ACWY) vaccine  Aged Out     Recommendations for [facility] Due: see orders and patient instructions/AVS.  . Recommended screening schedule for the next 5-10 years is provided to the patient in written form: see Patient Chris Dong was seen today for medicare awv.     Diagnoses and all orders for this visit:    Routine general medical examination at a health care facility    Idiopathic peripheral neuropathy  -     gabapentin (NEURONTIN) 100 MG capsule; Take 1 capsule by mouth 3 times daily for 30 days. Intended supply: 30 days    Type 2 diabetes mellitus without complication, without long-term current use of insulin (HCC)  -     blood glucose test strips (ONETOUCH ULTRA) strip; USE  STRIP TO CHECK GLUCOSE THREE TIMES DAILY  -     Lancets (ONETOUCH DELICA PLUS [id number]) MISC; USE   TO CHECK GLUCOSE THREE TIMES DAILY  -      DIABETES FOOT EXAM    Osteopenia, unspecified location    Other orders  -     Cancel: Pneumococcal polysaccharide vaccine 23-valent PPSV23                   Obesity Counseling: Assessed behavioral health risks and factors affecting choice of behavior. Suggested weight control approaches, including dietary changes behavioral modification and follow up plan. Provided educational and support documentation. Time spent (minutes): 1    Cardiovascular Disease Risk Counseling: Assessed the patient's risk to develop cardiovascular disease and reviewed main risk factors. Reviewed steps to reduce disease risk including:   · Quitting tobacco use, reducing amount smoked, or not starting the habit  · Making healthy food choices  · Being physically active and gradualy increasing activity levels   · Reduce weight and determine a healthy BMI goal  · Monitor blood pressure and treat if higher than 140/90 mmHg  · Maintain blood total cholesterol levels under 5 mmol/l or 190 mg/dl  · Maintain LDL cholesterol levels under 3.0 mmol/l or 115 mg/dl   · Control blood glucose levels  · Consider taking aspirin (75 mg daily), once blood pressure is controlled   Provided a follow up plan.   Time spent (minutes): 3

## 2021-05-04 NOTE — PATIENT INSTRUCTIONS
Body Mass Index: Care Instructions  Your Care Instructions     Body mass index (BMI) can help you see if your weight is raising your risk for health problems. It uses a formula to compare how much you weigh with how tall you are. · A BMI lower than 18.5 is considered underweight. · A BMI between 18.5 and 24.9 is considered healthy. · A BMI between 25 and 29.9 is considered overweight. A BMI of 30 or higher is considered obese. If your BMI is in the normal range, it means that you have a lower risk for weight-related health problems. If your BMI is in the overweight or obese range, you may be at increased risk for weight-related health problems, such as high blood pressure, heart disease, stroke, arthritis or joint pain, and diabetes. If your BMI is in the underweight range, you may be at increased risk for health problems such as fatigue, lower protection (immunity) against illness, muscle loss, bone loss, hair loss, and hormone problems. BMI is just one measure of your risk for weight-related health problems. You may be at higher risk for health problems if you are not active, you eat an unhealthy diet, or you drink too much alcohol or use tobacco products. Follow-up care is a key part of your treatment and safety. Be sure to make and go to all appointments, and call your doctor if you are having problems. It's also a good idea to know your test results and keep a list of the medicines you take. How can you care for yourself at home? · Practice healthy eating habits. This includes eating plenty of fruits, vegetables, whole grains, lean protein, and low-fat dairy. · If your doctor recommends it, get more exercise. Walking is a good choice. Bit by bit, increase the amount you walk every day. Try for at least 30 minutes on most days of the week. · Do not smoke. Smoking can increase your risk for health problems. If you need help quitting, talk to your doctor about stop-smoking programs and medicines. These can increase your chances of quitting for good. · Limit alcohol to 2 drinks a day for men and 1 drink a day for women. Too much alcohol can cause health problems. If you have a BMI higher than 25  · Your doctor may do other tests to check your risk for weight-related health problems. This may include measuring the distance around your waist. A waist measurement of more than 40 inches in men or 35 inches in women can increase the risk of weight-related health problems. · Talk with your doctor about steps you can take to stay healthy or improve your health. You may need to make lifestyle changes to lose weight and stay healthy, such as changing your diet and getting regular exercise. If you have a BMI lower than 18.5  · Your doctor may do other tests to check your risk for health problems. · Talk with your doctor about steps you can take to stay healthy or improve your health. You may need to make lifestyle changes to gain or maintain weight and stay healthy, such as getting more healthy foods in your diet and doing exercises to build muscle. Where can you learn more? Go to https://Reve[employer]berthaCognii.[employer]. org and sign in to your [employer] account. Enter S176 in the [employer] box to learn more about \"Body Mass Index: Care Instructions. \"     If you do not have an account, please click on the \"Sign Up Now\" link. Current as of: September 23, 2020               Content Version: 12.8  © 1767-1541 [employer]. Care instructions adapted under license by [facility] ([facility]). If you have questions about a medical condition or this instruction, always ask your healthcare professional. [place] [address] any warranty or liability for your use of this information. DASH Diet: Care Instructions  Your Care Instructions     The DASH diet is an eating plan that can help lower your blood pressure. DASH stands for Dietary Approaches to Stop Hypertension.  Hypertension is high blood pressure. The DASH diet focuses on eating foods that are high in calcium, potassium, and magnesium. These nutrients can lower blood pressure. The foods that are highest in these nutrients are fruits, vegetables, low-fat dairy products, nuts, seeds, and legumes. But taking calcium, potassium, and magnesium supplements instead of eating foods that are high in those nutrients does not have the same effect. The DASH diet also includes whole grains, fish, and poultry. The DASH diet is one of several lifestyle changes your doctor may recommend to lower your high blood pressure. Your doctor may also want you to decrease the amount of sodium in your diet. Lowering sodium while following the DASH diet can lower blood pressure even further than just the DASH diet alone. Follow-up care is a key part of your treatment and safety. Be sure to make and go to all appointments, and call your doctor if you are having problems. It's also a good idea to know your test results and keep a list of the medicines you take. How can you care for yourself at home? Following the DASH diet  · Eat 4 to 5 servings of fruit each day. A serving is 1 medium-sized piece of fruit, ½ cup chopped or canned fruit, 1/4 cup dried fruit, or 4 ounces (½ cup) of fruit juice. Choose fruit more often than fruit juice. · Eat 4 to 5 servings of vegetables each day. A serving is 1 cup of lettuce or raw leafy vegetables, ½ cup of chopped or cooked vegetables, or 4 ounces (½ cup) of vegetable juice. Choose vegetables more often than vegetable juice. · Get 2 to 3 servings of low-fat and fat-free dairy each day. A serving is 8 ounces of milk, 1 cup of yogurt, or 1 ½ ounces of cheese. · Eat 6 to 8 servings of grains each day. A serving is 1 slice of bread, 1 ounce of dry cereal, or ½ cup of cooked rice, pasta, or cooked cereal. Try to choose whole-grain products as much as possible. · Limit lean meat, poultry, and fish to 2 servings each day.  A serving is 3 ounces, about the size of a deck of cards. · Eat 4 to 5 servings of nuts, seeds, and legumes (cooked dried beans, lentils, and split peas) each week. A serving is 1/3 cup of nuts, 2 tablespoons of seeds, or ½ cup of cooked beans or peas. · Limit fats and oils to 2 to 3 servings each day. A serving is 1 teaspoon of vegetable oil or 2 tablespoons of salad dressing. · Limit sweets and added sugars to 5 servings or less a week. A serving is 1 tablespoon jelly or jam, ½ cup sorbet, or 1 cup of lemonade. · Eat less than 2,300 milligrams (mg) of sodium a day. If you limit your sodium to 1,500 mg a day, you can lower your blood pressure even more. · Be aware that all of these are the suggested number of servings for people who eat 1,800 to 2,000 calories a day. Your recommended number of servings may be different if you need more or fewer calories. Tips for success  · Start small. Do not try to make dramatic changes to your diet all at once. You might feel that you are missing out on your favorite foods and then be more likely to not follow the plan. Make small changes, and stick with them. Once those changes become habit, add a few more changes. · Try some of the following:  ? Make it a goal to eat a fruit or vegetable at every meal and at snacks. This will make it easy to get the recommended amount of fruits and vegetables each day. ? Try yogurt topped with fruit and nuts for a snack or healthy dessert. ? Add lettuce, tomato, cucumber, and onion to sandwiches. ? Combine a ready-made pizza crust with low-fat mozzarella cheese and lots of vegetable toppings. Try using tomatoes, squash, spinach, broccoli, carrots, cauliflower, and onions. ? Have a variety of cut-up vegetables with a low-fat dip as an appetizer instead of chips and dip. ? Sprinkle sunflower seeds or chopped almonds over salads. Or try adding chopped walnuts or almonds to cooked vegetables.   ? Try some vegetarian meals using beans and peas. Add garbanzo or kidney beans to salads. Make burritos and tacos with mashed fox beans or black beans. Where can you learn more? Go to https://DeLille Cellarsziyad.[employer]. org and sign in to your [employer] account. Enter N267 in the [facility] box to learn more about \"DASH Diet: Care Instructions. \"     If you do not have an account, please click on the \"Sign Up Now\" link. Current as of: August 31, 2020               Content Version: 12.8  © 5352-9722 [employer]. Care instructions adapted under license by [facility] ([place] [facility]). If you have questions about a medical condition or this instruction, always ask your healthcare professional. [address] any warranty or liability for your use of this information. Personalized Preventive Plan for Glenn Orellana - 5/4/2021  Medicare offers a range of preventive health benefits. Some of the tests and screenings are paid in full while other may be subject to a deductible, co-insurance, and/or copay. Some of these benefits include a comprehensive review of your medical history including lifestyle, illnesses that may run in your family, and various assessments and screenings as appropriate. After reviewing your medical record and screening and assessments performed today your provider may have ordered immunizations, labs, imaging, and/or referrals for you. A list of these orders (if applicable) as well as your Preventive Care list are included within your After Visit Summary for your review. Other Preventive Recommendations:    · A preventive eye exam performed by an eye specialist is recommended every 1-2 years to screen for glaucoma; cataracts, macular degeneration, and other eye disorders. · A preventive dental visit is recommended every 6 months. · Try to get at least 150 minutes of exercise per week or 10,000 steps per day on a pedometer .   · Order or download the FREE \"Exercise & Physical Activity: Your Everyday Guide\" from [employer]. Call 8-507.181.7242 or search [employer] online. · You need 3788-2352 mg of calcium and 3873-5412 IU of vitamin D per day. It is possible to meet your calcium requirement with diet alone, but a vitamin D supplement is usually necessary to meet this goal.  · When exposed to the sun, use a sunscreen that protects against both UVA and UVB radiation with an SPF of 30 or greater. Reapply every 2 to 3 hours or after sweating, drying off with a towel, or swimming. · Always wear a seat belt when traveling in a car. Always wear a helmet when riding a bicycle or motorcycle.

## 2021-05-05 ENCOUNTER — CLINICAL DOCUMENTATION (OUTPATIENT)
Dept: SPIRITUAL SERVICES | Age: 66
End: 2021-05-05

## 2021-05-05 NOTE — PROGRESS NOTES
Advance Care Planning    Ambulatory ACP Specialist Patient Outreach    Date:  5/5/2021  ACP Specialist:  Melania Grubbs    Outreach call to patient in follow-up to ACP Specialist referral from:  [x] PCP  [] Provider   [] Ambulatory Care Management [] Other for Reason:    [x] Continued Conversation for ACP decision making / Goals of Care  [] Code Status Discussion  [] Completion of Adv Directive  [] Completion of Portable DNR order  [] Other (Specify)    Date Referral Received: 5/3/21    Today's Outreach:  [x] First   [] Second  [] Third                               Third outreach made by []  phone  [x] email [x]   [employer]     Intervention:  [] Spoke with Patient  [x] Left VM requesting return call      Outcome: Called Patient. UTR. Left detailed message about ACP conversation. Will Call back in 1 Week. [employer] and Email on file with ACP docs and information. Next Step:   [] ACP scheduled conversation  [x] Outreach again in one week               [x] Email / Mail ACP Info Sheets  [x] Email / Mail Advance Directive            [] Close Referral. Routing closure to referring provider/staff and to ACP Specialist .      Thank you for this referral.

## 2021-05-09 ENCOUNTER — HOSPITAL ENCOUNTER (OUTPATIENT)
Dept: MRI IMAGING | Age: 66
Discharge: HOME | End: 2021-05-11
Payer: MEDICARE

## 2021-05-09 DIAGNOSIS — M54.16 LUMBAR RADICULOPATHY: ICD-10-CM

## 2021-05-09 PROCEDURE — 72148 MRI LUMBAR SPINE W/O DYE: CPT

## 2021-05-10 ENCOUNTER — HOSPITAL ENCOUNTER (OUTPATIENT)
Dept: NON INVASIVE DIAGNOSTICS | Age: 66
Discharge: HOME | End: 2021-05-10
Payer: MEDICARE

## 2021-05-10 DIAGNOSIS — R01.1 MURMUR, CARDIAC: ICD-10-CM

## 2021-05-10 DIAGNOSIS — I10 ESSENTIAL HYPERTENSION: ICD-10-CM

## 2021-05-10 LAB
LV EF: 50 %
LVEF MODALITY: NORMAL

## 2021-05-10 PROCEDURE — 93306 TTE W/DOPPLER COMPLETE: CPT

## 2021-05-12 ENCOUNTER — CLINICAL DOCUMENTATION (OUTPATIENT)
Dept: SPIRITUAL SERVICES | Age: 66
End: 2021-05-12

## 2021-05-12 DIAGNOSIS — M54.16 LUMBAR RADICULOPATHY: Primary | ICD-10-CM

## 2021-05-12 NOTE — PROGRESS NOTES
Advance Care Planning    Ambulatory ACP Specialist Patient Outreach    Date:  5/12/2021  ACP Specialist:  Kevin Jones    Outreach call to patient in follow-up to ACP Specialist referral from:  [x] PCP  [] Provider   [] Ambulatory Care Management [] Other for Reason:    [x] Continued Conversation for ACP decision making / Goals of Care  [] Code Status Discussion  [] Completion of Adv Directive  [] Completion of Portable DNR order  [] Other (Specify)    Date Referral Received:5/4/21    Today's Outreach:  [] First   [x] Second  [] Third                               Third outreach made by []  phone  [] email []   [employer]     Intervention:  [] Spoke with Patient  [x] Left VM requesting return call      Outcome:   Called patient to confirm receipt of ACP docs. Left detailed message. Will call back in 1 week. Next Step:   [] ACP scheduled conversation  [x] Outreach again in one week               [] Email / Mail ACP Info Sheets  [] Email / Mail Advance Directive            [] Close Referral. Routing closure to referring provider/staff and to ACP Specialist .      Thank you for this referral.

## 2021-05-13 ENCOUNTER — TELEPHONE (OUTPATIENT)
Dept: FAMILY MEDICINE CLINIC | Age: 66
End: 2021-05-13

## 2021-05-13 NOTE — TELEPHONE ENCOUNTER
Pt is wondering if you can order calcium for them. Pt states that they have called Monday about it and has heard no answer. Wants calcium supplement sent to [facility] in [place] please advise.

## 2021-05-14 DIAGNOSIS — M85.80 OSTEOPENIA, UNSPECIFIED LOCATION: Primary | ICD-10-CM

## 2021-05-14 RX ORDER — LYSINE HCL 500 MG
1 TABLET ORAL 2 TIMES DAILY
Qty: 60 TABLET | Refills: 1 | Status: SHIPPED | OUTPATIENT
Start: 2021-05-14 | End: 2022-01-28

## 2021-05-18 ENCOUNTER — OFFICE VISIT (OUTPATIENT)
Dept: CARDIOLOGY CLINIC | Age: 66
End: 2021-05-18
Payer: MEDICARE

## 2021-05-18 VITALS
OXYGEN SATURATION: 98 % | WEIGHT: 200 LBS | HEART RATE: 67 BPM | DIASTOLIC BLOOD PRESSURE: 86 MMHG | BODY MASS INDEX: 30.31 KG/M2 | HEIGHT: 68 IN | SYSTOLIC BLOOD PRESSURE: 142 MMHG

## 2021-05-18 DIAGNOSIS — I10 ESSENTIAL HYPERTENSION: ICD-10-CM

## 2021-05-18 DIAGNOSIS — I10 ESSENTIAL HYPERTENSION: Primary | ICD-10-CM

## 2021-05-18 DIAGNOSIS — R01.1 MURMUR, CARDIAC: ICD-10-CM

## 2021-05-18 DIAGNOSIS — E11.22 TYPE 2 DIABETES MELLITUS WITH STAGE 3 CHRONIC KIDNEY DISEASE, WITHOUT LONG-TERM CURRENT USE OF INSULIN (HCC): Chronic | ICD-10-CM

## 2021-05-18 DIAGNOSIS — N18.30 TYPE 2 DIABETES MELLITUS WITH STAGE 3 CHRONIC KIDNEY DISEASE, WITHOUT LONG-TERM CURRENT USE OF INSULIN (HCC): Chronic | ICD-10-CM

## 2021-05-18 PROCEDURE — 99213 OFFICE O/P EST LOW 20 MIN: CPT | Performed by: INTERNAL MEDICINE

## 2021-05-18 ASSESSMENT — ENCOUNTER SYMPTOMS
CHEST TIGHTNESS: 1
SHORTNESS OF BREATH: 0

## 2021-05-18 NOTE — PROGRESS NOTES
Patient called to report that he has been getting lightheaded/dizzy when he stands up. Pt states that his BP is averaging 100/60 HR 50. No answer on return call to discuss further, LVM requesting pt to return call. Will await.    [facility] CARDIOLOGY OFFICE FOLLOW-UP      Patient: Teresa Yeung  YOB: 1955  MRN: [id number]    Chief Complaint:  Chief Complaint   Patient presents with    Results     Echo test done    Heart Murmur         Subjective/HPI:  5/18/21: Patient presents today for follow-up of echo. Unremarkable. She is [place] mother-in-law. No chest pain no congestive heart failure no syncope. She will see me in 6 months      4/27/21: Patient presents today for follow-up of hypertension but has not seen me in 2 years. Moved here from [place] 10 years ago. Lives with  who is disabled. She is related to Stephie Johnsonnancy who is our echo supervisor at [facility] in [facility]. Diabetic. Denies any chest pain or congestive heart failure symptoms. Has dyslipidemia. Last echo showed 2+ mitral regurgitation. Had a nuclear stress test that was normal. I will repeat the echocardiogram at [facility] and see me           9/25/18: Patient presents today for HTN. Norvasc increased to 10. BP better. Echo shows 1+MR. Pimentel is normal.See in 1 year     8/21/18: Patient presents today for evaluation of chest pain. Consulted by Dr. Edda Richardson. Very pleasant 17-year-old white female who lives at home with her  who is disabled. She moved in from [place] in 10 years ago. Does not smoke. Chest pain is lower sternal area. No definite relation to exertion change of posture breathing. No nausea no vomiting. Occasionally may go to the jaw. EKG shows no acute changes. Brother had bypass and valve surgery. Has a history of hypertension which is well-controlled.  We will schedule for Lexiscan and echo and then see me        Past Medical History:   Diagnosis Date    Allergic rhinitis     Asthma     Chest pain, unspecified 8/20/2018    Endometrial cancer, grade I ([facility] [address] 2011    [facility]    Hypertension     Kidney problem     blockage in urethra and urine backs up has to get urethra dilated, has scar tissue    Type 2 diabetes mellitus with chronic kidney disease, without long-term current use of insulin ([facility] [address] 6/21/2019       Past Surgical History:   Procedure Laterality Date    CATARACT REMOVAL Left 11/2020    CHOLECYSTECTOMY  1990    HYSTERECTOMY  2011    endometrial cancer stage !a    URETHRAL STRICTURE DILATATION  2016    Dr. Brielle Harvey        Family History   Problem Relation Age of Onset    Rheum Arthritis Mother     Osteoporosis Mother     No Known Problems Father         complications afte gallbladder surgery    Osteoarthritis Sister     Coronary Art Dis Brother     Heart Surgery Brother     Osteoarthritis Sister        Social History     Socioeconomic History    Marital status:      Spouse name: Not on file    Number of children: Not on file    Years of education: Not on file    Highest education level: Not on file   Occupational History    Not on file   Tobacco Use    Smoking status: Never Smoker    Smokeless tobacco: Never Used   Substance and Sexual Activity    Alcohol use: No    Drug use: No    Sexual activity: Not on file   Other Topics Concern    Not on file   Social History Narrative    Not on file     Social Determinants of Health     Financial Resource Strain: Low Risk     Difficulty of Paying Living Expenses: Not hard at all   Food Insecurity: No Food Insecurity    Worried About [address] in the Last Year: Never true    [facility] in the Last Year: Never true   Transportation Needs: No Transportation Needs    Lack of Transportation (Medical): No    Lack of Transportation (Non-Medical):  No   Physical Activity:     Days of Exercise per Week:     Minutes of Exercise per Session:    Stress:     Feeling of Stress :    Social Connections:     Frequency of Communication with Friends and Family:     Frequency of Social Gatherings with Friends and Family:     Attends Zoroastrian Services:     Active Member of Clubs or Organizations:     Attends Club or Organization Meetings:     Marital Status:    Intimate Partner Violence:     Fear of Current or Ex-Partner:     Emotionally Abused:     Physically Abused:     Sexually Abused: Allergies   Allergen Reactions    Ciprofloxacin        Current Outpatient Medications   Medication Sig Dispense Refill    Calcium Carb-Cholecalciferol (CALCIUM 500 + D) 500-400 MG-UNIT TABS Take 1 tablet by mouth 2 times daily 60 tablet 1    gabapentin (NEURONTIN) 100 MG capsule Take 1 capsule by mouth 3 times daily for 30 days. Intended supply: 30 days 90 capsule 0    blood glucose test strips (ONETOUCH ULTRA) strip USE  STRIP TO CHECK GLUCOSE THREE TIMES DAILY 100 each 0    Lancets (ONETOUCH DELICA PLUS HYIOJA81F) MISC USE   TO CHECK GLUCOSE THREE TIMES DAILY 100 each 0    atorvastatin (LIPITOR) 10 MG tablet Take 1 tablet by mouth once daily 90 tablet 0    glipiZIDE (GLUCOTROL XL) 2.5 MG extended release tablet Take 1 tablet by mouth daily 90 tablet 0    amLODIPine (NORVASC) 10 MG tablet Take 1 tablet by mouth daily 90 tablet 0    tamsulosin (FLOMAX) 0.4 MG capsule Take 1 capsule by mouth daily 90 capsule 3    nystatin (MYCOSTATIN) 530371 UNIT/GM cream Apply topically 2 times daily. 1 Tube 1    blood glucose monitor kit and supplies DX: diabetes mellitus. Test 3 time(s) daily - Ok to substitute per insurance 1 kit 1    VITAMIN D PO Take by mouth      Cyanocobalamin (VITAMIN B-12 PO) Take by mouth      APPLE CIDER VINEGAR PO Take by mouth      CRANBERRY PO Take by mouth      Alcohol Swabs PADS DX: diabetes mellitus. Test 1-3 time(s) daily - Ok to substitute per insurance (1 each = 1 box) 1 each 5    aspirin 81 MG tablet Take 81 mg by mouth daily       No current facility-administered medications for this visit. Review of Systems:   Review of Systems   Constitutional: Negative for appetite change, diaphoresis and fatigue. Respiratory: Positive for chest tightness. Negative for apnea and shortness of breath. Cardiovascular: Positive for palpitations. Negative for chest pain and leg swelling. HAS HX OF PALPITATIONS   Musculoskeletal: Negative for myalgias. Skin: Negative for color change, pallor, rash and wound. Neurological: Negative for dizziness, syncope, weakness, light-headedness, numbness and headaches. Hematological: Does not bruise/bleed easily. Psychiatric/Behavioral: Negative for agitation, behavioral problems and confusion. The patient is not nervous/anxious and is not hyperactive. Review of System is negative except for as mentioned above. Physical Examination:    BP (!) 142/86 (Site: Left Upper Arm, Position: Sitting, Cuff Size: Large Adult)   Pulse 67   Ht 5' 8\" (1.727 m)   Wt 200 lb (90.7 kg)   LMP  (LMP Unknown)   SpO2 98%   BMI 30.41 kg/m²    Physical Exam   Constitutional: She appears healthy. HENT:   Nose: Nose normal.   Mouth/Throat: Dentition is normal. Oropharynx is clear. Eyes: Pupils are equal, round, and reactive to light. Cardiovascular: Normal rate, regular rhythm, S1 normal, S2 normal, normal heart sounds, intact distal pulses and normal pulses. No extrasystoles are present. Exam reveals no gallop. No murmur heard. Pulmonary/Chest: Effort normal and breath sounds normal. She has no wheezes. She has no rales. She exhibits no tenderness. Abdominal: Soft. Bowel sounds are normal. She exhibits no distension and no mass. There is no splenomegaly or hepatomegaly. There is no abdominal tenderness. Musculoskeletal:         General: No tenderness, deformity or edema. Normal range of motion. Cervical back: Normal range of motion. Neurological: She is alert and oriented to person, place, and time. She has normal motor skills and normal reflexes. Gait normal.   Skin: Skin is warm and dry.            Patient Active Problem List   Diagnosis    Urethral stricture    Retention of urine    Renal insufficiency syndrome    Renal failure syndrome    Renal cyst    Pancreas cyst    Osteoarthrosis involving lower leg    Essential hypertension    Asthma    Allergic rhinitis    Chest pain, unspecified    Type 2 diabetes mellitus with chronic kidney disease, without long-term current use of insulin (HCC)    Arteriosclerosis of coronary artery    Stage 4 chronic kidney disease (HCC)           No orders of the defined types were placed in this encounter. No orders of the defined types were placed in this encounter. Assessment/Orders:       ICD-10-CM    1. Essential hypertension  I10    2. Murmur, cardiac  R01.1        No orders of the defined types were placed in this encounter. There are no discontinued medications. No orders of the defined types were placed in this encounter. Plan:    Stay on same medications.     See me in 6 months        Electronically signed by: Nichole Azevedo MD  5/20/2021 4:35 PM

## 2021-05-18 NOTE — TELEPHONE ENCOUNTER
Pharmacy   requesting medication refill.  Please approve or deny this request.    Rx requested:  Requested Prescriptions     Pending Prescriptions Disp Refills    glipiZIDE (GLUCOTROL XL) 2.5 MG extended release tablet [Pharmacy Med Name: glipiZIDE ER 2.5 MG Oral Tablet Extended Release 24 Hour] 90 tablet 0     Sig: Take 1 tablet by mouth daily    amLODIPine (NORVASC) 10 MG tablet [Pharmacy Med Name: amLODIPine Besylate 10 MG Oral Tablet] 90 tablet 0     Sig: Take 1 tablet by mouth daily         Last Office Visit:   5/4/2021      Next Visit Date:  Future Appointments   Date Time Provider Dawna Biggs   8/4/2021  8:40 AM MD [facility]   11/2/2021  9:30 AM Tatiana Clark MD [address]   11/16/2021  9:15 AM Patricia Gonzáles MD [facility]   5/4/2022  9:00 AM MD Neeta [address]

## 2021-05-20 PROBLEM — I25.10 ARTERIOSCLEROSIS OF CORONARY ARTERY: Status: ACTIVE | Noted: 2021-05-20

## 2021-05-20 PROBLEM — N18.4 STAGE 4 CHRONIC KIDNEY DISEASE (HCC): Status: ACTIVE | Noted: 2021-05-20

## 2021-05-20 ASSESSMENT — ENCOUNTER SYMPTOMS
COLOR CHANGE: 0
APNEA: 0

## 2021-05-21 RX ORDER — AMLODIPINE BESYLATE 10 MG/1
10 TABLET ORAL DAILY
Qty: 90 TABLET | Refills: 0 | Status: SHIPPED | OUTPATIENT
Start: 2021-05-21 | End: 2021-08-24

## 2021-05-21 RX ORDER — GLIPIZIDE 2.5 MG/1
2.5 TABLET, EXTENDED RELEASE ORAL DAILY
Qty: 90 TABLET | Refills: 0 | Status: SHIPPED | OUTPATIENT
Start: 2021-05-21 | End: 2021-08-18

## 2021-06-09 DIAGNOSIS — G60.9 IDIOPATHIC PERIPHERAL NEUROPATHY: ICD-10-CM

## 2021-06-10 ENCOUNTER — TELEPHONE (OUTPATIENT)
Dept: FAMILY MEDICINE CLINIC | Age: 66
End: 2021-06-10

## 2021-06-10 RX ORDER — GABAPENTIN 100 MG/1
CAPSULE ORAL
Qty: 90 CAPSULE | Refills: 0 | Status: SHIPPED | OUTPATIENT
Start: 2021-06-10 | End: 2021-06-22 | Stop reason: DRUGHIGH

## 2021-06-21 ENCOUNTER — TELEPHONE (OUTPATIENT)
Dept: FAMILY MEDICINE CLINIC | Age: 66
End: 2021-06-21

## 2021-06-21 NOTE — TELEPHONE ENCOUNTER
Patient called stating that she spoke to you about increasing the gabapentin to 300 mg three times a day. The pharmacy[facility] has not received the refill request.  Patient can be reached at [phone].  Please advise

## 2021-06-22 DIAGNOSIS — E11.9 TYPE 2 DIABETES MELLITUS WITHOUT COMPLICATION, WITHOUT LONG-TERM CURRENT USE OF INSULIN (HCC): ICD-10-CM

## 2021-06-22 DIAGNOSIS — M54.16 LUMBAR RADICULOPATHY: Primary | ICD-10-CM

## 2021-06-22 RX ORDER — BLOOD SUGAR DIAGNOSTIC
STRIP MISCELLANEOUS
Qty: 100 EACH | Refills: 0 | Status: SHIPPED | OUTPATIENT
Start: 2021-06-22 | End: 2021-07-20

## 2021-06-22 RX ORDER — GABAPENTIN 300 MG/1
300 CAPSULE ORAL 3 TIMES DAILY
Qty: 90 CAPSULE | Refills: 1 | Status: SHIPPED | OUTPATIENT
Start: 2021-06-22 | End: 2021-08-23

## 2021-06-22 RX ORDER — LANCETS 33 GAUGE
EACH MISCELLANEOUS
Qty: 100 EACH | Refills: 0 | Status: SHIPPED | OUTPATIENT
Start: 2021-06-22 | End: 2021-07-20

## 2021-06-22 NOTE — TELEPHONE ENCOUNTER
Pharmacy is requesting medication refill.  Please approve or deny this request.    Rx requested:  Requested Prescriptions     Pending Prescriptions Disp Refills    Lancets (Lashelllind Edge) [address] [Pharmacy Med Name: Jaden MIX 50O MIS] 459 each 0     Sig: USE   TO CHECK GLUCOSE THREE TIMES DAILY    [address] strip [Pharmacy Med Name: OneTouch Ultra Blue In Vitro Strip] 100 each 0     Sig: USE  STRIP TO CHECK GLUCOSE THREE TIMES DAILY         Last Office Visit:   5/4/2021      Next Visit Date:  Future Appointments   Date Time Provider Department Center   7/14/2021 11:30 AM Robb Romberg, MD [facility] Belinda Silverman   8/4/2021  8:40 AM [address] [place] [address] MD Neeta Moses 94   11/2/2021  9:30 AM Melchor Laguna MD [address]   11/16/2021  9:15 AM Abdulkadir Bello MD [facility]   5/4/2022  9:00 AM [address] [place] [address] MD Neeta Moses 94

## 2021-07-14 ENCOUNTER — INITIAL CONSULT (OUTPATIENT)
Dept: NEUROSURGERY | Age: 66
End: 2021-07-14
Payer: MEDICARE

## 2021-07-14 VITALS — TEMPERATURE: 97.1 F | BODY MASS INDEX: 29.55 KG/M2 | WEIGHT: 195 LBS | HEIGHT: 68 IN

## 2021-07-14 DIAGNOSIS — M54.16 LUMBAR RADICULOPATHY: ICD-10-CM

## 2021-07-14 DIAGNOSIS — M54.9 BACK PAIN, UNSPECIFIED BACK LOCATION, UNSPECIFIED BACK PAIN LATERALITY, UNSPECIFIED CHRONICITY: ICD-10-CM

## 2021-07-14 DIAGNOSIS — M47.816 LUMBAR SPONDYLOSIS: Primary | ICD-10-CM

## 2021-07-14 PROCEDURE — 99203 OFFICE O/P NEW LOW 30 MIN: CPT | Performed by: NEUROLOGICAL SURGERY

## 2021-07-14 NOTE — PROGRESS NOTES
[facility] ([facility]) Physicians  Neurosurgery and Pain [facility]  [address], [address] , [address], [place] [address]: [phone]  F: [phone]       Maximilian Overall  ([id number])    7/14/2021    Referred by No ref.  provider found    Subjective:     Maximilian Overall is 72 y.o. female who complains today of:    Chief Complaint   Patient presents with    Back Pain     HPI    Allergies:  Ciprofloxacin    Past Medical History:   Diagnosis Date    Allergic rhinitis     Asthma     Chest pain, unspecified 8/20/2018    Endometrial cancer, grade I ([facility] [address] 2011    [facility]    Hypertension     Kidney problem     blockage in urethra and urine backs up has to get urethra dilated, has scar tissue    Type 2 diabetes mellitus with chronic kidney disease, without long-term current use of insulin ([facility] [address] 6/21/2019     Past Surgical History:   Procedure Laterality Date    CATARACT REMOVAL Left 11/2020    CHOLECYSTECTOMY  1990    HYSTERECTOMY  2011    endometrial cancer stage !a    URETHRAL STRICTURE DILATATION  2016    Dr. Rob Lamar      Family History   Problem Relation Age of Onset    Rheum Arthritis Mother     Osteoporosis Mother     No Known Problems Father         complications afte gallbladder surgery    Osteoarthritis Sister     Coronary Art Dis Brother     Heart Surgery Brother     Osteoarthritis Sister      Social History     Socioeconomic History    Marital status:      Spouse name: Not on file    Number of children: Not on file    Years of education: Not on file    Highest education level: Not on file   Occupational History    Not on file   Tobacco Use    Smoking status: Never Smoker    Smokeless tobacco: Never Used   Substance and Sexual Activity    Alcohol use: No    Drug use: No    Sexual activity: Not on file   Other Topics Concern    Not on file   Social History Narrative    Not on file     Social Determinants of Health     Financial Resource Strain: Low Risk     Difficulty of Paying Living Expenses: Not hard at all   Food Insecurity: No Food Insecurity    Worried About Running Out of Food in the Last Year: Never true    Daly of Food in the Last Year: Never true   Transportation Needs: No Transportation Needs    Lack of Transportation (Medical): No    Lack of Transportation (Non-Medical): No   Physical Activity:     Days of Exercise per Week:     Minutes of Exercise per Session:    Stress:     Feeling of Stress :    Social Connections:     Frequency of Communication with Friends and Family:     Frequency of Social Gatherings with Friends and Family:     Attends Buddhist Services:     Active Member of Clubs or Organizations:     Attends Club or Organization Meetings:     Marital Status:    Intimate Partner Violence:     Fear of Current or Ex-Partner:     Emotionally Abused:     Physically Abused:     Sexually Abused:        Current Outpatient Medications on File Prior to Visit   Medication Sig Dispense Refill    Lancets (ONETOUCH DELICA PLUS [id number]) MISC USE   TO CHECK GLUCOSE THREE TIMES DAILY 100 each 0    ONETOUCH ULTRA strip USE  STRIP TO CHECK GLUCOSE THREE TIMES DAILY 100 each 0    gabapentin (NEURONTIN) 300 MG capsule Take 1 capsule by mouth 3 times daily for 180 days. Intended supply: 30 days 90 capsule 1    glipiZIDE (GLUCOTROL XL) 2.5 MG extended release tablet Take 1 tablet by mouth daily 90 tablet 0    amLODIPine (NORVASC) 10 MG tablet Take 1 tablet by mouth daily 90 tablet 0    Calcium Carb-Cholecalciferol (CALCIUM 500 + D) 500-400 MG-UNIT TABS Take 1 tablet by mouth 2 times daily 60 tablet 1    atorvastatin (LIPITOR) 10 MG tablet Take 1 tablet by mouth once daily 90 tablet 0    tamsulosin (FLOMAX) 0.4 MG capsule Take 1 capsule by mouth daily 90 capsule 3    nystatin (MYCOSTATIN) 224098 UNIT/GM cream Apply topically 2 times daily. 1 Tube 1    blood glucose monitor kit and supplies DX: diabetes mellitus.  Test 3 time(s) daily - Ok to substitute per insurance 1 kit 1    VITAMIN D PO Take by mouth      Cyanocobalamin (VITAMIN B-12 PO) Take by mouth      APPLE CIDER VINEGAR PO Take by mouth      CRANBERRY PO Take by mouth      Alcohol Swabs PADS DX: diabetes mellitus. Test 1-3 time(s) daily - Ok to substitute per insurance (1 each = 1 box) 1 each 5    aspirin 81 MG tablet Take 81 mg by mouth daily       No current facility-administered medications on file prior to visit. Review of Systems      Objective:     Vitals:  Temp 97.1 °F (36.2 °C)   Ht 5' 7.5\" (1.715 m)   Wt 195 lb (88.5 kg)   LMP  (LMP Unknown)   BMI 30.09 kg/m²        Physical Exam      Assessment:      Diagnosis Orders   1. Lumbar spondylosis     2. Back pain, unspecified back location, unspecified back pain laterality, unspecified chronicity     3. Lumbar radiculopathy         Plan:     Patient comes to my office with a chief complaint of back pain with bilateral leg radiculopathy. 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 Chronic pain has been getting worse lately to the point and patient is here for further evaluation. There is a pain radiating into the left buttock and right shin. Also complains of bilateral feet toe and ankle pain more on the right than the left. Again left-sided buttock pain worse in the right but more on the right foot pain than the left. No bowel bladder incontinence. Pain is worse with activities movement. From no conservative treatment. Currently taking Neurontin as well as mixture of Tylenol and ibuprofen with some benefit. Overall this pain does not hinder her daily activities. Clinically shows good range of motion gait is normal straight leg raising negative intact motor sensor reflex findings other than mild tenderness on palpation on the right shin.     MRI of lumbar spine was reviewed by me personally and compared to the report. Impression: Multilevel lumbar spondylosis degenerative changes at surgery throughout the whole lumbar spine worse at L4-5 and L5-S1. Osteoporosis is noted also based on DEXA scan which was reviewed. In light of overall absence of conservative treatments, patient is recommend to undergo pain management or therapy which she refused at this point. She was instructed to follow-up with her family physician or seek a neurosurgery evaluation should they have any worsening pain or increasing weakness or numbness. Follow up:  Return if symptoms worsen or fail to improve.     Antoine Vasquez MD

## 2021-07-15 ENCOUNTER — CLINICAL DOCUMENTATION (OUTPATIENT)
Dept: SPIRITUAL SERVICES | Age: 66
End: 2021-07-15

## 2021-07-15 NOTE — PROGRESS NOTES
Advance Care Planning    Ambulatory ACP Specialist Patient Outreach    Date:  7/15/2021  ACP Specialist:  Annette Pearl    Outreach call to patient in follow-up to ACP Specialist referral from: Perez Tran MD    [x] PCP  [] Provider   [] Ambulatory Care Management [] Other for Reason:    [x] Advance Directive Assistance  [] Code Status Discussion  [] Complete Portable DNR Order  [] Discuss Goals of Care  [] Complete POST/MOST  [] Early ACP Decision-Making  [] Other    Date Referral Received: Today's Outreach:  [] First   [] Second  [x] Third                               Third outreach made by [x]  phone  [] email []   SCP Events     Intervention:  [] Spoke with Patient  [x] Left VM requesting return call      Outcome: Left detailed message for patient. Next Step:   [] ACP scheduled conversation  [] Outreach again in one week               [] Email / Mail ACP Info Sheets  [] Email / Mail Advance Directive            [x] Close Referral. Routing closure to referring provider/staff and to ACP Specialist .      Thank you for this referral.

## 2021-07-20 DIAGNOSIS — E11.9 TYPE 2 DIABETES MELLITUS WITHOUT COMPLICATION, WITHOUT LONG-TERM CURRENT USE OF INSULIN (HCC): ICD-10-CM

## 2021-07-20 RX ORDER — BLOOD SUGAR DIAGNOSTIC
STRIP MISCELLANEOUS
Qty: 100 EACH | Refills: 0 | Status: SHIPPED | OUTPATIENT
Start: 2021-07-20 | End: 2021-09-02

## 2021-07-20 RX ORDER — LANCETS 33 GAUGE
EACH MISCELLANEOUS
Qty: 100 EACH | Refills: 0 | Status: SHIPPED | OUTPATIENT
Start: 2021-07-20 | End: 2021-09-02

## 2021-07-20 NOTE — TELEPHONE ENCOUNTER
Pharmacy is requesting medication refill.  Please approve or deny this request.    Rx requested:  Requested Prescriptions     Pending Prescriptions Disp Refills    Lancets ([address]) [address] [facility] [Pharmacy Med Name: Segundo MIX 61D MIS] 192 each 0     Sig: USE 1  TO CHECK GLUCOSE THREE TIMES DAILY    blood glucose test strips (ONETOUCH ULTRA) strip [Pharmacy Med Name: OneTouch Ultra Blue In Vitro Strip] 100 each 0     Sig: USE 1 STRIP  [address]         Last Office Visit:   5/4/2021      Next Visit Date:  Future Appointments   Date Time Provider Department Center   8/4/2021  8:40 AM [address], MD [address] [address] [place] [address]   11/2/2021  9:30 AM Cesilia Lechuga MD [address]   11/16/2021  9:15 AM Russel Bronson MD [facility]   5/4/2022  9:00 AM [address], MD [address]

## 2021-08-04 ENCOUNTER — OFFICE VISIT (OUTPATIENT)
Dept: FAMILY MEDICINE CLINIC | Age: 66
End: 2021-08-04
Payer: MEDICARE

## 2021-08-04 VITALS
BODY MASS INDEX: 32.68 KG/M2 | WEIGHT: 208.2 LBS | DIASTOLIC BLOOD PRESSURE: 72 MMHG | HEIGHT: 67 IN | HEART RATE: 65 BPM | SYSTOLIC BLOOD PRESSURE: 132 MMHG | TEMPERATURE: 97.6 F | OXYGEN SATURATION: 100 %

## 2021-08-04 DIAGNOSIS — E78.5 HYPERLIPIDEMIA, UNSPECIFIED HYPERLIPIDEMIA TYPE: ICD-10-CM

## 2021-08-04 DIAGNOSIS — M85.80 OSTEOPENIA, UNSPECIFIED LOCATION: ICD-10-CM

## 2021-08-04 DIAGNOSIS — E11.22 TYPE 2 DIABETES MELLITUS WITH STAGE 3A CHRONIC KIDNEY DISEASE, WITHOUT LONG-TERM CURRENT USE OF INSULIN (HCC): ICD-10-CM

## 2021-08-04 DIAGNOSIS — M54.16 LUMBAR RADICULOPATHY: Primary | ICD-10-CM

## 2021-08-04 DIAGNOSIS — N18.31 TYPE 2 DIABETES MELLITUS WITH STAGE 3A CHRONIC KIDNEY DISEASE, WITHOUT LONG-TERM CURRENT USE OF INSULIN (HCC): ICD-10-CM

## 2021-08-04 PROCEDURE — 99214 OFFICE O/P EST MOD 30 MIN: CPT | Performed by: FAMILY MEDICINE

## 2021-08-04 RX ORDER — ATORVASTATIN CALCIUM 10 MG/1
10 TABLET, FILM COATED ORAL DAILY
Qty: 90 TABLET | Refills: 0 | Status: SHIPPED | OUTPATIENT
Start: 2021-08-04 | End: 2021-11-04

## 2021-08-04 NOTE — PROGRESS NOTES
Subjective:      Patient ID: Rand Lovelace is a 72 y.o. female who presents today for:     Chief Complaint   Patient presents with   Andre ARDON  Patient is a very pleasant 66-year-old female presents today to follow-up on chronic concerns. She has been able to follow-up with neurosurgery regarding chronic lower back pain. She would not like to pursue a surgical route and feels as though gabapentin is adequate in controlling her symptoms. Patient has a history of chronic kidney disease and is in the care of nephrology.   She is currently utilizing calcium and vitamin D supplementation for osteopenia  Past Medical History:   Diagnosis Date    Allergic rhinitis     Asthma     Chest pain, unspecified 8/20/2018    Endometrial cancer, grade I ([facility] [address] 2011    [facility]    Hypertension     Kidney problem     blockage in urethra and urine backs up has to get urethra dilated, has scar tissue    Type 2 diabetes mellitus with chronic kidney disease, without long-term current use of insulin ([facility] [address] 6/21/2019     Past Surgical History:   Procedure Laterality Date    CATARACT REMOVAL Left 11/2020    CHOLECYSTECTOMY  1990    HYSTERECTOMY  2011    endometrial cancer stage !a    URETHRAL STRICTURE DILATATION  2016    Dr. Nikki Nova      Family History   Problem Relation Age of Onset    Rheum Arthritis Mother     Osteoporosis Mother     No Known Problems Father         complications afte gallbladder surgery    Osteoarthritis Sister     Coronary Art Dis Brother     Heart Surgery Brother     Osteoarthritis Sister      Social History     Socioeconomic History    Marital status:      Spouse name: Not on file    Number of children: Not on file    Years of education: Not on file    Highest education level: Not on file   Occupational History    Not on file   Tobacco Use    Smoking status: Never Smoker    Smokeless tobacco: Never Used   Substance and Sexual Activity    Alcohol use: No    Drug use: No    Sexual activity: Not on file   Other Topics Concern    Not on file   Social History Narrative    Not on file     Social Determinants of Health     Financial Resource Strain: Low Risk     Difficulty of Paying Living Expenses: Not hard at all   Food Insecurity: No Food Insecurity    Worried About Running Out of Food in the Last Year: Never true    Daly of Food in the Last Year: Never true   Transportation Needs: No Transportation Needs    Lack of Transportation (Medical): No    Lack of Transportation (Non-Medical): No   Physical Activity:     Days of Exercise per Week:     Minutes of Exercise per Session:    Stress:     Feeling of Stress :    Social Connections:     Frequency of Communication with Friends and Family:     Frequency of Social Gatherings with Friends and Family:     Attends Orthodox Services:     Active Member of Clubs or Organizations:     Attends Club or Organization Meetings:     Marital Status:    Intimate Partner Violence:     Fear of Current or Ex-Partner:     Emotionally Abused:     Physically Abused:     Sexually Abused:      Current Outpatient Medications on File Prior to Visit   Medication Sig Dispense Refill    Lancets (ONETOUCH DELICA PLUS [id number]) MISC USE 1   [address] 100 each 0    blood glucose test strips (ONETOUCH ULTRA) strip USE 1 STRIP TO CHECK GLUCOSE THREE TIMES DAILY 100 each 0    gabapentin (NEURONTIN) 300 MG capsule Take 1 capsule by mouth 3 times daily for 180 days.  Intended supply: 30 days 90 capsule 1    glipiZIDE (GLUCOTROL XL) 2.5 MG extended release tablet Take 1 tablet by mouth daily 90 tablet 0    amLODIPine (NORVASC) 10 MG tablet Take 1 tablet by mouth daily 90 tablet 0    Calcium Carb-Cholecalciferol (CALCIUM 500 + D) 500-400 MG-UNIT TABS Take 1 tablet by mouth 2 times daily 60 tablet 1    tamsulosin (FLOMAX) 0.4 MG capsule Take 1 capsule by mouth daily 90 capsule 3    nystatin (MYCOSTATIN) 932149 UNIT/GM cream Apply topically 2 times daily. 1 Tube 1    blood glucose monitor kit and supplies DX: diabetes mellitus. Test 3 time(s) daily - Ok to substitute per insurance 1 kit 1    VITAMIN D PO Take by mouth      Cyanocobalamin (VITAMIN B-12 PO) Take by mouth      CRANBERRY PO Take by mouth      Alcohol Swabs PADS DX: diabetes mellitus. Test 1-3 time(s) daily - Ok to substitute per insurance (1 each = 1 box) 1 each 5    aspirin 81 MG tablet Take 81 mg by mouth daily      APPLE CIDER VINEGAR PO Take by mouth (Patient not taking: Reported on 8/4/2021)       No current facility-administered medications on file prior to visit. Allergies:  Ciprofloxacin    Review of Systems   Constitutional: Positive for activity change. Negative for appetite change, fatigue and unexpected weight change. Respiratory: Negative for apnea, cough, chest tightness, shortness of breath and wheezing. Cardiovascular: Negative for chest pain, palpitations and leg swelling. Gastrointestinal: Negative for abdominal pain, constipation, diarrhea and vomiting. Musculoskeletal: Positive for arthralgias, back pain and gait problem. Neurological: Negative for seizures, weakness and numbness. Psychiatric/Behavioral: Negative for agitation, hallucinations and suicidal ideas. Objective:   /72 (Site: Right Upper Arm, Position: Sitting, Cuff Size: Medium Adult)   Pulse 65   Temp 97.6 °F (36.4 °C) (Temporal)   Ht 5' 7\" (1.702 m)   Wt 208 lb 3.2 oz (94.4 kg)   LMP  (LMP Unknown)   SpO2 100%   Breastfeeding No   BMI 32.61 kg/m²     Physical Exam  Vitals reviewed. Constitutional:       General: She is not in acute distress. Appearance: Normal appearance. She is well-developed and normal weight. She is not diaphoretic. HENT:      Head: Normocephalic and atraumatic.       Right Ear: Tympanic membrane, ear canal and external ear normal.      Left Ear: Tympanic membrane, ear canal and external ear normal. Nose: Nose normal.      Mouth/Throat:      Mouth: Mucous membranes are moist.      Pharynx: Oropharynx is clear. No oropharyngeal exudate. Eyes:      General: No scleral icterus. Right eye: No discharge. Left eye: No discharge. Extraocular Movements: Extraocular movements intact. Conjunctiva/sclera: Conjunctivae normal.      Pupils: Pupils are equal, round, and reactive to light. Neck:      Thyroid: No thyromegaly. Vascular: No carotid bruit or JVD. Cardiovascular:      Rate and Rhythm: Normal rate and regular rhythm. Pulses: Normal pulses. Heart sounds: Normal heart sounds. No murmur heard. No friction rub. No gallop. Pulmonary:      Effort: Pulmonary effort is normal. No respiratory distress. Breath sounds: Normal breath sounds. No stridor. No wheezing or rales. Chest:      Chest wall: No tenderness. Abdominal:      General: Abdomen is flat. Bowel sounds are normal. There is no distension. Palpations: Abdomen is soft. There is no mass. Tenderness: There is no abdominal tenderness. There is no right CVA tenderness, left CVA tenderness, guarding or rebound. Musculoskeletal:         General: No swelling, tenderness or deformity. Normal range of motion. Cervical back: Normal range of motion and neck supple. Lumbar back: No swelling, edema or spasms. Lymphadenopathy:      Cervical: No cervical adenopathy. Skin:     General: Skin is warm and dry. Coloration: Skin is not pale. Findings: No erythema or rash. Neurological:      General: No focal deficit present. Mental Status: She is alert and oriented to person, place, and time. Mental status is at baseline. Cranial Nerves: No cranial nerve deficit. Motor: No abnormal muscle tone. Coordination: Coordination normal.      Deep Tendon Reflexes: Reflexes are normal and symmetric.  Reflexes normal.   Psychiatric:         Behavior: Behavior normal. Thought Content: Thought content normal.         Judgment: Judgment normal.         Assessment & Plan:     1. Type 2 diabetes mellitus with stage 3 chronic kidney disease, without long-term current use of insulin ([facility])  Check current A1c but has history of previously been well controlled  - atorvastatin (LIPITOR) 10 MG tablet; Take 1 tablet by mouth daily  Dispense: 90 tablet; Refill: 0  - CBC Auto Differential; Future  - Hemoglobin A1C; Future    2. Lumbar radiculopathy  Continue Neurontin and home physical therapy/strengthening exercises  - Comprehensive Metabolic Panel; Future    3. Osteopenia, unspecified location  Continue supplementation may consider more aggressive medication but due to chronic kidney disease options are limited    4. Hyperlipidemia, unspecified hyperlipidemia type    - Lipid Panel; Future      Return in about 3 months (around 11/4/2021).     Nolberto Whalen MD

## 2021-08-05 ENCOUNTER — HOSPITAL ENCOUNTER (OUTPATIENT)
Dept: LAB | Age: 66
Discharge: HOME | End: 2021-08-05
Payer: MEDICARE

## 2021-08-05 DIAGNOSIS — E78.5 HYPERLIPIDEMIA, UNSPECIFIED HYPERLIPIDEMIA TYPE: ICD-10-CM

## 2021-08-05 DIAGNOSIS — E11.22 TYPE 2 DIABETES MELLITUS WITH STAGE 3 CHRONIC KIDNEY DISEASE, WITHOUT LONG-TERM CURRENT USE OF INSULIN (HCC): Chronic | ICD-10-CM

## 2021-08-05 DIAGNOSIS — N18.30 TYPE 2 DIABETES MELLITUS WITH STAGE 3 CHRONIC KIDNEY DISEASE, WITHOUT LONG-TERM CURRENT USE OF INSULIN (HCC): Chronic | ICD-10-CM

## 2021-08-05 DIAGNOSIS — M54.16 LUMBAR RADICULOPATHY: ICD-10-CM

## 2021-08-05 LAB
ALBUMIN SERPL-MCNC: 3.9 G/DL (ref 3.5–4.6)
ALP BLD-CCNC: 85 U/L (ref 40–130)
ALT SERPL-CCNC: 10 U/L (ref 0–33)
ANION GAP SERPL CALCULATED.3IONS-SCNC: 13 MEQ/L (ref 9–15)
AST SERPL-CCNC: 13 U/L (ref 0–35)
BASOPHILS ABSOLUTE: 0 K/UL (ref 0–0.2)
BASOPHILS RELATIVE PERCENT: 0.8 %
BILIRUB SERPL-MCNC: 0.6 MG/DL (ref 0.2–0.7)
BUN BLDV-MCNC: 20 MG/DL (ref 8–23)
CALCIUM SERPL-MCNC: 9.2 MG/DL (ref 8.5–9.9)
CHLORIDE BLD-SCNC: 105 MEQ/L (ref 95–107)
CHOLESTEROL, TOTAL: 164 MG/DL (ref 0–199)
CO2: 25 MEQ/L (ref 20–31)
CREAT SERPL-MCNC: 1.41 MG/DL (ref 0.5–0.9)
EOSINOPHILS ABSOLUTE: 0.2 K/UL (ref 0–0.7)
EOSINOPHILS RELATIVE PERCENT: 3.5 %
GFR AFRICAN AMERICAN: 45.2
GFR NON-AFRICAN AMERICAN: 37.3
GLOBULIN: 2.7 G/DL (ref 2.3–3.5)
GLUCOSE BLD-MCNC: 147 MG/DL (ref 70–99)
HBA1C MFR BLD: 6.2 % (ref 4.8–5.9)
HCT VFR BLD CALC: 38.2 % (ref 37–47)
HDLC SERPL-MCNC: 67 MG/DL (ref 40–59)
HEMOGLOBIN: 12.5 G/DL (ref 12–16)
LDL CHOLESTEROL CALCULATED: 78 MG/DL (ref 0–129)
LYMPHOCYTES ABSOLUTE: 1.4 K/UL (ref 1–4.8)
LYMPHOCYTES RELATIVE PERCENT: 23.9 %
MCH RBC QN AUTO: 31.3 PG (ref 27–31.3)
MCHC RBC AUTO-ENTMCNC: 32.8 % (ref 33–37)
MCV RBC AUTO: 95.7 FL (ref 82–100)
MONOCYTES ABSOLUTE: 0.5 K/UL (ref 0.2–0.8)
MONOCYTES RELATIVE PERCENT: 8.3 %
NEUTROPHILS ABSOLUTE: 3.7 K/UL (ref 1.4–6.5)
NEUTROPHILS RELATIVE PERCENT: 63.5 %
PDW BLD-RTO: 13.6 % (ref 11.5–14.5)
PLATELET # BLD: 276 K/UL (ref 130–400)
POTASSIUM SERPL-SCNC: 4.2 MEQ/L (ref 3.4–4.9)
RBC # BLD: 4 M/UL (ref 4.2–5.4)
SODIUM BLD-SCNC: 143 MEQ/L (ref 135–144)
TOTAL PROTEIN: 6.6 G/DL (ref 6.3–8)
TRIGL SERPL-MCNC: 95 MG/DL (ref 0–150)
WBC # BLD: 5.8 K/UL (ref 4.8–10.8)

## 2021-08-05 PROCEDURE — 36415 COLL VENOUS BLD VENIPUNCTURE: CPT

## 2021-08-05 PROCEDURE — 83036 HEMOGLOBIN GLYCOSYLATED A1C: CPT

## 2021-08-05 PROCEDURE — 80053 COMPREHEN METABOLIC PANEL: CPT

## 2021-08-05 PROCEDURE — 80061 LIPID PANEL: CPT

## 2021-08-05 PROCEDURE — 85025 COMPLETE CBC W/AUTO DIFF WBC: CPT

## 2021-08-06 ASSESSMENT — ENCOUNTER SYMPTOMS
APNEA: 0
WHEEZING: 0
ABDOMINAL PAIN: 0
CONSTIPATION: 0
SHORTNESS OF BREATH: 0
BACK PAIN: 1
COUGH: 0
DIARRHEA: 0
VOMITING: 0
CHEST TIGHTNESS: 0

## 2021-08-17 DIAGNOSIS — E11.22 TYPE 2 DIABETES MELLITUS WITH STAGE 3 CHRONIC KIDNEY DISEASE, WITHOUT LONG-TERM CURRENT USE OF INSULIN (HCC): Chronic | ICD-10-CM

## 2021-08-17 DIAGNOSIS — N18.30 TYPE 2 DIABETES MELLITUS WITH STAGE 3 CHRONIC KIDNEY DISEASE, WITHOUT LONG-TERM CURRENT USE OF INSULIN (HCC): Chronic | ICD-10-CM

## 2021-08-18 RX ORDER — GLIPIZIDE 2.5 MG/1
TABLET, EXTENDED RELEASE ORAL
Qty: 90 TABLET | Refills: 0 | Status: SHIPPED | OUTPATIENT
Start: 2021-08-18 | End: 2021-11-10

## 2021-08-18 NOTE — TELEPHONE ENCOUNTER
pharmacy requesting medication refill.  Please approve or deny this request.    Rx requested:  Requested Prescriptions     Pending Prescriptions Disp Refills    glipiZIDE (GLUCOTROL XL) 2.5 MG extended release tablet [Pharmacy Med Name: glipiZIDE ER 2.5 MG Oral Tablet Extended Release 24 Hour] 90 tablet 0     Sig: Take 1 tablet by mouth once daily         Last Office Visit:   8/4/2021      Next Visit Date:  Future Appointments   Date Time Provider Dawna Guerreroisti   11/2/2021  9:30 AM Malika Price MD [address]   11/16/2021  9:15 AM Cristina Guerin MD [facility]   5/4/2022  9:00 AM Megan Lopez MD [facility]

## 2021-08-22 DIAGNOSIS — M54.16 LUMBAR RADICULOPATHY: ICD-10-CM

## 2021-08-23 RX ORDER — GABAPENTIN 300 MG/1
CAPSULE ORAL
Qty: 90 CAPSULE | Refills: 1 | Status: SHIPPED | OUTPATIENT
Start: 2021-08-23 | End: 2021-10-20

## 2021-08-24 DIAGNOSIS — I10 ESSENTIAL HYPERTENSION: ICD-10-CM

## 2021-08-24 RX ORDER — AMLODIPINE BESYLATE 10 MG/1
TABLET ORAL
Qty: 90 TABLET | Refills: 0 | Status: SHIPPED | OUTPATIENT
Start: 2021-08-24 | End: 2021-11-23

## 2021-08-30 ENCOUNTER — HOSPITAL ENCOUNTER (OUTPATIENT)
Dept: WOMENS IMAGING | Age: 66
Discharge: HOME | End: 2021-09-01
Payer: MEDICARE

## 2021-08-30 DIAGNOSIS — Z12.31 BREAST CANCER SCREENING BY MAMMOGRAM: ICD-10-CM

## 2021-08-30 PROCEDURE — 77063 BREAST TOMOSYNTHESIS BI: CPT

## 2021-08-31 DIAGNOSIS — E11.9 TYPE 2 DIABETES MELLITUS WITHOUT COMPLICATION, WITHOUT LONG-TERM CURRENT USE OF INSULIN (HCC): ICD-10-CM

## 2021-08-31 NOTE — TELEPHONE ENCOUNTER
Pharmacy  requesting medication refill.  Please approve or deny this request.    Rx requested:  Requested Prescriptions     Pending Prescriptions Disp Refills    ONETOUCH ULTRA strip [Pharmacy Med Name: OneTouch Ultra Blue In Vitro Strip] 100 each 0     Sig: USE 1 STRIP TO CHECK GLUCOSE THREE TIMES DAILY    Lancets ([address] [place]) [address] [facility] [Pharmacy Med Name: Sherren Peabody LAN 11F MIS] 380 each 0     Sig: USE 1  TO [address] [facility] [place] Office Visit:   Visit date not found      Next Visit Date:  Future Appointments   Date Time Provider Department Center   11/2/2021  9:30 AM Griffin Turner MD [address]   11/16/2021  9:15 AM Rian Lord MD [facility]   5/4/2022  9:00 AM [address], MD [address]

## 2021-09-02 RX ORDER — LANCETS 33 GAUGE
EACH MISCELLANEOUS
Qty: 100 EACH | Refills: 0 | Status: SHIPPED | OUTPATIENT
Start: 2021-09-02 | End: 2021-10-08

## 2021-09-02 RX ORDER — BLOOD SUGAR DIAGNOSTIC
STRIP MISCELLANEOUS
Qty: 100 EACH | Refills: 0 | Status: SHIPPED | OUTPATIENT
Start: 2021-09-02 | End: 2021-10-08

## 2021-10-07 DIAGNOSIS — E11.9 TYPE 2 DIABETES MELLITUS WITHOUT COMPLICATION, WITHOUT LONG-TERM CURRENT USE OF INSULIN (HCC): ICD-10-CM

## 2021-10-07 NOTE — TELEPHONE ENCOUNTER
Requesting medication refill.  Please approve or deny this request.    Rx requested:  Requested Prescriptions     Pending Prescriptions Disp Refills    Lancets (Tanya De Jesus) [address] [facility] [Pharmacy Med Name: Avtar MIX 04F MIS] 719 each 0     Sig: USE 1  TO CHECK GLUCOSE THREE TIMES DAILY    [address] [facility] [Pharmacy Med Name: OneTouch Ultra Blue In Vitro Strip] 100 each 0     Sig: USE 1 STRIP TO CHECK GLUCOSE THREE TIMES DAILY       Last Office Visit:   8/4/2021    Last Filled:      Last Labs:      Next Visit Date:  Future Appointments   Date Time Provider Dawna Biggs   11/2/2021  9:30 AM Abbi Valdez MD [facility] DAVID Silverman   11/16/2021  9:15 AM Bhumika Walter MD [facility]   5/4/2022  9:00 AM [facility] [address] Neeta 94

## 2021-10-08 RX ORDER — BLOOD SUGAR DIAGNOSTIC
STRIP MISCELLANEOUS
Qty: 100 EACH | Refills: 0 | Status: SHIPPED | OUTPATIENT
Start: 2021-10-08 | End: 2022-01-13

## 2021-10-08 RX ORDER — LANCETS 33 GAUGE
EACH MISCELLANEOUS
Qty: 100 EACH | Refills: 0 | Status: SHIPPED | OUTPATIENT
Start: 2021-10-08 | End: 2022-01-13

## 2021-10-20 DIAGNOSIS — M54.16 LUMBAR RADICULOPATHY: ICD-10-CM

## 2021-10-20 RX ORDER — GABAPENTIN 300 MG/1
CAPSULE ORAL
Qty: 90 CAPSULE | Refills: 1 | Status: SHIPPED | OUTPATIENT
Start: 2021-10-20 | End: 2021-12-22

## 2021-10-20 NOTE — TELEPHONE ENCOUNTER
Requesting medication refill.  Please approve or deny this request.    Rx requested:  Requested Prescriptions     Pending Prescriptions Disp Refills    gabapentin (NEURONTIN) 300 MG capsule [Pharmacy Med Name: Gabapentin 300 MG Oral Capsule] 90 capsule 0     Sig: TAKE 1 CAPSULE BY MOUTH THREE TIMES DAILY       Last Office Visit:   8/4/2021    Last Filled:      Last Labs:      Next Visit Date:  Future Appointments   Date Time Provider Dawna Biggs   11/2/2021  9:30 AM Safia Caban MD [address]   11/16/2021  9:15 AM Katy Singleton MD [facility]   5/4/2022  9:00 AM [address] [place] 94

## 2021-11-02 ENCOUNTER — OFFICE VISIT (OUTPATIENT)
Dept: CARDIOLOGY CLINIC | Age: 66
End: 2021-11-02
Payer: MEDICARE

## 2021-11-02 VITALS
OXYGEN SATURATION: 98 % | BODY MASS INDEX: 33.59 KG/M2 | HEIGHT: 67 IN | SYSTOLIC BLOOD PRESSURE: 120 MMHG | HEART RATE: 72 BPM | WEIGHT: 214 LBS | DIASTOLIC BLOOD PRESSURE: 76 MMHG

## 2021-11-02 DIAGNOSIS — R00.2 PALPITATION: ICD-10-CM

## 2021-11-02 DIAGNOSIS — N18.31 TYPE 2 DIABETES MELLITUS WITH STAGE 3A CHRONIC KIDNEY DISEASE, WITHOUT LONG-TERM CURRENT USE OF INSULIN (HCC): ICD-10-CM

## 2021-11-02 DIAGNOSIS — I10 ESSENTIAL HYPERTENSION: Primary | ICD-10-CM

## 2021-11-02 DIAGNOSIS — E11.22 TYPE 2 DIABETES MELLITUS WITH STAGE 3A CHRONIC KIDNEY DISEASE, WITHOUT LONG-TERM CURRENT USE OF INSULIN (HCC): ICD-10-CM

## 2021-11-02 PROCEDURE — 99213 OFFICE O/P EST LOW 20 MIN: CPT | Performed by: INTERNAL MEDICINE

## 2021-11-02 RX ORDER — METOPROLOL SUCCINATE 25 MG/1
25 TABLET, EXTENDED RELEASE ORAL DAILY
Qty: 30 TABLET | Refills: 5 | Status: SHIPPED | OUTPATIENT
Start: 2021-11-02 | End: 2022-02-11 | Stop reason: SDUPTHER

## 2021-11-02 ASSESSMENT — ENCOUNTER SYMPTOMS
CHEST TIGHTNESS: 0
SHORTNESS OF BREATH: 0

## 2021-11-02 NOTE — PROGRESS NOTES
[facility] CARDIOLOGY OFFICE FOLLOW-UP      Patient: Varsha Lechuga  YOB: 1955  MRN: [id number]    Chief Complaint:  Chief Complaint   Patient presents with    6 Month Follow-Up    Hypertension         Subjective/HPI:  11/2/21: Patient presents today for palpitation. She is [place] mother in law. Has had palpitation. she thinks it could be stress related. No chest pain no congestive heart failure no syncope no dizziness. See me in 3 months      5/18/21: Patient presents today for follow-up of echo. Unremarkable. She is [facility] mother-in-law. No chest pain no congestive heart failure no syncope. She will see me in 6 months        4/27/21: Patient presents today for follow-up of hypertension but has not seen me in 2 years. Jennifer Varela here from [place] 10 years ago. Lives with  who is disabled. Tita Baltazar is related to Otoniel Espitia who is our echo supervisor at [facility] in [address] any chest pain or congestive heart failure symptoms.  Has dyslipidemia.  Last echo showed 2+ mitral regurgitation.  Had a nuclear stress test that was normal. I will repeat the echocardiogram at [facility] and see me           9/25/18: Patient presents today for HTN. Norvasc increased to 10. BP better. Echo shows 1+MR. Pimentel is normal.See in 1 year     8/21/18: Patient presents today for evaluation of chest pain. Consulted by Dr. Alis Duggan. Very pleasant 80-year-old white female who lives at home with her  who is disabled. She moved in from [place] in 10 years ago. Does not smoke. Chest pain is lower sternal area. No definite relation to exertion change of posture breathing. No nausea no vomiting. Occasionally may go to the jaw. EKG shows no acute changes. Brother had bypass and valve surgery. Has a history of hypertension which is well-controlled.  We will schedule for Lexiscan and echo and then see me         Past Medical History:   Diagnosis Date    Allergic rhinitis     Asthma     Chest pain, unspecified 8/20/2018    Endometrial cancer, grade I ([address] 2011    [facility]    Hypertension     Kidney problem     blockage in urethra and urine backs up has to get urethra dilated, has scar tissue    Type 2 diabetes mellitus with chronic kidney disease, without long-term current use of insulin ([address] 6/21/2019       Past Surgical History:   Procedure Laterality Date    CATARACT REMOVAL Left 11/2020    CHOLECYSTECTOMY  1990    HYSTERECTOMY  2011    endometrial cancer stage !a    URETHRAL STRICTURE DILATATION  2016    Dr. Steven Scruggs        Family History   Problem Relation Age of Onset    Rheum Arthritis Mother     Osteoporosis Mother     No Known Problems Father         complications afte gallbladder surgery    Osteoarthritis Sister     Coronary Art Dis Brother     Heart Surgery Brother     Osteoarthritis Sister        Social History     Socioeconomic History    Marital status:      Spouse name: None    Number of children: None    Years of education: None    Highest education level: None   Occupational History    None   Tobacco Use    Smoking status: Never Smoker    Smokeless tobacco: Never Used   Substance and Sexual Activity    Alcohol use: No    Drug use: No    Sexual activity: None   Other Topics Concern    None   Social History Narrative    None     Social Determinants of Health     Financial Resource Strain: Low Risk     Difficulty of Paying Living Expenses: Not hard at all   Food Insecurity: No Food Insecurity    Worried About Running Out of Food in the Last Year: Never true    Daly of Food in the Last Year: Never true   Transportation Needs: No Transportation Needs    Lack of Transportation (Medical): No    Lack of Transportation (Non-Medical):  No   Physical Activity:     Days of Exercise per Week:     Minutes of Exercise per Session:    Stress:     Feeling of Stress :    Social Connections:     Frequency of Communication with Friends and Family:     Frequency of Social Gatherings with Friends and Family:     Attends Oriental orthodox Services:     Active Member of Clubs or Organizations:     Attends Club or Organization Meetings:     Marital Status:    Intimate Partner Violence:     Fear of Current or Ex-Partner:     Emotionally Abused:     Physically Abused:     Sexually Abused: Allergies   Allergen Reactions    Ciprofloxacin        Current Outpatient Medications   Medication Sig Dispense Refill    metoprolol succinate (TOPROL XL) 25 MG extended release tablet Take 1 tablet by mouth daily 30 tablet 5    gabapentin (NEURONTIN) 300 MG capsule TAKE 1 CAPSULE BY MOUTH THREE TIMES DAILY 90 capsule 1    Lancets (ONETOUCH DELICA PLUS [id number]) MISC USE 1  TO CHECK GLUCOSE THREE TIMES DAILY 100 each 0    ONETOUCH ULTRA strip USE 1 STRIP TO CHECK GLUCOSE THREE TIMES DAILY 100 each 0    amLODIPine (NORVASC) 10 MG tablet Take 1 tablet by mouth once daily 90 tablet 0    glipiZIDE (GLUCOTROL XL) 2.5 MG extended release tablet Take 1 tablet by mouth once daily 90 tablet 0    atorvastatin (LIPITOR) 10 MG tablet Take 1 tablet by mouth daily 90 tablet 0    Calcium Carb-Cholecalciferol (CALCIUM 500 + D) 500-400 MG-UNIT TABS Take 1 tablet by mouth 2 times daily 60 tablet 1    tamsulosin (FLOMAX) 0.4 MG capsule Take 1 capsule by mouth daily 90 capsule 3    nystatin (MYCOSTATIN) 515801 UNIT/GM cream Apply topically 2 times daily. 1 Tube 1    blood glucose monitor kit and supplies DX: diabetes mellitus. Test 3 time(s) daily - Ok to substitute per insurance 1 kit 1    VITAMIN D PO Take by mouth      Cyanocobalamin (VITAMIN B-12 PO) Take by mouth      APPLE CIDER VINEGAR PO Take by mouth       CRANBERRY PO Take by mouth      Alcohol Swabs PADS DX: diabetes mellitus.  Test 1-3 time(s) daily - Ok to substitute per insurance (1 each = 1 box) 1 each 5    aspirin 81 MG tablet Take 81 mg by mouth daily       No current facility-administered medications for this Patient Active Problem List   Diagnosis    Urethral stricture    Retention of urine    Renal insufficiency syndrome    Renal failure syndrome    Renal cyst    Pancreas cyst    Osteoarthrosis involving lower leg    Essential hypertension    Asthma    Allergic rhinitis    Chest pain, unspecified    Type 2 diabetes mellitus with chronic kidney disease, without long-term current use of insulin ([facility]    Arteriosclerosis of coronary artery    Stage 4 chronic kidney disease ([facility]           No orders of the defined types were placed in this encounter. Orders Placed This Encounter   Medications    metoprolol succinate (TOPROL XL) 25 MG extended release tablet     Sig: Take 1 tablet by mouth daily     Dispense:  30 tablet     Refill:  5             Assessment/Orders:       ICD-10-CM    1. Essential hypertension  I10    2. Murmur, cardiac  R01.1        Orders Placed This Encounter   Medications    metoprolol succinate (TOPROL XL) 25 MG extended release tablet     Sig: Take 1 tablet by mouth daily     Dispense:  30 tablet     Refill:  5       There are no discontinued medications. No orders of the defined types were placed in this encounter. Plan:    Stay on same medications.     See me in 3 months        Electronically signed by: Brooke Zavala MD  11/3/2021 10:29 AM

## 2021-11-03 ASSESSMENT — ENCOUNTER SYMPTOMS
NAUSEA: 0
BLOOD IN STOOL: 0
VOMITING: 0

## 2021-11-03 NOTE — TELEPHONE ENCOUNTER
Requesting medication refill.  Please approve or deny this request.    Rx requested:  Requested Prescriptions     Pending Prescriptions Disp Refills    atorvastatin (LIPITOR) 10 MG tablet [Pharmacy Med Name: Atorvastatin Calcium 10 MG Oral Tablet] 90 tablet 0     Sig: Take 1 tablet by mouth once daily       Last Office Visit:   8/4/2021    Last Filled:      Last Labs:      Next Visit Date:  Future Appointments   Date Time Provider Dawna Biggs   11/16/2021  9:15 AM Miles Trevizo MD [facility]   2/1/2022  9:15 AM Francie Law MD [address]   5/4/2022  9:00 AM Bi Esparza MD [facility] 94

## 2021-11-04 RX ORDER — ATORVASTATIN CALCIUM 10 MG/1
TABLET, FILM COATED ORAL
Qty: 90 TABLET | Refills: 0 | Status: SHIPPED | OUTPATIENT
Start: 2021-11-04 | End: 2022-01-31

## 2021-11-10 DIAGNOSIS — N18.30 TYPE 2 DIABETES MELLITUS WITH STAGE 3 CHRONIC KIDNEY DISEASE, WITHOUT LONG-TERM CURRENT USE OF INSULIN (HCC): Chronic | ICD-10-CM

## 2021-11-10 DIAGNOSIS — E11.22 TYPE 2 DIABETES MELLITUS WITH STAGE 3 CHRONIC KIDNEY DISEASE, WITHOUT LONG-TERM CURRENT USE OF INSULIN (HCC): Chronic | ICD-10-CM

## 2021-11-10 RX ORDER — GLIPIZIDE 2.5 MG/1
TABLET, EXTENDED RELEASE ORAL
Qty: 90 TABLET | Refills: 0 | Status: SHIPPED | OUTPATIENT
Start: 2021-11-10 | End: 2022-01-31

## 2021-11-10 NOTE — TELEPHONE ENCOUNTER
Requesting medication refill.  Please approve or deny this request.    Rx requested:  Requested Prescriptions     Pending Prescriptions Disp Refills    glipiZIDE (GLUCOTROL XL) 2.5 MG extended release tablet [Pharmacy Med Name: glipiZIDE ER 2.5 MG Oral Tablet Extended Release 24 Hour] 90 tablet 0     Sig: Take 1 tablet by mouth once daily       Last Office Visit:   8/4/2021    Last Filled:      Last Labs:      Next Visit Date:  Future Appointments   Date Time Provider Dawna Guerreroisti   11/16/2021  9:15 AM Dejon Maria MD [facility]   2/1/2022  9:15 AM Marcelo Hewitt MD [address]   5/4/2022  9:00 AM Bi Blanchard MD [facility] 94

## 2021-11-16 ENCOUNTER — OFFICE VISIT (OUTPATIENT)
Dept: UROLOGY | Age: 66
End: 2021-11-16
Payer: MEDICARE

## 2021-11-16 VITALS
DIASTOLIC BLOOD PRESSURE: 62 MMHG | OXYGEN SATURATION: 98 % | SYSTOLIC BLOOD PRESSURE: 128 MMHG | BODY MASS INDEX: 32.18 KG/M2 | HEART RATE: 51 BPM | WEIGHT: 205 LBS | HEIGHT: 67 IN

## 2021-11-16 DIAGNOSIS — N31.9 NEUROGENIC BLADDER: Primary | ICD-10-CM

## 2021-11-16 DIAGNOSIS — R33.9 RETENTION OF URINE: ICD-10-CM

## 2021-11-16 PROCEDURE — 99213 OFFICE O/P EST LOW 20 MIN: CPT | Performed by: UROLOGY

## 2021-11-16 RX ORDER — TAMSULOSIN HYDROCHLORIDE 0.4 MG/1
0.4 CAPSULE ORAL DAILY
Qty: 90 CAPSULE | Refills: 3 | Status: SHIPPED | OUTPATIENT
Start: 2021-11-16 | End: 2022-01-28

## 2021-11-16 NOTE — PROGRESS NOTES
[facility] UROLOG[facility] EVALUATION NOTE                                                 H&P                                                                                                                                                 Reason for Visit  Neuropathic bladder, bladder outlet obstruction    History of Present Illness  71-year-old female has been measuring her urinary voiding pattern with intermittent catheterization up to 7 times a day  Voids in between with the help of tamsulosin  Denies hematuria or UTIs  No issues with catheterization  Catheter prescription refilled  Tamsulosin refilled      Urologic Review of Systems/Symptoms  Unchanged    Review of Systems  Hospitalization: None recent  All 14 categories of Review of Systems otherwise reviewed no other findings reported.   No change in review of systems  Past Medical History:   Diagnosis Date    Allergic rhinitis     Asthma     Chest pain, unspecified 8/20/2018    Endometrial cancer, grade I ([facility]    Hypertension     Kidney problem     blockage in urethra and urine backs up has to get urethra dilated, has scar tissue    Type 2 diabetes mellitus with chronic kidney disease, without long-term current use of insulin ([facility] [address] [facility] 6/21/2019     Past Surgical History:   Procedure Laterality Date    CATARACT REMOVAL Left 11/2020    CHOLECYSTECTOMY  1990    HYSTERECTOMY  2011    endometrial cancer stage !a    URETHRAL STRICTURE DILATATION  2016    Dr. Gena Ye History     Socioeconomic History    Marital status:      Spouse name: None    Number of children: None    Years of education: None    Highest education level: None   Occupational History    None   Tobacco Use    Smoking status: Never Smoker    Smokeless tobacco: Never Used   Substance and Sexual Activity    Alcohol use: No    Drug use: No    Sexual activity: None   Other Topics Concern    None   Social History Narrative    None     Social Please note this report has been partially produced using speech recognition software  And may cause contain errors related to that system including grammar, punctuation and spelling as well as words and phrases that may seem inappropriate. If there are questions or concerns please feel free to contact me to clarify.

## 2021-11-23 DIAGNOSIS — I10 ESSENTIAL HYPERTENSION: ICD-10-CM

## 2021-11-23 RX ORDER — AMLODIPINE BESYLATE 10 MG/1
TABLET ORAL
Qty: 90 TABLET | Refills: 0 | Status: SHIPPED | OUTPATIENT
Start: 2021-11-23 | End: 2022-02-11 | Stop reason: SDUPTHER

## 2021-11-23 NOTE — TELEPHONE ENCOUNTER
pharmacy requesting medication refill.  Please approve or deny this request.    Rx requested:  Requested Prescriptions     Pending Prescriptions Disp Refills    amLODIPine (NORVASC) 10 MG tablet [Pharmacy Med Name: amLODIPine Besylate 10 MG Oral Tablet] 90 tablet 0     Sig: Take 1 tablet by mouth once daily         Last Office Visit:   8/4/2021      Next Visit Date:  Future Appointments   Date Time Provider Dawna Biggs   2/1/2022  9:15 AM Weston Gallegos MD [address]   5/4/2022  9:00 MD Neeta Covington [place] [address]   11/15/2022  9:15 AM [facility]

## 2021-12-15 ENCOUNTER — HOSPITAL ENCOUNTER (OUTPATIENT)
Dept: LAB | Age: 66
Discharge: HOME | End: 2021-12-15
Payer: MEDICARE

## 2021-12-15 LAB
ALBUMIN SERPL-MCNC: 4 G/DL (ref 3.5–4.6)
ANION GAP SERPL CALCULATED.3IONS-SCNC: 11 MEQ/L (ref 9–15)
BACTERIA: ABNORMAL /HPF
BILIRUBIN URINE: NEGATIVE
BLOOD, URINE: ABNORMAL
BUN BLDV-MCNC: 26 MG/DL (ref 8–23)
CALCIUM SERPL-MCNC: 9.2 MG/DL (ref 8.5–9.9)
CHLORIDE BLD-SCNC: 107 MEQ/L (ref 95–107)
CLARITY: ABNORMAL
CO2: 24 MEQ/L (ref 20–31)
COLOR: YELLOW
CREAT SERPL-MCNC: 1.5 MG/DL (ref 0.5–0.9)
CREATININE URINE: 38.8 MG/DL
EPITHELIAL CELLS, UA: ABNORMAL /HPF (ref 0–5)
GFR AFRICAN AMERICAN: 42
GFR NON-AFRICAN AMERICAN: 34.7
GLUCOSE BLD-MCNC: 148 MG/DL (ref 70–99)
GLUCOSE URINE: NEGATIVE MG/DL
HYALINE CASTS: ABNORMAL /HPF (ref 0–5)
KETONES, URINE: NEGATIVE MG/DL
LEUKOCYTE ESTERASE, URINE: ABNORMAL
NITRITE, URINE: NEGATIVE
PARATHYROID HORMONE INTACT: 63.4 PG/ML (ref 15–65)
PH UA: 7 (ref 5–9)
PHOSPHORUS: 4 MG/DL (ref 2.3–4.8)
POTASSIUM SERPL-SCNC: 4.8 MEQ/L (ref 3.4–4.9)
PROTEIN PROTEIN: 21 MG/DL
PROTEIN UA: NEGATIVE MG/DL
PROTEIN/CREAT RATIO: 0.5 ML/ML
PROTEIN/CREAT RATIO: 0.5 ML/ML (ref 0–0.2)
RBC UA: ABNORMAL /HPF (ref 0–5)
SODIUM BLD-SCNC: 142 MEQ/L (ref 135–144)
SPECIFIC GRAVITY UA: 1.01 (ref 1–1.03)
UROBILINOGEN, URINE: 0.2 E.U./DL
WBC UA: >100 /HPF (ref 0–5)
YEAST: PRESENT /HPF

## 2021-12-15 PROCEDURE — 81001 URINALYSIS AUTO W/SCOPE: CPT

## 2021-12-15 PROCEDURE — 36415 COLL VENOUS BLD VENIPUNCTURE: CPT

## 2021-12-15 PROCEDURE — 82306 VITAMIN D 25 HYDROXY: CPT

## 2021-12-15 PROCEDURE — 83970 ASSAY OF PARATHORMONE: CPT

## 2021-12-15 PROCEDURE — 84156 ASSAY OF PROTEIN URINE: CPT

## 2021-12-15 PROCEDURE — 80069 RENAL FUNCTION PANEL: CPT

## 2021-12-16 LAB — VITAMIN D 25-HYDROXY: 46.6 NG/ML (ref 30–100)

## 2022-01-13 DIAGNOSIS — E11.9 TYPE 2 DIABETES MELLITUS WITHOUT COMPLICATION, WITHOUT LONG-TERM CURRENT USE OF INSULIN (HCC): ICD-10-CM

## 2022-01-13 RX ORDER — BLOOD SUGAR DIAGNOSTIC
STRIP MISCELLANEOUS
Qty: 100 EACH | Refills: 0 | Status: SHIPPED | OUTPATIENT
Start: 2022-01-13 | End: 2022-04-13

## 2022-01-13 RX ORDER — LANCETS 33 GAUGE
EACH MISCELLANEOUS
Qty: 100 EACH | Refills: 0 | Status: SHIPPED | OUTPATIENT
Start: 2022-01-13 | End: 2022-04-14

## 2022-01-13 NOTE — TELEPHONE ENCOUNTER
pharmacy requesting medication refill.  Please approve or deny this request.    Rx requested:  Requested Prescriptions     Pending Prescriptions Disp Refills    Lancets (Everton Leach) [address] [facility] [Pharmacy Med Name: Aman MIX 93T MIS] 246 each 0     Sig: USE 1  TO CHECK GLUCOSE THREE TIMES DAILY    [address] strip [Pharmacy Med Name: OneTouch Ultra Blue In Vitro Strip] 100 each 0     Sig: USE 1 STRIP  [address]         Last Office Visit:   8/4/2021      Next Visit Date:  Future Appointments   Date Time Provider Department Center   2/1/2022  9:15 AM Aroldo Santiago MD [address]   5/4/2022  9:00 AM Mallika Scanlon MD [facility]   11/15/2022  9:15 AM Axel Pond MD [facility]

## 2022-01-25 ENCOUNTER — TELEPHONE (OUTPATIENT)
Dept: FAMILY MEDICINE CLINIC | Age: 67
End: 2022-01-25

## 2022-01-25 NOTE — TELEPHONE ENCOUNTER
----- Message from Rebekah Franklin MA sent at 1/25/2022  8:39 AM EST -----  Subject: Refill Request    QUESTIONS  Name of Medication? gabapentin (NEURONTIN) 300 MG capsule  Patient-reported dosage and instructions? 300 MG, TID   How many days do you have left? 1  Preferred Pharmacy? [address]   Pharmacy phone number (if available)? [phone]  Additional Information for Provider? [facility] called 1/21/22.  ---------------------------------------------------------------------------  --------------  CALL BACK INFO  What is the best way for the office to contact you? Do not leave any   message, patient will call back for answer  Preferred Call Back Phone Number?  [phone]

## 2022-01-27 DIAGNOSIS — M54.16 LUMBAR RADICULOPATHY: ICD-10-CM

## 2022-01-27 RX ORDER — GABAPENTIN 300 MG/1
CAPSULE ORAL
Qty: 90 CAPSULE | Refills: 0 | Status: SHIPPED | OUTPATIENT
Start: 2022-01-27 | End: 2022-02-23

## 2022-01-27 NOTE — TELEPHONE ENCOUNTER
Comments:     Last Office Visit (last PCP visit):   8/4/2021    Next Visit Date:  Future Appointments   Date Time Provider Dawna Biggs   1/28/2022  9:20 AM [address], MD [address]   2/1/2022  9:15 AM Sharri Bruno MD [address]   5/4/2022  9:00 AM [address], MD [address]   11/15/2022  9:15 AM Florencia Banerjee MD [facility]       **If hasn't been seen in over a year OR hasn't followed up according to last diabetes/ADHD visit, make appointment for patient before sending refill to provider.     Rx requested:  Requested Prescriptions     Pending Prescriptions Disp Refills    gabapentin (NEURONTIN) 300 MG capsule [Pharmacy Med Name: Gabapentin 300 MG Oral Capsule] 90 capsule 0     Sig: TAKE 1 CAPSULE BY MOUTH THREE TIMES DAILY

## 2022-01-28 ENCOUNTER — OFFICE VISIT (OUTPATIENT)
Dept: FAMILY MEDICINE CLINIC | Age: 67
End: 2022-01-28
Payer: MEDICARE

## 2022-01-28 VITALS
HEART RATE: 81 BPM | DIASTOLIC BLOOD PRESSURE: 72 MMHG | TEMPERATURE: 96.4 F | BODY MASS INDEX: 33.83 KG/M2 | OXYGEN SATURATION: 99 % | WEIGHT: 216 LBS | SYSTOLIC BLOOD PRESSURE: 122 MMHG

## 2022-01-28 DIAGNOSIS — M54.16 LUMBAR RADICULOPATHY: ICD-10-CM

## 2022-01-28 DIAGNOSIS — I10 ESSENTIAL HYPERTENSION: ICD-10-CM

## 2022-01-28 DIAGNOSIS — M85.80 OSTEOPENIA, UNSPECIFIED LOCATION: Primary | ICD-10-CM

## 2022-01-28 PROCEDURE — 99214 OFFICE O/P EST MOD 30 MIN: CPT | Performed by: FAMILY MEDICINE

## 2022-01-28 ASSESSMENT — ENCOUNTER SYMPTOMS
SHORTNESS OF BREATH: 0
CHEST TIGHTNESS: 0
BACK PAIN: 1
CONSTIPATION: 0
COUGH: 0
ABDOMINAL PAIN: 0
APNEA: 0
DIARRHEA: 0
WHEEZING: 0
VOMITING: 0

## 2022-01-28 NOTE — PROGRESS NOTES
Subjective:      Patient ID: Antonio Damon is a 77 y.o. female who presents today for:     Chief Complaint   Patient presents with    Medication Refill       HPI  Patient is a very pleasant 28-year-old female presents today to follow-up on lower back pain and leg pain. She was evaluated by neurosurgery but elected not to have any further intervention. She states that the current dose of gabapentin is adequate in controlling her discomfort and she would like to continue taking it. She denies any negative side effects. She denies any new numbness or tingling. Hypertension: Patient is adherent to her medication.   She denies any chest discomfort, shortness of breath or lower extremity edema  Osteopenia: Patient is adherent to her vitamin D supplementation but states that her calcium supplementation caused her bones to ache and therefore discontinued it  Past Medical History:   Diagnosis Date    Allergic rhinitis     Asthma     Chest pain, unspecified 8/20/2018    Endometrial cancer, grade I ([address] 2011    [facility]    Hypertension     Kidney problem     blockage in urethra and urine backs up has to get urethra dilated, has scar tissue    Type 2 diabetes mellitus with chronic kidney disease, without long-term current use of insulin ([address] 6/21/2019     Past Surgical History:   Procedure Laterality Date    CATARACT REMOVAL Left 11/2020    CHOLECYSTECTOMY  1990    HYSTERECTOMY  2011    endometrial cancer stage !a    URETHRAL STRICTURE DILATATION  2016    Dr. James Figueroa      Family History   Problem Relation Age of Onset    Rheum Arthritis Mother     Osteoporosis Mother     No Known Problems Father         complications afte gallbladder surgery    Osteoarthritis Sister     Coronary Art Dis Brother     Heart Surgery Brother     Osteoarthritis Sister      Social History     Socioeconomic History    Marital status:      Spouse name: Not on file    Number of children: Not on file   [facility] Years of education: Not on file    Highest education level: Not on file   Occupational History    Not on file   Tobacco Use    Smoking status: Never Smoker    Smokeless tobacco: Never Used   Substance and Sexual Activity    Alcohol use: No    Drug use: No    Sexual activity: Not on file   Other Topics Concern    Not on file   Social History Narrative    Not on file     Social Determinants of Health     Financial Resource Strain: Low Risk     Difficulty of Paying Living Expenses: Not hard at all   Food Insecurity: No Food Insecurity    Worried About [address] [place] [address] in the Last Year: Never true    Daly of Food in the Last Year: Never true   Transportation Needs: No Transportation Needs    Lack of Transportation (Medical): No    Lack of Transportation (Non-Medical):  No   Physical Activity:     Days of Exercise per Week: Not on file    Minutes of Exercise per Session: Not on file   Stress:     Feeling of Stress : Not on file   Social Connections:     Frequency of Communication with Friends and Family: Not on file    Frequency of Social Gatherings with Friends and Family: Not on file    Attends Methodist Services: Not on file    Active Member of [address] [place] [address] or Organizations: Not on file    Attends Club or Organization Meetings: Not on file    Marital Status: Not on file   Intimate Partner Violence:     Fear of Current or Ex-Partner: Not on file    Emotionally Abused: Not on file    Physically Abused: Not on file    Sexually Abused: Not on file   Housing Stability:     Unable to Pay for Housing in the Last Year: Not on file    Number of [place] in the Last Year: Not on file    Unstable Housing in the Last Year: Not on file     Current Outpatient Medications on File Prior to Visit   Medication Sig Dispense Refill    gabapentin (NEURONTIN) 300 MG capsule TAKE 1 CAPSULE BY MOUTH THREE TIMES DAILY 90 capsule 0    Lancets (ONETOUCH DELICA PLUS [id number]) MISC USE 1  TO CHECK GLUCOSE THREE TIMES DAILY 100 each 0    ONETOUCH ULTRA strip USE 1 STRIP TO CHECK GLUCOSE THREE TIMES DAILY 100 each 0    amLODIPine (NORVASC) 10 MG tablet Take 1 tablet by mouth once daily 90 tablet 0    glipiZIDE (GLUCOTROL XL) 2.5 MG extended release tablet Take 1 tablet by mouth once daily 90 tablet 0    atorvastatin (LIPITOR) 10 MG tablet Take 1 tablet by mouth once daily 90 tablet 0    metoprolol succinate (TOPROL XL) 25 MG extended release tablet Take 1 tablet by mouth daily 30 tablet 5    tamsulosin (FLOMAX) 0.4 MG capsule Take 1 capsule by mouth daily 90 capsule 3    nystatin (MYCOSTATIN) 485064 UNIT/GM cream Apply topically 2 times daily. 1 Tube 1    blood glucose monitor kit and supplies DX: diabetes mellitus. Test 3 time(s) daily - Ok to substitute per insurance 1 kit 1    VITAMIN D PO Take by mouth      CRANBERRY PO Take by mouth      Alcohol Swabs PADS DX: diabetes mellitus. Test 1-3 time(s) daily - Ok to substitute per insurance (1 each = 1 box) 1 each 5    aspirin 81 MG tablet Take 81 mg by mouth daily       No current facility-administered medications on file prior to visit. Allergies:  Ciprofloxacin    Review of Systems   Constitutional: Positive for activity change. Negative for appetite change, fatigue and unexpected weight change. Respiratory: Negative for apnea, cough, chest tightness, shortness of breath and wheezing. Cardiovascular: Negative for chest pain, palpitations and leg swelling. Gastrointestinal: Negative for abdominal pain, constipation, diarrhea and vomiting. Musculoskeletal: Positive for arthralgias, back pain and gait problem. Neurological: Negative for seizures, weakness and numbness. Psychiatric/Behavioral: Negative for agitation, hallucinations and suicidal ideas. Objective:   /72   Pulse 81   Temp 96.4 °F (35.8 °C) (Infrared)   Wt 216 lb (98 kg)   LMP  (LMP Unknown)   SpO2 99%   BMI 33.83 kg/m²     Physical Exam  Vitals reviewed. Constitutional:       Appearance: She is well-developed. HENT:      Head: Normocephalic and atraumatic. Neck:      Vascular: No JVD. Cardiovascular:      Rate and Rhythm: Normal rate and regular rhythm. Heart sounds: Normal heart sounds. Pulmonary:      Effort: Pulmonary effort is normal. No respiratory distress. Breath sounds: Normal breath sounds. Abdominal:      General: Bowel sounds are normal.      Palpations: Abdomen is soft. Musculoskeletal:      Cervical back: Normal range of motion and neck supple. Lumbar back: Tenderness present. No swelling, edema or spasms. Decreased range of motion. Lymphadenopathy:      Cervical: No cervical adenopathy. Skin:     General: Skin is warm and dry. Neurological:      Mental Status: She is alert and oriented to person, place, and time. Cranial Nerves: No cranial nerve deficit. Coordination: Coordination normal.      Deep Tendon Reflexes: Reflexes are normal and symmetric. Psychiatric:         Behavior: Behavior normal.         Thought Content: Thought content normal.         Judgment: Judgment normal.          Assessment & Plan:     1. Lumbar radiculopathy  Continue gabapentin    2. Osteopenia, unspecified location  Continue calcium and vitamin D supplementation    3. Essential hypertension  Controlled: Continue current medication      Return in about 3 months (around 4/28/2022).     Mina Lockhart MD

## 2022-01-30 DIAGNOSIS — N18.31 TYPE 2 DIABETES MELLITUS WITH STAGE 3A CHRONIC KIDNEY DISEASE, WITHOUT LONG-TERM CURRENT USE OF INSULIN (HCC): ICD-10-CM

## 2022-01-30 DIAGNOSIS — N18.30 TYPE 2 DIABETES MELLITUS WITH STAGE 3 CHRONIC KIDNEY DISEASE, WITHOUT LONG-TERM CURRENT USE OF INSULIN (HCC): Chronic | ICD-10-CM

## 2022-01-30 DIAGNOSIS — E11.22 TYPE 2 DIABETES MELLITUS WITH STAGE 3A CHRONIC KIDNEY DISEASE, WITHOUT LONG-TERM CURRENT USE OF INSULIN (HCC): ICD-10-CM

## 2022-01-30 DIAGNOSIS — E11.22 TYPE 2 DIABETES MELLITUS WITH STAGE 3 CHRONIC KIDNEY DISEASE, WITHOUT LONG-TERM CURRENT USE OF INSULIN (HCC): Chronic | ICD-10-CM

## 2022-01-31 NOTE — TELEPHONE ENCOUNTER
Pharmacy is requesting medication refill.  Please approve or deny this request.    Rx requested:  Requested Prescriptions     Pending Prescriptions Disp Refills    glipiZIDE (GLUCOTROL XL) 2.5 MG extended release tablet [Pharmacy Med Name: glipiZIDE ER 2.5 MG Oral Tablet Extended Release 24 Hour] 90 tablet 0     Sig: Take 1 tablet by mouth daily    atorvastatin (LIPITOR) 10 MG tablet [Pharmacy Med Name: Atorvastatin Calcium 10 MG Oral Tablet] 90 tablet 0     Sig: Take 1 tablet by mouth daily         Last Office Visit:   1/28/2022      Next Visit Date:  Future Appointments   Date Time Provider Dawna Biggs   2/11/2022  1:00 PM Reg Malloy MD [address] [place] [address]   5/4/2022  9:00 AM [id number] [place] [address] [address], [place] [address]   11/15/2022  9:15 AM Ivette Nettles MD [facility]

## 2022-02-01 RX ORDER — ATORVASTATIN CALCIUM 10 MG/1
10 TABLET, FILM COATED ORAL DAILY
Qty: 90 TABLET | Refills: 0 | Status: SHIPPED | OUTPATIENT
Start: 2022-02-01 | End: 2022-02-11 | Stop reason: SDUPTHER

## 2022-02-01 RX ORDER — GLIPIZIDE 2.5 MG/1
2.5 TABLET, EXTENDED RELEASE ORAL DAILY
Qty: 90 TABLET | Refills: 0 | Status: SHIPPED | OUTPATIENT
Start: 2022-02-01 | End: 2022-05-03

## 2022-02-11 ENCOUNTER — OFFICE VISIT (OUTPATIENT)
Dept: CARDIOLOGY CLINIC | Age: 67
End: 2022-02-11
Payer: MEDICARE

## 2022-02-11 VITALS
SYSTOLIC BLOOD PRESSURE: 130 MMHG | WEIGHT: 215 LBS | DIASTOLIC BLOOD PRESSURE: 68 MMHG | HEART RATE: 50 BPM | OXYGEN SATURATION: 98 % | BODY MASS INDEX: 33.74 KG/M2 | HEIGHT: 67 IN

## 2022-02-11 DIAGNOSIS — I10 ESSENTIAL HYPERTENSION: ICD-10-CM

## 2022-02-11 DIAGNOSIS — E11.22 TYPE 2 DIABETES MELLITUS WITH STAGE 3A CHRONIC KIDNEY DISEASE, WITHOUT LONG-TERM CURRENT USE OF INSULIN (HCC): ICD-10-CM

## 2022-02-11 DIAGNOSIS — R00.2 PALPITATION: Primary | ICD-10-CM

## 2022-02-11 DIAGNOSIS — E78.5 DYSLIPIDEMIA: ICD-10-CM

## 2022-02-11 DIAGNOSIS — N18.31 TYPE 2 DIABETES MELLITUS WITH STAGE 3A CHRONIC KIDNEY DISEASE, WITHOUT LONG-TERM CURRENT USE OF INSULIN (HCC): ICD-10-CM

## 2022-02-11 PROCEDURE — 99214 OFFICE O/P EST MOD 30 MIN: CPT | Performed by: INTERNAL MEDICINE

## 2022-02-11 RX ORDER — AMLODIPINE BESYLATE 10 MG/1
TABLET ORAL
Qty: 90 TABLET | Refills: 1 | Status: SHIPPED | OUTPATIENT
Start: 2022-02-11 | End: 2022-08-22

## 2022-02-11 RX ORDER — ATORVASTATIN CALCIUM 10 MG/1
10 TABLET, FILM COATED ORAL DAILY
Qty: 90 TABLET | Refills: 1 | Status: SHIPPED | OUTPATIENT
Start: 2022-02-11 | End: 2022-05-03

## 2022-02-11 RX ORDER — METOPROLOL SUCCINATE 25 MG/1
25 TABLET, EXTENDED RELEASE ORAL DAILY
Qty: 90 TABLET | Refills: 1 | Status: SHIPPED | OUTPATIENT
Start: 2022-02-11 | End: 2022-08-22

## 2022-02-11 ASSESSMENT — ENCOUNTER SYMPTOMS
STRIDOR: 0
RESPIRATORY NEGATIVE: 1
GASTROINTESTINAL NEGATIVE: 1
NAUSEA: 0
WHEEZING: 0
BLOOD IN STOOL: 0
CHEST TIGHTNESS: 0
COUGH: 0
SHORTNESS OF BREATH: 0
EYES NEGATIVE: 1

## 2022-02-11 NOTE — PROGRESS NOTES
Subsequent Progress Note  Patient: Huy Tavares  YOB: 1955  MRN: [id number]    Chief Complaint: HTN Palpitations.    Chief Complaint   Patient presents with   Vicki Palacios Doctor     prev dr Ervin Dangelo    Hypertension    Palpitations   Dr. Inga Gotti    CV Data:  4/21 PVR negative  5/21 echo ef 50   9/2018 spect negative     Anna's mother-in- law  Nonsmoker  No etoh  Lives with       Subjective/HPI:  Doing well no  Cp no sob rare palps no bleed no falls active     EKG:    Past Medical History:   Diagnosis Date    Allergic rhinitis     Asthma     Chest pain, unspecified 8/20/2018    Endometrial cancer, grade I ([address] 2011    [facility]    Hypertension     Kidney problem     blockage in urethra and urine backs up has to get urethra dilated, has scar tissue    Type 2 diabetes mellitus with chronic kidney disease, without long-term current use of insulin ([facility] 6/21/2019       Past Surgical History:   Procedure Laterality Date    CATARACT REMOVAL Left 11/2020    CHOLECYSTECTOMY  1990    HYSTERECTOMY  2011    endometrial cancer stage !a    URETHRAL STRICTURE DILATATION  2016    Dr. Speedy Moct[place]uma        Family History   Problem Relation Age of Onset    Rheum Arthritis Mother     Osteoporosis Mother     No Known Problems Father         complications afte gallbladder surgery    Osteoarthritis Sister     Coronary Art Dis Brother     Heart Surgery Brother     Osteoarthritis Sister        Social History     Socioeconomic History    Marital status:      Spouse name: None    Number of children: None    Years of education: None    Highest education level: None   Occupational History    None   Tobacco Use    Smoking status: Never Smoker    Smokeless tobacco: Never Used   Substance and Sexual Activity    Alcohol use: No    Drug use: No    Sexual activity: None   Other Topics Concern    None   Social History Narrative    None     Social Determinants of Health     Financial Resource Strain: Low Risk     Difficulty of Paying Living Expenses: Not hard at all   Food Insecurity: No Food Insecurity    Worried About Running Out of Food in the Last Year: Never true    [place]aly of Food in the Last Year: Never true   Transportation Needs: No Transportation Needs    Lack of Transportation (Medical): No    Lack of Transportation (Non-Medical):  No   Physical Activity:     Days of Exercise per Week: Not on file    Minutes of Exercise per Session: Not on file   Stress:     Feeling of Stress : Not on file   Social Connections:     Frequency of Communication with Friends and Family: Not on file    Frequency of Social Gatherings with Friends and Family: Not on file    Attends Mormonism Services: Not on file    Active Member of [address] [place] [address] Walk-in Appointment Scheduler or Organizations: Not on file    Attends Club or Organization Meetings: Not on file    Marital Status: Not on file   Intimate Partner Violence:     Fear of Current or Ex-Partner: Not on file    Emotionally Abused: Not on file    Physically Abused: Not on file    Sexually Abused: Not on file   Housing Stability:     Unable to Pay for Housing in the Last Year: Not on file    Number of [place] in the Last Year: Not on file    Unstable Housing in the Last Year: Not on file       Allergies   Allergen Reactions    Ciprofloxacin        Current Outpatient Medications   Medication Sig Dispense Refill    atorvastatin (LIPITOR) 10 MG tablet Take 1 tablet by mouth daily 90 tablet 1    amLODIPine (NORVASC) 10 MG tablet Take 1 tablet by mouth once daily 90 tablet 1    metoprolol succinate (TOPROL XL) 25 MG extended release tablet Take 1 tablet by mouth daily 90 tablet 1    glipiZIDE (GLUCOTROL XL) 2.5 MG extended release tablet Take 1 tablet by mouth daily 90 tablet 0    gabapentin (NEURONTIN) 300 MG capsule TAKE 1 CAPSULE BY MOUTH THREE TIMES DAILY 90 capsule 0    Lancets (ONETOUCH DELICA PLUS [id number]) MISC USE 1  TO CHECK GLUCOSE THREE TIMES DAILY 100 each 0    ONETOUCH ULTRA strip USE 1 STRIP TO CHECK GLUCOSE THREE TIMES DAILY 100 each 0    tamsulosin (FLOMAX) 0.4 MG capsule Take 1 capsule by mouth daily 90 capsule 3    nystatin (MYCOSTATIN) 229153 UNIT/GM cream Apply topically 2 times daily. 1 Tube 1    blood glucose monitor kit and supplies DX: diabetes mellitus. Test 3 time(s) daily - Ok to substitute per insurance 1 kit 1    VITAMIN D PO Take by mouth      CRANBERRY PO Take by mouth      Alcohol Swabs PADS DX: diabetes mellitus. Test 1-3 time(s) daily - Ok to substitute per insurance (1 each = 1 box) 1 each 5    aspirin 81 MG tablet Take 81 mg by mouth daily       No current facility-administered medications for this visit. Review of Systems:   Review of Systems   Constitutional: Negative. Negative for diaphoresis and fatigue. HENT: Negative. Eyes: Negative. Respiratory: Negative. Negative for cough, chest tightness, shortness of breath, wheezing and stridor. Cardiovascular: Negative. Negative for chest pain, palpitations and leg swelling. Gastrointestinal: Negative. Negative for blood in stool and nausea. Genitourinary: Negative. Musculoskeletal: Negative. Skin: Negative. Neurological: Negative. Negative for dizziness, syncope, weakness and light-headedness. Hematological: Negative. Psychiatric/Behavioral: Negative. Physical Examination:    /68 (Site: Right Upper Arm, Position: Sitting, Cuff Size: Large Adult)   Pulse 50   Ht 5' 7\" (1.702 m)   Wt 215 lb (97.5 kg)   LMP  (LMP Unknown)   SpO2 98%   BMI 33.67 kg/m²    Physical Exam   Constitutional: She appears healthy. No distress. HENT:   Normal cephalic and Atraumatic   Eyes: Pupils are equal, round, and reactive to light. Neck: Thyroid normal. No JVD present. No neck adenopathy. No thyromegaly present. Cardiovascular: Normal rate, regular rhythm, normal heart sounds, intact distal pulses and normal pulses.    Pulmonary/Chest: Effort normal and breath sounds normal. She has no wheezes. She has no rales. She exhibits no tenderness. Abdominal: Soft. Bowel sounds are normal. There is no abdominal tenderness. Musculoskeletal:         General: No tenderness or edema. Normal range of motion. Cervical back: Normal range of motion and neck supple. Neurological: She is alert and oriented to person, place, and time. Skin: Skin is warm. No cyanosis. Nails show no clubbing.        LABS:  CBC:   Lab Results   Component Value Date    WBC 5.8 08/05/2021    RBC 4.00 08/05/2021    HGB 12.5 08/05/2021    HCT 38.2 08/05/2021    MCV 95.7 08/05/2021    MCH 31.3 08/05/2021    MCHC 32.8 08/05/2021    RDW 13.6 08/05/2021     08/05/2021    MPV 7.5 10/12/2015     Lipids:  Lab Results   Component Value Date    CHOL 164 08/05/2021    CHOL 162 06/19/2020    CHOL 209 (H) 08/10/2018     Lab Results   Component Value Date    TRIG 95 08/05/2021    TRIG 115 06/19/2020    TRIG 157 08/10/2018     Lab Results   Component Value Date    HDL 67 (H) 08/05/2021    HDL 64 (H) 06/19/2020    HDL 54 08/10/2018     Lab Results   Component Value Date    LDLCALC 78 08/05/2021    LDLCALC 75 06/19/2020    LDLCALC 124 08/10/2018     No results found for: LABVLDL, VLDL  No results found for: CHOLHDLRATIO  CMP:    Lab Results   Component Value Date     12/15/2021    K 4.8 12/15/2021     12/15/2021    CO2 24 12/15/2021    BUN 26 12/15/2021    CREATININE 1.50 12/15/2021    GFRAA 42.0 12/15/2021    LABGLOM 34.7 12/15/2021    GLUCOSE 148 12/15/2021    PROT 6.6 08/05/2021    LABALBU 4.0 12/15/2021    CALCIUM 9.2 12/15/2021    BILITOT 0.6 08/05/2021    ALKPHOS 85 08/05/2021    AST 13 08/05/2021    ALT 10 08/05/2021     BMP:    Lab Results   Component Value Date     12/15/2021    K 4.8 12/15/2021     12/15/2021    CO2 24 12/15/2021    BUN 26 12/15/2021    LABALBU 4.0 12/15/2021    CREATININE 1.50 12/15/2021    CALCIUM 9.2 12/15/2021    GFRAA 42.0 12/15/2021 LABGLOM 34.7 12/15/2021    GLUCOSE 148 12/15/2021     Magnesium:    Lab Results   Component Value Date    MG 2.2 03/19/2021     TSH:No results found for: TSHFT4, TSH    Patient Active Problem List   Diagnosis    Urethral stricture    Retention of urine    Renal insufficiency syndrome    Renal failure syndrome    Renal cyst    Pancreas cyst    Osteoarthrosis involving lower leg    Essential hypertension    Asthma    Allergic rhinitis    Chest pain, unspecified    Type 2 diabetes mellitus with chronic kidney disease, without long-term current use of insulin (HCC)    Arteriosclerosis of coronary artery    Stage 4 chronic kidney disease (HCC)       Medications Discontinued During This Encounter   Medication Reason    metoprolol succinate (TOPROL XL) 25 MG extended release tablet REORDER    amLODIPine (NORVASC) 10 MG tablet REORDER    atorvastatin (LIPITOR) 10 MG tablet REORDER       Modified Medications    Modified Medication Previous Medication    AMLODIPINE (NORVASC) 10 MG TABLET amLODIPine (NORVASC) 10 MG tablet       Take 1 tablet by mouth once daily    Take 1 tablet by mouth once daily    ATORVASTATIN (LIPITOR) 10 MG TABLET atorvastatin (LIPITOR) 10 MG tablet       Take 1 tablet by mouth daily    Take 1 tablet by mouth daily    METOPROLOL SUCCINATE (TOPROL XL) 25 MG EXTENDED RELEASE TABLET metoprolol succinate (TOPROL XL) 25 MG extended release tablet       Take 1 tablet by mouth daily    Take 1 tablet by mouth daily       Orders Placed This Encounter   Medications    atorvastatin (LIPITOR) 10 MG tablet     Sig: Take 1 tablet by mouth daily     Dispense:  90 tablet     Refill:  1    amLODIPine (NORVASC) 10 MG tablet     Sig: Take 1 tablet by mouth once daily     Dispense:  90 tablet     Refill:  1    metoprolol succinate (TOPROL XL) 25 MG extended release tablet     Sig: Take 1 tablet by mouth daily     Dispense:  90 tablet     Refill:  1       Assessment/Plan:    1.  Type 2 diabetes mellitus

## 2022-02-22 DIAGNOSIS — M54.16 LUMBAR RADICULOPATHY: ICD-10-CM

## 2022-02-23 RX ORDER — GABAPENTIN 300 MG/1
300 CAPSULE ORAL 3 TIMES DAILY
Qty: 90 CAPSULE | Refills: 2 | Status: SHIPPED | OUTPATIENT
Start: 2022-02-23 | End: 2022-05-24

## 2022-02-23 NOTE — TELEPHONE ENCOUNTER
Pharmacy requesting medication refill. Please approve or deny this request.    Rx requested:  Requested Prescriptions     Pending Prescriptions Disp Refills    gabapentin (NEURONTIN) 300 MG capsule [Pharmacy Med Name: Gabapentin 300 MG Oral Capsule] 90 capsule 0     Sig: Take 1 capsule by mouth 3 times daily for 30 days.          Last Office Visit:   1/28/2022      Next Visit Date:  Future Appointments   Date Time Provider Dawna Biggs   5/4/2022  9:00 AM Maida Marina MD [place] [address]   11/15/2022  9:15 AM Queta Tobias MD [facility]   2/10/2023  1:15 PM Evelyn Bailey MD [facility]

## 2022-04-13 DIAGNOSIS — E11.9 TYPE 2 DIABETES MELLITUS WITHOUT COMPLICATION, WITHOUT LONG-TERM CURRENT USE OF INSULIN (HCC): ICD-10-CM

## 2022-04-13 NOTE — TELEPHONE ENCOUNTER
Pharmacy is requesting medication refill.  Please approve or deny this request.    Rx requested:  Requested Prescriptions     Pending Prescriptions Disp Refills    Lancets ([address]) [address] [facility] [Pharmacy Med Name: Petar MIX 82P MIS] 644 each 0     Sig: USE 1  TO CHECK GLUCOSE THREE TIMES DAILY    [address] [facility] [Pharmacy Med Name: OneTouch Ultra Blue In Vitro Strip] 100 each 0     Sig: USE 1 STRIP  [address]         Last Office Visit:   1/28/2022      Next Visit Date:  Future Appointments   Date Time Provider Dawna Biggs   5/4/2022  9:00 AM Devin Brasher MD [facility]   11/15/2022  9:15 AM Brooke Tobar MD [facility]   2/10/2023  1:15 PM Jamal Grewal  [facility]

## 2022-04-14 RX ORDER — LANCETS 33 GAUGE
EACH MISCELLANEOUS
Qty: 100 EACH | Refills: 5 | Status: SHIPPED | OUTPATIENT
Start: 2022-04-14

## 2022-04-14 RX ORDER — BLOOD SUGAR DIAGNOSTIC
STRIP MISCELLANEOUS
Qty: 100 EACH | Refills: 5 | Status: SHIPPED | OUTPATIENT
Start: 2022-04-14

## 2022-05-01 DIAGNOSIS — E11.22 TYPE 2 DIABETES MELLITUS WITH STAGE 3A CHRONIC KIDNEY DISEASE, WITHOUT LONG-TERM CURRENT USE OF INSULIN (HCC): ICD-10-CM

## 2022-05-01 DIAGNOSIS — E11.22 TYPE 2 DIABETES MELLITUS WITH STAGE 3 CHRONIC KIDNEY DISEASE, WITHOUT LONG-TERM CURRENT USE OF INSULIN (HCC): Chronic | ICD-10-CM

## 2022-05-01 DIAGNOSIS — N18.30 TYPE 2 DIABETES MELLITUS WITH STAGE 3 CHRONIC KIDNEY DISEASE, WITHOUT LONG-TERM CURRENT USE OF INSULIN (HCC): Chronic | ICD-10-CM

## 2022-05-01 DIAGNOSIS — N18.31 TYPE 2 DIABETES MELLITUS WITH STAGE 3A CHRONIC KIDNEY DISEASE, WITHOUT LONG-TERM CURRENT USE OF INSULIN (HCC): ICD-10-CM

## 2022-05-03 RX ORDER — ATORVASTATIN CALCIUM 10 MG/1
TABLET, FILM COATED ORAL
Qty: 90 TABLET | Refills: 0 | Status: SHIPPED | OUTPATIENT
Start: 2022-05-03 | End: 2022-08-01 | Stop reason: SDUPTHER

## 2022-05-03 RX ORDER — GLIPIZIDE 2.5 MG/1
TABLET, EXTENDED RELEASE ORAL
Qty: 90 TABLET | Refills: 0 | Status: SHIPPED | OUTPATIENT
Start: 2022-05-03 | End: 2022-08-01 | Stop reason: SDUPTHER

## 2022-05-03 NOTE — TELEPHONE ENCOUNTER
Comments:     Last Office Visit (last PCP visit):   1/28/2022    Next Visit Date:  Future Appointments   Date Time Provider Dawna Biggs   5/4/2022  9:00 MD Neeta Covington 94   11/15/2022  9:15 AM Rubi Sims MD [facility]   2/10/2023  1:15 PM Jagjit Mcguire MD [facility]       **If hasn't been seen in over a year OR hasn't followed up according to last diabetes/ADHD visit, make appointment for patient before sending refill to provider.     Rx requested:  Requested Prescriptions     Pending Prescriptions Disp Refills    atorvastatin (LIPITOR) 10 MG tablet [Pharmacy Med Name: Atorvastatin Calcium 10 MG Oral Tablet] 90 tablet 0     Sig: Take 1 tablet by mouth once daily    glipiZIDE (GLUCOTROL XL) 2.5 MG extended release tablet [Pharmacy Med Name: glipiZIDE ER 2.5 MG Oral Tablet Extended Release 24 Hour] 90 tablet 0     Sig: Take 1 tablet by mouth once daily

## 2022-05-04 ENCOUNTER — OFFICE VISIT (OUTPATIENT)
Dept: FAMILY MEDICINE CLINIC | Age: 67
End: 2022-05-04
Payer: MEDICARE

## 2022-05-04 VITALS
HEART RATE: 70 BPM | SYSTOLIC BLOOD PRESSURE: 130 MMHG | BODY MASS INDEX: 32.58 KG/M2 | TEMPERATURE: 97.4 F | WEIGHT: 215 LBS | HEIGHT: 68 IN | DIASTOLIC BLOOD PRESSURE: 72 MMHG | OXYGEN SATURATION: 98 %

## 2022-05-04 DIAGNOSIS — Z12.11 COLON CANCER SCREENING: ICD-10-CM

## 2022-05-04 DIAGNOSIS — Z23 NEED FOR PROPHYLACTIC VACCINATION AGAINST DIPHTHERIA-TETANUS-PERTUSSIS (DTP): ICD-10-CM

## 2022-05-04 DIAGNOSIS — N18.32 TYPE 2 DIABETES MELLITUS WITH STAGE 3B CHRONIC KIDNEY DISEASE, WITHOUT LONG-TERM CURRENT USE OF INSULIN (HCC): ICD-10-CM

## 2022-05-04 DIAGNOSIS — Z00.00 INITIAL MEDICARE ANNUAL WELLNESS VISIT: Primary | ICD-10-CM

## 2022-05-04 DIAGNOSIS — E11.22 TYPE 2 DIABETES MELLITUS WITH STAGE 3B CHRONIC KIDNEY DISEASE, WITHOUT LONG-TERM CURRENT USE OF INSULIN (HCC): ICD-10-CM

## 2022-05-04 PROCEDURE — G0438 PPPS, INITIAL VISIT: HCPCS | Performed by: FAMILY MEDICINE

## 2022-05-04 PROCEDURE — 3051F HG A1C>EQUAL 7.0%<8.0%: CPT | Performed by: FAMILY MEDICINE

## 2022-05-04 SDOH — ECONOMIC STABILITY: FOOD INSECURITY: WITHIN THE PAST 12 MONTHS, THE FOOD YOU BOUGHT JUST DIDN'T LAST AND YOU DIDN'T HAVE MONEY TO GET MORE.: NEVER TRUE

## 2022-05-04 SDOH — ECONOMIC STABILITY: FOOD INSECURITY: WITHIN THE PAST 12 MONTHS, YOU WORRIED THAT YOUR FOOD WOULD RUN OUT BEFORE YOU GOT MONEY TO BUY MORE.: NEVER TRUE

## 2022-05-04 ASSESSMENT — PATIENT HEALTH QUESTIONNAIRE - PHQ9
SUM OF ALL RESPONSES TO PHQ9 QUESTIONS 1 & 2: 0
1. LITTLE INTEREST OR PLEASURE IN DOING THINGS: 0
SUM OF ALL RESPONSES TO PHQ QUESTIONS 1-9: 0
SUM OF ALL RESPONSES TO PHQ QUESTIONS 1-9: 0
2. FEELING DOWN, DEPRESSED OR HOPELESS: 0
SUM OF ALL RESPONSES TO PHQ QUESTIONS 1-9: 0
SUM OF ALL RESPONSES TO PHQ QUESTIONS 1-9: 0

## 2022-05-04 ASSESSMENT — LIFESTYLE VARIABLES: HOW OFTEN DO YOU HAVE A DRINK CONTAINING ALCOHOL: NEVER

## 2022-05-04 ASSESSMENT — SOCIAL DETERMINANTS OF HEALTH (SDOH): HOW HARD IS IT FOR YOU TO PAY FOR THE VERY BASICS LIKE FOOD, HOUSING, MEDICAL CARE, AND HEATING?: NOT HARD AT ALL

## 2022-05-04 NOTE — PROGRESS NOTES
Medicare Annual Wellness Visit    Griselda Siddiqui is here for Medicare AWV    Assessment & Plan   Initial Medicare annual wellness visit  -     CBC with Auto Differential; Future  -     Comprehensive Metabolic Panel; Future  -     Lipid, Fasting; Future  Need for prophylactic vaccination against diphtheria-tetanus-pertussis (DTP)  -     Tdap (ADACEL) 5-2-15.5 LF-MCG/0.5 injection; Inject 0.5 mLs into the muscle once for 1 dose, Disp-0.5 mL, R-0Print  Type 2 diabetes mellitus with stage 3b chronic kidney disease, without long-term current use of insulin (HCC)  -      DIABETES FOOT EXAM  -     Hemoglobin A1C; Future  Colon cancer screening  -     Ambulatory referral to Gastroenterology      Recommendations for Preventive Services Due: see orders and patient instructions/AVS.  Recommended screening schedule for the next 5-10 years is provided to the patient in written form: see Patient Instructions/AVS.     Return for Medicare Annual Wellness Visit in 1 year. Subjective       Patient's complete Health Risk Assessment and screening values have been reviewed and are found in Flowsheets. The following problems were reviewed today and where indicated follow up appointments were made and/or referrals ordered.     Positive Risk Factor Screenings with Interventions:               General Health and ACP:  General  In general, how would you say your health is?: Good  In the past 7 days, have you experienced any of the following: New or Increased Pain, New or Increased Fatigue, Loneliness, Social Isolation, Stress or Anger?: No  Do you get the social and emotional support that you need?: Yes  Do you have a Living Will?: (!) No    Advance Directives     Power of  Living Will ACP-Advance Directive ACP-Power of     Not on File Not on File Not on File Not on File      General Health Risk Interventions:  · No Living Will: Patient referred to [place] Habits/Nutrition:     Physical Activity: Insufficiently Active    Days of Exercise per Week: 3 days    Minutes of Exercise per Session: 10 min     Have you lost any weight without trying in the past 3 months?: No  Body mass index: (!) 32.69  Have you seen the dentist within the past year?: Yes    Health Habits/Nutrition Interventions:  · No intervention needed at this time             Objective   Vitals:    05/04/22 0916   BP: 130/72   Pulse: 70   Temp: 97.4 °F (36.3 °C)   TempSrc: Infrared   SpO2: 98%   Weight: 215 lb (97.5 kg)   Height: 5' 8\" (1.727 m)      Body mass index is 32.69 kg/m². General Appearance: alert and oriented to person, place and time, well developed and well- nourished, in no acute distress  Skin: warm and dry, no rash or erythema  Head: normocephalic and atraumatic  Eyes: pupils equal, round, and reactive to light, extraocular eye movements intact, conjunctivae normal  ENT: tympanic membrane, external ear and ear canal normal bilaterally, nose without deformity, nasal mucosa and turbinates normal without polyps  Neck: supple and non-tender without mass, no thyromegaly or thyroid nodules, no cervical lymphadenopathy  Pulmonary/Chest: clear to auscultation bilaterally- no wheezes, rales or rhonchi, normal air movement, no respiratory distress  Cardiovascular: normal rate, regular rhythm, normal S1 and S2, no murmurs, rubs, clicks, or gallops, distal pulses intact, no carotid bruits  Abdomen: soft, non-tender, non-distended, normal bowel sounds, no masses or organomegaly  Extremities: no cyanosis, clubbing or edema  Musculoskeletal: normal range of motion, no joint swelling, deformity or tenderness  Neurologic: reflexes normal and symmetric, no cranial nerve deficit, gait, coordination and speech normal       Allergies   Allergen Reactions    Ciprofloxacin      Prior to Visit Medications    Medication Sig Taking?  Authorizing Provider   atorvastatin (LIPITOR) 10 MG tablet Take 1 tablet by mouth once daily  Bi NUNN MD [place]   glipiZIDE (GLUCOTROL XL) 2.5 MG extended release tablet Take 1 tablet by mouth once daily  [address], MD   Lancets (ONETOUCH DELICA PLUS [id number]) [address] USE 1  TO CHECK GLUCOSE THREE TIMES DAILY  [address], MD   ONETOUCH ULTRA strip USE 1 STRIP TO [facility] MD Maximo   gabapentin (NEURONTIN) 300 MG capsule Take 1 capsule by mouth 3 times daily for 30 days. Bree Moses MD   amLODIPine (NORVASC) 10 MG tablet Take 1 tablet by mouth once daily  Jamal Grewal MD   metoprolol succinate (TOPROL XL) 25 MG extended release tablet Take 1 tablet by mouth daily  Jamal Grewal MD   tamsulosin (FLOMAX) 0.4 MG capsule Take 1 capsule by mouth daily  Brooke Tobar MD   nystatin (MYCOSTATIN) 107440 UNIT/GM cream Apply topically 2 times daily. Jackie Petersen DO   blood glucose monitor kit and supplies DX: diabetes mellitus. Test 3 time(s) daily - Ok to substitute per insurance  [address], MD   VITAMIN D PO Take by mouth  Historical Provider, MD   CRANBERRY PO Take by mouth  Historical Provider, MD   Alcohol Swabs PADS DX: diabetes mellitus.  Test 1-3 time(s) daily - Ok to substitute per insurance (1 each = 1 box)  [address], MD   aspirin 81 MG tablet Take 81 mg by mouth daily  Historical Provider, MD [place] (Including outside providers/suppliers regularly involved in providing care):   Patient Care Team:  [address], MD as PCP - General (Family Medicine)  Bree Moses MD as PCP - [facility] Provider  Polo Nunes MD as Cardiologist (Cardiology)    Reviewed and updated this visit:  Tobacco  Allergies  Meds  Med Hx  Surg Hx  Soc Hx  Fam Hx

## 2022-05-04 NOTE — PATIENT INSTRUCTIONS
Personalized Preventive Plan for Flora Clark - 5/4/2022  Medicare offers a range of preventive health benefits. Some of the tests and screenings are paid in full while other may be subject to a deductible, co-insurance, and/or copay. Some of these benefits include a comprehensive review of your medical history including lifestyle, illnesses that may run in your family, and various assessments and screenings as appropriate. After reviewing your medical record and screening and assessments performed today your provider may have ordered immunizations, labs, imaging, and/or referrals for you. A list of these orders (if applicable) as well as your Preventive Care list are included within your After Visit Summary for your review. Other Preventive Recommendations:    · A preventive eye exam performed by an eye specialist is recommended every 1-2 years to screen for glaucoma; cataracts, macular degeneration, and other eye disorders. · A preventive dental visit is recommended every 6 months. · Try to get at least 150 minutes of exercise per week or 10,000 steps per day on a pedometer . · Order or download the FREE \"Exercise & Physical Activity: Your Everyday Guide\" from [employer]. Call 5-861.875.7913 or search [employer] online. · You need 2265-9416 mg of calcium and 0295-7243 IU of vitamin D per day. It is possible to meet your calcium requirement with diet alone, but a vitamin D supplement is usually necessary to meet this goal.  · When exposed to the sun, use a sunscreen that protects against both UVA and UVB radiation with an SPF of 30 or greater. Reapply every 2 to 3 hours or after sweating, drying off with a towel, or swimming. · Always wear a seat belt when traveling in a car. Always wear a helmet when riding a bicycle or motorcycle.

## 2022-05-07 ENCOUNTER — HOSPITAL ENCOUNTER (OUTPATIENT)
Dept: LAB | Age: 67
Discharge: HOME | End: 2022-05-07
Payer: MEDICARE

## 2022-05-07 DIAGNOSIS — N18.32 TYPE 2 DIABETES MELLITUS WITH STAGE 3B CHRONIC KIDNEY DISEASE, WITHOUT LONG-TERM CURRENT USE OF INSULIN (HCC): ICD-10-CM

## 2022-05-07 DIAGNOSIS — Z00.00 INITIAL MEDICARE ANNUAL WELLNESS VISIT: ICD-10-CM

## 2022-05-07 DIAGNOSIS — E11.22 TYPE 2 DIABETES MELLITUS WITH STAGE 3B CHRONIC KIDNEY DISEASE, WITHOUT LONG-TERM CURRENT USE OF INSULIN (HCC): ICD-10-CM

## 2022-05-07 LAB
ALBUMIN SERPL-MCNC: 4.1 G/DL (ref 3.5–4.6)
ALP BLD-CCNC: 75 U/L (ref 40–130)
ALT SERPL-CCNC: 7 U/L (ref 0–33)
ANION GAP SERPL CALCULATED.3IONS-SCNC: 13 MEQ/L (ref 9–15)
AST SERPL-CCNC: 14 U/L (ref 0–35)
BASOPHILS ABSOLUTE: 0 K/UL (ref 0–0.2)
BASOPHILS RELATIVE PERCENT: 0.7 %
BILIRUB SERPL-MCNC: 0.6 MG/DL (ref 0.2–0.7)
BUN BLDV-MCNC: 23 MG/DL (ref 8–23)
CALCIUM SERPL-MCNC: 9.2 MG/DL (ref 8.5–9.9)
CHLORIDE BLD-SCNC: 105 MEQ/L (ref 95–107)
CHOLESTEROL, FASTING: 175 MG/DL (ref 0–199)
CO2: 23 MEQ/L (ref 20–31)
CREAT SERPL-MCNC: 1.45 MG/DL (ref 0.5–0.9)
EOSINOPHILS ABSOLUTE: 0.2 K/UL (ref 0–0.7)
EOSINOPHILS RELATIVE PERCENT: 2.7 %
GFR AFRICAN AMERICAN: 43.6
GFR NON-AFRICAN AMERICAN: 36
GLOBULIN: 2.9 G/DL (ref 2.3–3.5)
GLUCOSE BLD-MCNC: 185 MG/DL (ref 70–99)
HBA1C MFR BLD: 7 % (ref 4.8–5.9)
HCT VFR BLD CALC: 39.4 % (ref 37–47)
HDLC SERPL-MCNC: 55 MG/DL (ref 40–59)
HEMOGLOBIN: 12.9 G/DL (ref 12–16)
LDL CHOLESTEROL CALCULATED: 92 MG/DL (ref 0–129)
LYMPHOCYTES ABSOLUTE: 1.9 K/UL (ref 1–4.8)
LYMPHOCYTES RELATIVE PERCENT: 29.7 %
MCH RBC QN AUTO: 32.3 PG (ref 27–31.3)
MCHC RBC AUTO-ENTMCNC: 32.9 % (ref 33–37)
MCV RBC AUTO: 98.4 FL (ref 82–100)
MONOCYTES ABSOLUTE: 0.5 K/UL (ref 0.2–0.8)
MONOCYTES RELATIVE PERCENT: 8.2 %
NEUTROPHILS ABSOLUTE: 3.8 K/UL (ref 1.4–6.5)
NEUTROPHILS RELATIVE PERCENT: 58.7 %
PDW BLD-RTO: 12.7 % (ref 11.5–14.5)
PLATELET # BLD: 262 K/UL (ref 130–400)
POTASSIUM SERPL-SCNC: 4.6 MEQ/L (ref 3.4–4.9)
RBC # BLD: 4 M/UL (ref 4.2–5.4)
SODIUM BLD-SCNC: 141 MEQ/L (ref 135–144)
TOTAL PROTEIN: 7 G/DL (ref 6.3–8)
TRIGLYCERIDE, FASTING: 138 MG/DL (ref 0–150)
WBC # BLD: 6.5 K/UL (ref 4.8–10.8)

## 2022-05-07 PROCEDURE — 85025 COMPLETE CBC W/AUTO DIFF WBC: CPT

## 2022-05-07 PROCEDURE — 83036 HEMOGLOBIN GLYCOSYLATED A1C: CPT

## 2022-05-07 PROCEDURE — 80053 COMPREHEN METABOLIC PANEL: CPT

## 2022-05-07 PROCEDURE — 36415 COLL VENOUS BLD VENIPUNCTURE: CPT

## 2022-05-07 PROCEDURE — 80061 LIPID PANEL: CPT

## 2022-05-23 DIAGNOSIS — M54.16 LUMBAR RADICULOPATHY: ICD-10-CM

## 2022-05-24 RX ORDER — GABAPENTIN 300 MG/1
300 CAPSULE ORAL 3 TIMES DAILY
Qty: 90 CAPSULE | Refills: 0 | Status: SHIPPED | OUTPATIENT
Start: 2022-05-24 | End: 2022-06-23

## 2022-05-24 NOTE — TELEPHONE ENCOUNTER
Pharmacy is requesting medication refill. Please approve or deny this request.    Rx requested:  Requested Prescriptions     Pending Prescriptions Disp Refills    gabapentin (NEURONTIN) 300 MG capsule [Pharmacy Med Name: Gabapentin 300 MG Oral Capsule] 90 capsule 0     Sig: Take 1 capsule by mouth 3 times daily for 30 days.          Last Office Visit:   5/4/2022      Next Visit Date:  Future Appointments   Date Time Provider Dawna Biggs   11/2/2022  8:40 AM Daniel Pruett MD [place] [address]   11/15/2022  9:15 AM Christie Osorio MD [facility]   2/10/2023  1:15 PM Dayan Cardona MD [address] [place] [address]

## 2022-06-23 DIAGNOSIS — M54.16 LUMBAR RADICULOPATHY: ICD-10-CM

## 2022-06-23 NOTE — TELEPHONE ENCOUNTER
Pharmacy is requesting medication refill. Please approve or deny this request.    Rx requested:  Requested Prescriptions     Pending Prescriptions Disp Refills    gabapentin (NEURONTIN) 300 MG capsule [Pharmacy Med Name: Gabapentin 300 MG Oral Capsule] 90 capsule 0     Sig: Take 1 capsule by mouth 3 times daily.          Last Office Visit:   5/4/2022      Next Visit Date:  Future Appointments   Date Time Provider Department Center   7/25/2022  9:30 AM Neida Lund DO OB/GYN [address] [place]   11/2/2022  8:40 AM [id number] Neeta Mccurdy [place] [address]   11/15/2022  9:15 AM Ed Tejada, [address]   2/10/2023  1:15 PM Mynor Hammer MD [address] [place] [address]

## 2022-06-24 RX ORDER — GABAPENTIN 300 MG/1
300 CAPSULE ORAL 3 TIMES DAILY
Qty: 90 CAPSULE | Refills: 0 | Status: SHIPPED | OUTPATIENT
Start: 2022-06-24 | End: 2022-07-26

## 2022-07-07 ENCOUNTER — TELEPHONE (OUTPATIENT)
Dept: FAMILY MEDICINE CLINIC | Age: 67
End: 2022-07-07

## 2022-07-07 DIAGNOSIS — M54.16 LUMBAR RADICULOPATHY: Primary | ICD-10-CM

## 2022-07-07 NOTE — TELEPHONE ENCOUNTER
----- Message from Humza Henriquez sent at 7/7/2022  2:08 PM EDT -----  Subject: Message to Provider    QUESTIONS  Information for Provider? pt is requesting a handicap placard from dr Audie Dior  ---------------------------------------------------------------------------  --------------  Brunilda Kovacs EQPX  [phone]; OK to leave message on voicemail  ---------------------------------------------------------------------------  --------------  SCRIPT ANSWERS  Relationship to Patient?  Self

## 2022-07-11 ENCOUNTER — NURSE TRIAGE (OUTPATIENT)
Dept: OTHER | Facility: CLINIC | Age: 67
End: 2022-07-11

## 2022-07-11 NOTE — TELEPHONE ENCOUNTER
Received call from Philip Castle at [facility] with [employer]. Subjective: Caller states \"I have a knot on bottom of my toes\"     Current Symptoms: Knot on bottom of her L big toe. When walks the bump moves to the side. R foot and ankle swelling. Skin feels tight. No chest pain or trouble breathing. Onset: 1 week ago; worsening    Associated Symptoms: NA    Pain Severity: 5-6/10; aching, tightness; constant, moderate    Temperature: denies fever     What has been tried: Tylenol Arthritis    LMP: NA Pregnant: NA    Recommended disposition: See HCP within 4 Hours (or PCP triage)    Care advice provided, patient verbalizes understanding; denies any other questions or concerns; instructed to call back for any new or worsening symptoms. Tried to reach PCP office x2, first time rang for several minutes and then to dead air, second attempt rang numerous times and then to fast busy signal.  Sent message to PCP office as high priority. Did transfer patient to Giselle olivia at [facility] to book appt within 4 hours. Advised patient if unable to get in within the 4 hours to be seen at [facility] or Walk In. Patient verbalized understanding. Attention Provider: Thank you for allowing me to participate in the care of your patient. The patient was connected to triage in response to information provided to the ECC/PSC. Please do not respond through this encounter as the response is not directed to a shared pool.     Reason for Disposition   [1] Thigh, calf, or ankle swelling AND [2] only 1 side    Protocols used: ANKLE SWELLING-ADULT-AH

## 2022-07-12 ENCOUNTER — TELEPHONE (OUTPATIENT)
Dept: FAMILY MEDICINE CLINIC | Age: 67
End: 2022-07-12

## 2022-07-13 ENCOUNTER — TELEPHONE (OUTPATIENT)
Dept: FAMILY MEDICINE CLINIC | Age: 67
End: 2022-07-13

## 2022-07-13 NOTE — TELEPHONE ENCOUNTER
----- Message from Derrek Martinez sent at 7/11/2022 12:59 PM EDT -----  Subject: Appointment Request    Reason for Call: Established Patient Appointment needed: Immediate Return   from RN Triage    QUESTIONS    Reason for appointment request? No appointments available during search     Additional Information for Provider? Patient was a return from nurse   triage for swelling in her right ankle and foot that has gotten worse. She   also has a knot under her right big toe. She was dispositioned to be seen   in office today within 4 hours. There was no availablity to schedule. Patient stated she would be waiting for a call back from the office to get   her seen. She was advised to seek care at the walk in clinic but patient   wanted to wait to see if she can be seen in office.  Please advise.  ---------------------------------------------------------------------------  --------------  King Moura [facility]  [phone]; OK to leave message on voicemail  ---------------------------------------------------------------------------  --------------  SCRIPT ANSWERS  COVID Screen: Araceli Cartagena

## 2022-07-16 NOTE — TELEPHONE ENCOUNTER
Given the swelling in her feet I would want patient to be evaluated sooner.   Please see if patient can be seen by another provider or our walk-in clinic

## 2022-07-19 NOTE — TELEPHONE ENCOUNTER
Pt states she would rather be seen by Dr Jose aRmon Winston.  No sooner appointment available at this time

## 2022-07-26 DIAGNOSIS — M54.16 LUMBAR RADICULOPATHY: ICD-10-CM

## 2022-07-26 RX ORDER — GABAPENTIN 300 MG/1
300 CAPSULE ORAL 3 TIMES DAILY
Qty: 90 CAPSULE | Refills: 0 | Status: SHIPPED | OUTPATIENT
Start: 2022-07-26 | End: 2022-08-29

## 2022-07-26 NOTE — TELEPHONE ENCOUNTER
Comments:     Last Office Visit (last PCP visit):   5/4/2022    Next Visit Date:  Future Appointments   Date Time Provider Dawna Biggs   7/28/2022 11:30 AM Deana Borrego DO OB/GYN [address] [address]   8/1/2022 10:15 AM [address], MD [address]   11/2/2022  8:45 AM [address] [place] [address] MD Neeta Moses 94   11/15/2022  9:15 AM Miles Trevizo MD [facility]   2/10/2023  1:15 PM Jimmy Smalls MD [address] [place] [address]       **If hasn't been seen in over a year OR hasn't followed up according to last diabetes/ADHD visit, make appointment for patient before sending refill to provider.     Rx requested:  Requested Prescriptions     Pending Prescriptions Disp Refills    gabapentin (NEURONTIN) 300 MG capsule [Pharmacy Med Name: Gabapentin 300 MG Oral Capsule] 90 capsule 0     Sig: TAKE 1 CAPSULE BY MOUTH THREE TIMES DAILY FOR 30 DAYS

## 2022-07-28 ENCOUNTER — OFFICE VISIT (OUTPATIENT)
Dept: OBGYN CLINIC | Age: 67
End: 2022-07-28
Payer: MEDICARE

## 2022-07-28 ENCOUNTER — HOSPITAL ENCOUNTER (OUTPATIENT)
Age: 67
Setting detail: SPECIMEN
Discharge: HOME | End: 2022-07-28
Payer: MEDICARE

## 2022-07-28 VITALS
BODY MASS INDEX: 34.69 KG/M2 | WEIGHT: 221 LBS | HEIGHT: 67 IN | SYSTOLIC BLOOD PRESSURE: 120 MMHG | DIASTOLIC BLOOD PRESSURE: 72 MMHG

## 2022-07-28 DIAGNOSIS — Z11.51 SCREENING FOR HUMAN PAPILLOMAVIRUS: ICD-10-CM

## 2022-07-28 DIAGNOSIS — Z01.419 ENCOUNTER FOR WELL WOMAN EXAM WITH ROUTINE GYNECOLOGICAL EXAM: Primary | ICD-10-CM

## 2022-07-28 DIAGNOSIS — N89.8 GRANULATION TISSUE OF VAGINAL CUFF: ICD-10-CM

## 2022-07-28 PROCEDURE — 99397 PER PM REEVAL EST PAT 65+ YR: CPT | Performed by: OBSTETRICS & GYNECOLOGY

## 2022-07-28 PROCEDURE — 88342 IMHCHEM/IMCYTCHM 1ST ANTB: CPT

## 2022-07-28 PROCEDURE — 88305 TISSUE EXAM BY PATHOLOGIST: CPT

## 2022-07-28 PROCEDURE — 88341 IMHCHEM/IMCYTCHM EA ADD ANTB: CPT

## 2022-07-28 PROCEDURE — 1123F ACP DISCUSS/DSCN MKR DOCD: CPT | Performed by: OBSTETRICS & GYNECOLOGY

## 2022-07-28 NOTE — PROGRESS NOTES
Postmenopausal Annual     [facility], [employer] is a 77y.o. year old   female who presents today for her annual well woman exam.  The patient is not sexually active. The patient has never been taking hormone replacement therapy. Patient reports spotting again. Patient is a endometrial cancer survivor greater than 10 yrs ago. Had similar complaint 2 yrs ago pap of cuff was negative but friable. She states after recent constipation she had some spotting.      The patient has regular exercise: no    Vitals:  /72   Ht 5' 7\" (1.702 m)   Wt 221 lb (100.2 kg)   LMP  (LMP Unknown)   BMI 34.61 kg/m²   Allergies:  Ciprofloxacin  Past Medical History:   Diagnosis Date    Allergic rhinitis     Asthma     Chest pain, unspecified 2018    Endometrial cancer, grade I ([facility] [address] [facility]     [facility]    Hypertension     Kidney problem     blockage in urethra and urine backs up has to get urethra dilated, has scar tissue    Type 2 diabetes mellitus with chronic kidney disease, without long-term current use of insulin ([facility] [address] 2019     Past Surgical History:   Procedure Laterality Date    CATARACT REMOVAL Left 2020    CHOLECYSTECTOMY      HYSTERECTOMY (CERVIX STATUS UNKNOWN)      endometrial cancer stage !a    URETHRAL STRICTURE DILATATION      Dr. Steven Scruggs      Family History   Problem Relation Age of Onset    Rheum Arthritis Mother     Osteoporosis Mother     No Known Problems Father         complications afte gallbladder surgery    Osteoarthritis Sister     Coronary Art Dis Brother     Heart Surgery Brother     Osteoarthritis Sister      Social History     Socioeconomic History    Marital status:      Spouse name: Not on file    Number of children: Not on file    Years of education: Not on file    Highest education level: Not on file   Occupational History    Not on file   Tobacco Use    Smoking status: Never    Smokeless tobacco: Never   Substance and Sexual Activity    Alcohol use: No    Drug use: No    Sexual activity: Not on file   Other Topics Concern    Not on file   Social History Narrative    Not on file     Social Determinants of Health     Financial Resource Strain: Low Risk     Difficulty of Paying Living Expenses: Not hard at all   Food Insecurity: No Food Insecurity    Worried About Running Out of Food in the Last Year: Never true    [place] of Food in the Last Year: Never true   Transportation Needs: Not on file   Physical Activity: Insufficiently Active    Days of Exercise per Week: 3 days    Minutes of Exercise per Session: 10 min   Stress: Not on file   Social Connections: Not on file   Intimate Partner Violence: Not on file   Housing Stability: Not on file       Last mammogram 2022  Breast cancer risk factors : no known risk  Last Pap 2020 wnl    No LMP recorded (lmp unknown). Patient has had a hysterectomy. Age at menopause onset: 48  Menopausal symptom assessment: none  Urinary incontinence & GUsymptoms: none  Sexual dysfunction: none  Present Hormonal medications: none    Osteoporosis risk assessment : menopause  Last bone mineral density : 2yrs    History of abnormal lipids:yes -   Hypertension yes  Stroke/MI no    Yearly flu vaccine recommended for persons aged 48 and older. Colonoscopyup-to-date    Review of Systems  Review of Systems   Constitutional:  Negative for activity change, appetite change, fatigue and unexpected weight change. HENT:  Negative for nosebleeds and sore throat. Eyes:  Negative for visual disturbance. Respiratory:  Negative for cough, shortness of breath and wheezing. Cardiovascular:  Negative for chest pain, palpitations and leg swelling. Gastrointestinal:  Negative for abdominal distention, abdominal pain, blood in stool, constipation, diarrhea, nausea and vomiting. Endocrine: Negative for cold intolerance, heat intolerance, polydipsia and polyuria.    Genitourinary:  Positive for vaginal bleeding (recent episode of spotting). Negative for difficulty urinating, dyspareunia, dysuria, frequency, genital sores, hematuria, pelvic pain, urgency, vaginal discharge and vaginal pain. Musculoskeletal:  Negative for arthralgias. Skin:  Negative for rash. Neurological:  Negative for dizziness, weakness, light-headedness and headaches. Hematological:  Negative for adenopathy. Does not bruise/bleed easily. Psychiatric/Behavioral:  Negative for confusion and sleep disturbance. All other systems reviewed and are negative. Objective:     Vitals:  /72   Ht 5' 7\" (1.702 m)   Wt 221 lb (100.2 kg)   LMP  (LMP Unknown)   BMI 34.61 kg/m²     Physical Exam  Physical Exam  Constitutional:       Appearance: She is well-developed. Eyes:      Pupils: Pupils are equal, round, and reactive to light. Cardiovascular:      Rate and Rhythm: Normal rate and regular rhythm. Heart sounds: Normal heart sounds. Pulmonary:      Effort: Pulmonary effort is normal.   Abdominal:      General: Bowel sounds are normal.      Palpations: Abdomen is soft. Genitourinary:     Labia:         Right: No rash, tenderness or lesion. Left: No rash, tenderness or lesion. Vagina: Bleeding and lesions present. Adnexa:         Right: No mass, tenderness or fullness. Left: No mass, tenderness or fullness. Comments: Papillary projections noted at the vaginal cuff again will obtain a sample of this tissue  Neurological:      Mental Status: She is alert and oriented to person, place, and time. Assessment:      Diagnosis Orders   1. Encounter for well woman exam with routine gynecological exam        2. Screening for human papillomavirus        3. Granulation tissue of vaginal cuff  Surgical Pathology          Body mass index is 34.61 kg/m².   Obesity:  Class III  Smoking:  No    Plan:   Pap : not-indicated hysterectomy cervix removed  Pathology sent from the vaginal cuff    Obesity Counseling:

## 2022-07-29 ASSESSMENT — ENCOUNTER SYMPTOMS
CONSTIPATION: 0
DIARRHEA: 0
SORE THROAT: 0
WHEEZING: 0
COUGH: 0
NAUSEA: 0
ABDOMINAL PAIN: 0
ABDOMINAL DISTENTION: 0
BLOOD IN STOOL: 0
SHORTNESS OF BREATH: 0
VOMITING: 0

## 2022-08-01 ENCOUNTER — HOSPITAL ENCOUNTER (OUTPATIENT)
Age: 67
Discharge: HOME | End: 2022-08-03
Payer: MEDICARE

## 2022-08-01 ENCOUNTER — OFFICE VISIT (OUTPATIENT)
Dept: FAMILY MEDICINE CLINIC | Age: 67
End: 2022-08-01
Payer: MEDICARE

## 2022-08-01 ENCOUNTER — HOSPITAL ENCOUNTER (OUTPATIENT)
Dept: GENERAL RADIOLOGY | Age: 67
Discharge: HOME | End: 2022-08-03
Payer: MEDICARE

## 2022-08-01 VITALS
WEIGHT: 221 LBS | TEMPERATURE: 97.7 F | BODY MASS INDEX: 34.61 KG/M2 | SYSTOLIC BLOOD PRESSURE: 112 MMHG | OXYGEN SATURATION: 99 % | DIASTOLIC BLOOD PRESSURE: 70 MMHG | HEART RATE: 55 BPM

## 2022-08-01 DIAGNOSIS — E11.22 TYPE 2 DIABETES MELLITUS WITH STAGE 3A CHRONIC KIDNEY DISEASE, WITHOUT LONG-TERM CURRENT USE OF INSULIN (HCC): ICD-10-CM

## 2022-08-01 DIAGNOSIS — N18.31 TYPE 2 DIABETES MELLITUS WITH STAGE 3A CHRONIC KIDNEY DISEASE, WITHOUT LONG-TERM CURRENT USE OF INSULIN (HCC): ICD-10-CM

## 2022-08-01 DIAGNOSIS — M79.672 LEFT FOOT PAIN: Primary | ICD-10-CM

## 2022-08-01 DIAGNOSIS — I83.893 VARICOSE VEINS OF BOTH LEGS WITH EDEMA: ICD-10-CM

## 2022-08-01 DIAGNOSIS — M79.672 LEFT FOOT PAIN: ICD-10-CM

## 2022-08-01 PROCEDURE — 99214 OFFICE O/P EST MOD 30 MIN: CPT | Performed by: FAMILY MEDICINE

## 2022-08-01 PROCEDURE — 3051F HG A1C>EQUAL 7.0%<8.0%: CPT | Performed by: FAMILY MEDICINE

## 2022-08-01 PROCEDURE — 73630 X-RAY EXAM OF FOOT: CPT

## 2022-08-01 PROCEDURE — 1123F ACP DISCUSS/DSCN MKR DOCD: CPT | Performed by: FAMILY MEDICINE

## 2022-08-01 RX ORDER — GLIPIZIDE 2.5 MG/1
TABLET, EXTENDED RELEASE ORAL
Qty: 90 TABLET | Refills: 0 | Status: SHIPPED | OUTPATIENT
Start: 2022-08-01 | End: 2022-10-11 | Stop reason: SDUPTHER

## 2022-08-01 RX ORDER — ATORVASTATIN CALCIUM 10 MG/1
TABLET, FILM COATED ORAL
Qty: 90 TABLET | Refills: 0 | Status: SHIPPED | OUTPATIENT
Start: 2022-08-01 | End: 2022-11-03

## 2022-08-01 ASSESSMENT — ENCOUNTER SYMPTOMS
APNEA: 0
DIARRHEA: 0
ABDOMINAL PAIN: 0
NAUSEA: 0
CONSTIPATION: 0
SHORTNESS OF BREATH: 0
COUGH: 0
BLOOD IN STOOL: 0
CHEST TIGHTNESS: 0
VOMITING: 0

## 2022-08-01 NOTE — PROGRESS NOTES
Subjective:      Patient ID: Ruby Barakat is a 77 y.o. female who presents today for:     Chief Complaint   Patient presents with    Foot Swelling     X2-3 weeks        HPI  Patient is a very pleasant 78-year-old female with a history of diabetes. She states that she is doing well. Last hemoglobin A1c was 7.0. Patient is adherent to her medication and denies any polyuria or polydipsia. Additionally, patient has been experiencing worseing left foot swelling. The swelling is beneath her great toe and painful to walk on. She denies any inciting injury or trauma. She states that when she steps she can see the swelling push out to the medial aspect of her toe. Patient also has a history of varicose veins that swell and become tender.   The symptoms also wax and wane  Past Medical History:   Diagnosis Date    Allergic rhinitis     Asthma     Chest pain, unspecified 8/20/2018    Endometrial cancer, grade I ([facility] [address] 2011    [facility]    Hypertension     Kidney problem     blockage in urethra and urine backs up has to get urethra dilated, has scar tissue    Type 2 diabetes mellitus with chronic kidney disease, without long-term current use of insulin ([address] 6/21/2019     Past Surgical History:   Procedure Laterality Date    CATARACT REMOVAL Left 11/2020    CHOLECYSTECTOMY  1990    HYSTERECTOMY (CERVIX STATUS UNKNOWN)  2011    endometrial cancer stage !a    URETHRAL STRICTURE DILATATION  2016    Dr. Aga Shipman      Family History   Problem Relation Age of Onset    Rheum Arthritis Mother     Osteoporosis Mother     No Known Problems Father         complications afte gallbladder surgery    Osteoarthritis Sister     Coronary Art Dis Brother     Heart Surgery Brother     Osteoarthritis Sister      Social History     Socioeconomic History    Marital status:      Spouse name: Not on file    Number of children: Not on file    Years of education: Not on file    Highest education level: Not on file Occupational History    Not on file   Tobacco Use    Smoking status: Never    Smokeless tobacco: Never   Substance and Sexual Activity    Alcohol use: No    Drug use: No    Sexual activity: Not on file   Other Topics Concern    Not on file   Social History Narrative    Not on file     Social Determinants of Health     Financial Resource Strain: Low Risk     Difficulty of Paying Living Expenses: Not hard at all   Food Insecurity: No Food Insecurity    Worried About Running Out of Food in the Last Year: Never true    Ran Out of Food in the Last Year: Never true   Transportation Needs: Not on file   Physical Activity: Insufficiently Active    Days of Exercise per Week: 3 days    Minutes of Exercise per Session: 10 min   Stress: Not on file   Social Connections: Not on file   Intimate Partner Violence: Not on file   Housing Stability: Not on file     Current Outpatient Medications on File Prior to Visit   Medication Sig Dispense Refill    gabapentin (NEURONTIN) 300 MG capsule Take 1 capsule by mouth in the morning and 1 capsule at noon and 1 capsule before bedtime. Do all this for 30 days. 90 capsule 0    Handicap Placard MISC by Does not apply route Expires: 7/9/2027 1 each 0    Lancets (ONETOUCH DELICA PLUS [id number]) MISC USE 1  TO CHECK GLUCOSE THREE TIMES DAILY 100 each 5    ONETOUCH ULTRA strip USE 1 STRIP TO CHECK GLUCOSE THREE TIMES DAILY 100 each 5    amLODIPine (NORVASC) 10 MG tablet Take 1 tablet by mouth once daily 90 tablet 1    metoprolol succinate (TOPROL XL) 25 MG extended release tablet Take 1 tablet by mouth daily 90 tablet 1    tamsulosin (FLOMAX) 0.4 MG capsule Take 1 capsule by mouth daily 90 capsule 3    nystatin (MYCOSTATIN) 472253 UNIT/GM cream Apply topically 2 times daily. 1 Tube 1    blood glucose monitor kit and supplies DX: diabetes mellitus.  Test 3 time(s) daily - Ok to substitute per insurance 1 kit 1    VITAMIN D PO Take by mouth      CRANBERRY PO Take by mouth      Alcohol Swabs PADS DX: diabetes mellitus. Test 1-3 time(s) daily - Ok to substitute per insurance (1 each = 1 box) 1 each 5    aspirin 81 MG tablet Take 81 mg by mouth daily       No current facility-administered medications on file prior to visit. Allergies:  Ciprofloxacin    Review of Systems   Constitutional:  Negative for activity change, appetite change and fatigue. Respiratory:  Negative for apnea, cough, chest tightness and shortness of breath. Cardiovascular:  Negative for chest pain, palpitations and leg swelling. Gastrointestinal:  Negative for abdominal pain, blood in stool, constipation, diarrhea, nausea and vomiting. Musculoskeletal:  Negative for arthralgias. Neurological:  Negative for seizures and headaches. Psychiatric/Behavioral:  Negative for hallucinations and suicidal ideas. Objective:   /70   Pulse 55   Temp 97.7 °F (36.5 °C) (Infrared)   Wt 221 lb (100.2 kg)   LMP  (LMP Unknown)   SpO2 99%   BMI 34.61 kg/m²     Physical Exam  Vitals and nursing note reviewed. Constitutional:       General: She is not in acute distress. Appearance: Normal appearance. She is well-developed. She is not diaphoretic. HENT:      Head: Normocephalic and atraumatic. Nose: Nose normal.      Mouth/Throat:      Mouth: Mucous membranes are moist.      Pharynx: Oropharynx is clear. Eyes:      Conjunctiva/sclera: Conjunctivae normal.      Pupils: Pupils are equal, round, and reactive to light. Cardiovascular:      Rate and Rhythm: Normal rate and regular rhythm. Heart sounds: Normal heart sounds. No murmur heard. No friction rub. No gallop. Pulmonary:      Effort: Pulmonary effort is normal. No respiratory distress. Breath sounds: Normal breath sounds. No wheezing or rales. Chest:      Chest wall: No tenderness. Abdominal:      General: Abdomen is flat. Bowel sounds are normal.      Palpations: Abdomen is soft. Tenderness: There is no abdominal tenderness. Musculoskeletal:      Cervical back: Normal range of motion. Legs:         Feet:    Feet:      Right foot:      Protective Sensation: 10 sites tested. 10 sites sensed. Left foot:      Protective Sensation: 10 sites tested. 10 sites sensed. Skin:     General: Skin is warm and dry. Neurological:      Mental Status: She is alert and oriented to person, place, and time. Psychiatric:         Behavior: Behavior normal.         Thought Content: Thought content normal.         Judgment: Judgment normal.       Assessment & Plan:     1. Type 2 diabetes mellitus with stage 3a chronic kidney disease, without long-term current use of insulin ([facility])  Controlled: Continue current medication  - atorvastatin (LIPITOR) 10 MG tablet; Take 1 tablet by mouth once daily  Dispense: 90 tablet; Refill: 0  - glipiZIDE (GLUCOTROL XL) 2.5 MG extended release tablet; Take 1 tablet by mouth once daily  Dispense: 90 tablet; Refill: 0    2. Left foot pain  Obtain x-ray to rule out fracture and will refer to podiatry for further intervention  - Amb External Referral To Podiatry  - XR FOOT LEFT (MIN 3 VIEWS); Future    3. Varicose veins of both legs with edema  Given chronicity will refer to vein specialist  - Amb External Referral To Vascular Surgery      Return in about 3 months (around 11/1/2022) for F/u DM.     Aurbey Rojas MD

## 2022-08-10 ENCOUNTER — ANESTHESIA EVENT (OUTPATIENT)
Dept: OPERATING ROOM | Age: 67
End: 2022-08-10
Payer: MEDICARE

## 2022-08-10 ENCOUNTER — ANESTHESIA (OUTPATIENT)
Dept: OPERATING ROOM | Age: 67
End: 2022-08-10
Payer: MEDICARE

## 2022-08-10 ENCOUNTER — HOSPITAL ENCOUNTER (OUTPATIENT)
Age: 67
Setting detail: OUTPATIENT SURGERY
Discharge: HOME | End: 2022-08-10
Attending: SPECIALIST | Admitting: SPECIALIST
Payer: MEDICARE

## 2022-08-10 VITALS
OXYGEN SATURATION: 96 % | DIASTOLIC BLOOD PRESSURE: 64 MMHG | BODY MASS INDEX: 34.21 KG/M2 | RESPIRATION RATE: 18 BRPM | TEMPERATURE: 97.6 F | WEIGHT: 218 LBS | SYSTOLIC BLOOD PRESSURE: 114 MMHG | HEART RATE: 77 BPM | HEIGHT: 67 IN

## 2022-08-10 DIAGNOSIS — C54.1 RECURRENT CARCINOMA OF ENDOMETRIUM (HCC): Primary | ICD-10-CM

## 2022-08-10 DIAGNOSIS — Z90.710 S/P HYSTERECTOMY: ICD-10-CM

## 2022-08-10 PROBLEM — Z12.11 SCREENING FOR MALIGNANT NEOPLASM OF COLON: Status: ACTIVE | Noted: 2022-08-10

## 2022-08-10 LAB
GLUCOSE BLD-MCNC: 148 MG/DL (ref 70–99)
PERFORMED ON: ABNORMAL

## 2022-08-10 PROCEDURE — G0121 COLON CA SCRN NOT HI RSK IND: HCPCS | Performed by: SPECIALIST

## 2022-08-10 PROCEDURE — 7100000011 HC PHASE II RECOVERY - ADDTL 15 MIN: Performed by: SPECIALIST

## 2022-08-10 PROCEDURE — 3700000000 HC ANESTHESIA ATTENDED CARE: Performed by: SPECIALIST

## 2022-08-10 PROCEDURE — 2500000003 HC RX 250 WO HCPCS: Performed by: NURSE ANESTHETIST, CERTIFIED REGISTERED

## 2022-08-10 PROCEDURE — 6360000002 HC RX W HCPCS: Performed by: NURSE ANESTHETIST, CERTIFIED REGISTERED

## 2022-08-10 PROCEDURE — 3609027000 HC COLONOSCOPY: Performed by: SPECIALIST

## 2022-08-10 PROCEDURE — 3700000001 HC ADD 15 MINUTES (ANESTHESIA): Performed by: SPECIALIST

## 2022-08-10 PROCEDURE — 2580000003 HC RX 258: Performed by: SPECIALIST

## 2022-08-10 PROCEDURE — 7100000010 HC PHASE II RECOVERY - FIRST 15 MIN: Performed by: SPECIALIST

## 2022-08-10 PROCEDURE — 2709999900 HC NON-CHARGEABLE SUPPLY: Performed by: SPECIALIST

## 2022-08-10 RX ORDER — PROPOFOL 10 MG/ML
INJECTION, EMULSION INTRAVENOUS PRN
Status: DISCONTINUED | OUTPATIENT
Start: 2022-08-10 | End: 2022-08-10 | Stop reason: SDUPTHER

## 2022-08-10 RX ORDER — SODIUM CHLORIDE 0.9 % (FLUSH) 0.9 %
5-40 SYRINGE (ML) INJECTION EVERY 12 HOURS SCHEDULED
Status: DISCONTINUED | OUTPATIENT
Start: 2022-08-10 | End: 2022-08-10 | Stop reason: HOSPADM

## 2022-08-10 RX ORDER — SODIUM CHLORIDE, SODIUM LACTATE, POTASSIUM CHLORIDE, CALCIUM CHLORIDE 600; 310; 30; 20 MG/100ML; MG/100ML; MG/100ML; MG/100ML
INJECTION, SOLUTION INTRAVENOUS CONTINUOUS
Status: DISCONTINUED | OUTPATIENT
Start: 2022-08-10 | End: 2022-08-10 | Stop reason: HOSPADM

## 2022-08-10 RX ORDER — LIDOCAINE HYDROCHLORIDE 10 MG/ML
INJECTION, SOLUTION INFILTRATION; PERINEURAL PRN
Status: DISCONTINUED | OUTPATIENT
Start: 2022-08-10 | End: 2022-08-10 | Stop reason: SDUPTHER

## 2022-08-10 RX ORDER — GLYCOPYRROLATE 1 MG/5 ML
SYRINGE (ML) INTRAVENOUS PRN
Status: DISCONTINUED | OUTPATIENT
Start: 2022-08-10 | End: 2022-08-10 | Stop reason: SDUPTHER

## 2022-08-10 RX ORDER — SIMETHICONE 20 MG/.3ML
EMULSION ORAL PRN
Status: DISCONTINUED | OUTPATIENT
Start: 2022-08-10 | End: 2022-08-10 | Stop reason: ALTCHOICE

## 2022-08-10 RX ORDER — SODIUM CHLORIDE 9 MG/ML
INJECTION, SOLUTION INTRAVENOUS PRN
Status: DISCONTINUED | OUTPATIENT
Start: 2022-08-10 | End: 2022-08-10 | Stop reason: HOSPADM

## 2022-08-10 RX ORDER — MAGNESIUM HYDROXIDE 1200 MG/15ML
LIQUID ORAL PRN
Status: DISCONTINUED | OUTPATIENT
Start: 2022-08-10 | End: 2022-08-10 | Stop reason: ALTCHOICE

## 2022-08-10 RX ORDER — SODIUM CHLORIDE 0.9 % (FLUSH) 0.9 %
5-40 SYRINGE (ML) INJECTION PRN
Status: DISCONTINUED | OUTPATIENT
Start: 2022-08-10 | End: 2022-08-10 | Stop reason: HOSPADM

## 2022-08-10 RX ADMIN — LIDOCAINE HYDROCHLORIDE 30 MG: 10 INJECTION, SOLUTION INFILTRATION; PERINEURAL at 08:35

## 2022-08-10 RX ADMIN — SODIUM CHLORIDE, POTASSIUM CHLORIDE, SODIUM LACTATE AND CALCIUM CHLORIDE: 600; 310; 30; 20 INJECTION, SOLUTION INTRAVENOUS at 08:05

## 2022-08-10 RX ADMIN — PROPOFOL 300 MG: 10 INJECTION, EMULSION INTRAVENOUS at 08:35

## 2022-08-10 RX ADMIN — Medication 0.2 MG: at 08:35

## 2022-08-10 NOTE — DISCHARGE INSTRUCTIONS
Lower Discharge Instructions    Patient Name: Roya Mckeon  Patient ID: [id number]  YOB: 1955  Procedure: Cathie Lamas  Referring Physician: [unfilled]  Procedure Date: 8/10/2022    Recommendations:  []   Follow-up appointment with family physician in 4 weeks. [x]   Colonoscopy recommended in 10 years. []   Follow-up appointment with endoscopist in  Reports of your procedure and these recommendations have been sent to:  [unfilled]    Sedative medication given for procedures can slow your reaction time and coordination for many hours. If you receive medications, it is important for your safety to follow the instructions below for the remainder of the day:  BE TAKEN directly home from the center and rest quietly. DO NOT resume normal activities until tomorrow. Do NOT drive, return to work, or operate any machinery or power tools. Do NOT make any important personal or business decisions, sign any legal papers or perform any activity that depends on your full concentration power or mental judgement. Do NOT drink any alcohol or take nerve or sleeping drugs. They add to the effects of the medicine still present in your body.    [] (Checked if a biopsy or polyp removal was performed)  There is a slight risk of bleeding. If you had a biopsy or a polyp removed, we suggest that you follow the instructions below:  Do NOT take aspirin or similar anti-inflammatory medicine for ___ days. Do NOT exercise, jog, or do any heavy lifting or straining for 1 day. Potential common after effects and treatment following the procedure:  Mild abdominal pain, bloating, or excessive gas - rest, eat lightly, and use a heating pad. Redness and/or swelling where the IV was - apply heat and elevate. Symptoms to report to your physician:  Severe abdominal pain or bloating. Chills or fever above 101 degrees occurring within 24 hours after procedure. Large amounts of rectal bleeding that does not stop.  Small amount of rectal bleeding is not serious especially if hemorrhoids are present. Unable to keep down food or drink. IV site stays red and swollen for more than 2 days. In the case of an emergency, please go to the emergency room. If you are not having an emergency but are having some of the above symptoms please call the doctor's office at:  Dr. Jessica Gudino [phone]   @[email or web address]@      I have read and understand the above instructions:            ____________________________         ____________________________  Patient or Patient Rep. Signature   Witness Signature      Date: 8/10/2022  Time:

## 2022-08-10 NOTE — PROGRESS NOTES
Patient ID:  Susy German  [id number]  63 y.o.  1955  Patient received in PACU BED 2 Report received from Surgical Nurse. Attached to Monitor, VSS, See Nursing Assessment and Flowsheets for detailed Information  Awake, following commands, answering questions appropriately. O2 Sats>92 on Room air  Taking po fluids well, VSS. IV Discontinued per protocol, catheter intact, patient tolerated well. Discharge Instructions given, patient verbalized understanding.   Pt to be discharged to home with responsible adult     Electronically signed by Basilio Soto RN on 8/10/22

## 2022-08-10 NOTE — H&P
Patient Name: Lupe Marina  :   MRN: [id number]  DATE: 08/10/22      ENDOSCOPY  History and Physical    Procedure:    [] Diagnostic Colonoscopy       [x] Screening Colonoscopy  [] EGD      [] ERCP      [] EUS       [] Other    [x] Previous office notes/History and Physical reviewed from the patients chart. Please see EMR for further details of HPI. I have examined the patient's status immediately prior to the procedure and:      Indications/HPI:    []Abdominal Pain  []Cancer- GI/Lung  []Fhx of colon CA/polyps  []History of Polyps  []Baldwins   []Melena  []Abnormal Imaging  []Dysphagia    []Persistent Pneumonia  []Anemia  []Food Impaction  []History of Polyps  []GI Bleed  []Pulmonary nodule/Mass  []Change in bowel habits []Heartburn/Reflux  []Rectal Bleed (BRBPR)  []Chest Pain - Non Cardiac []Heme (+) Stoo  l[]Ulcers  []Constipation  []Hemoptysis   []Varices  []Diarrhea  []Hypoxemia  []Nausea/Vomiting  []Screening   []Crohns/Colitis  []Other:     Anesthesia:   [x] MAC [] Moderate Sedation   [] General   [] None     ROS: 12 pt Review of Symptoms was negative unless mentioned above    Medications:   Prior to Admission medications    Medication Sig Start Date End Date Taking? Authorizing Provider   atorvastatin (LIPITOR) 10 MG tablet Take 1 tablet by mouth once daily 22   Foster Angelucci, MD   glipiZIDE (GLUCOTROL XL) 2.5 MG extended release tablet Take 1 tablet by mouth once daily 22   Foster Angelucci, MD   gabapentin (NEURONTIN) 300 MG capsule Take 1 capsule by mouth in the morning and 1 capsule at noon and 1 capsule before bedtime. Do all this for 30 days.  22  MD Irene Sosa MISC by Does not apply route Expires: 2027   Foster Angelucci, MD   [facility] PLUS [id number]) MISC USE 1  TO CHECK GLUCOSE THREE TIMES DAILY 22   Foster Angelucci, MD   ONETOUCH ULTRA strip USE 1 STRIP TO CHECK GLUCOSE THREE TIMES DAILY 22   Foster Angelucci, MD amLODIPine (NORVASC) 10 MG tablet Take 1 tablet by mouth once daily 2/11/22   Erica Moran MD   metoprolol succinate (TOPROL XL) 25 MG extended release tablet Take 1 tablet by mouth daily 2/11/22   Erica Moran MD   tamsulosin [facility]) 0.4 MG capsule Take 1 capsule by mouth daily 11/17/20   Ramirez Blevins MD   nystatin (MYCOSTATIN) 230780 UNIT/GM cream Apply topically 2 times daily. 9/18/20   Ya Navarro DO   blood glucose monitor kit and supplies DX: diabetes mellitus. Test 3 time(s) daily - Ok to substitute per insurance 8/12/20   Sim Yen MD   VITAMIN D PO Take by mouth    Historical Provider, MD   CRANBERRY PO Take by mouth    Historical Provider, MD   Alcohol Swabs PADS DX: diabetes mellitus. Test 1-3 time(s) daily - Ok to substitute per insurance (1 each = 1 box) 6/13/19   Sim Yen MD   aspirin 81 MG tablet Take 81 mg by mouth daily    Historical Provider, MD       Allergies:    Allergies   Allergen Reactions    Ciprofloxacin         History of allergic reaction to anesthesia:  No    Past Medical History:  Past Medical History:   Diagnosis Date    Allergic rhinitis     Asthma     Chest pain, unspecified 8/20/2018    Endometrial cancer, grade I ([address] 2011    [facility]    Hypertension     Kidney problem     blockage in urethra and urine backs up has to get urethra dilated, has scar tissue    Type 2 diabetes mellitus with chronic kidney disease, without long-term current use of insulin ([address] 6/21/2019       Past Surgical History:  Past Surgical History:   Procedure Laterality Date    CATARACT REMOVAL Left 11/2020    CHOLECYSTECTOMY  1990    HYSTERECTOMY (CERVIX STATUS UNKNOWN)  2011    endometrial cancer stage !a    URETHRAL STRICTURE DILATATION  2016    Dr. Breezy Keita        Social History:  Social History     Tobacco Use    Smoking status: Never    Smokeless tobacco: Never   Substance Use Topics    Alcohol use: No    Drug use: No       Vital Signs:   Vitals:    08/10/22 0801   BP: 135/83   Pulse: (!) 49   Resp: 16   Temp: 97.6 °F (36.4 °C)   SpO2: 97%        Physical Exam:  Cardiac:  [x]WNL  []Comments:  Pulmonary:  [x]WNL   []Comments:   Neuro/Mental Status:  [x]WNL  []Comments:  Abdominal:  [x]WNL    []Comments:  Other:   []WNL  []Comments:    Informed Consent:  The risks and benefits of the procedure have been discussed with either the patient or if they cannot consent, their representative. Assessment:  Patient examined and appropriate for planned sedation and procedure. Plan:  Proceed with planned sedation and procedure as above.     Que Hernandez MD  8:33 AM

## 2022-08-10 NOTE — ANESTHESIA POSTPROCEDURE EVALUATION
Department of Anesthesiology  Postprocedure Note    Patient: Araceli Castaneda  MRN: [id number]  YOB: 1955  Date of evaluation: 8/10/2022      Procedure Summary     Date: 08/10/22 Room / Location: [address] [place] [address]    Anesthesia Start: 9694 Anesthesia Stop:     Procedure: COLORECTAL CANCER SCREENING, NOT HIGH RISK (Rectum) Diagnosis: (Colon cancer screening Z12.11)    Surgeons: Yuval Enrique MD Responsible Provider: VIKTORIA Gomez CRNA    Anesthesia Type: MAC ASA Status: 3          Anesthesia Type: No value filed.     Mikey Phase I: Mikey Score: 10    Mikey Phase II:        Anesthesia Post Evaluation    Patient location during evaluation: bedside  Patient participation: complete - patient participated  Level of consciousness: awake and awake and alert  Pain score: 0  Airway patency: patent  Nausea & Vomiting: no nausea and no vomiting  Complications: no  Cardiovascular status: blood pressure returned to baseline and hemodynamically stable  Respiratory status: acceptable and spontaneous ventilation  Hydration status: euvolemic

## 2022-08-10 NOTE — ANESTHESIA PRE PROCEDURE
Department of Anesthesiology  Preprocedure Note       Name:  Minnie Jimenez   Age:  77 y.o.  :  1955                                          MRN:  [id number]         Date:  8/10/2022      Surgeon: Tulio Boykin):  Carina Mulligan MD    Procedure: Procedure(s):  COLORECTAL CANCER SCREENING, NOT HIGH RISK    Medications prior to admission:   Prior to Admission medications    Medication Sig Start Date End Date Taking? Authorizing Provider   atorvastatin (LIPITOR) 10 MG tablet Take 1 tablet by mouth once daily 22   Jonathan Paniagua MD   glipiZIDE (GLUCOTROL XL) 2.5 MG extended release tablet Take 1 tablet by mouth once daily 22   Jonathan Paniagua MD   gabapentin (NEURONTIN) 300 MG capsule Take 1 capsule by mouth in the morning and 1 capsule at noon and 1 capsule before bedtime. Do all this for 30 days. 22  MD Milena [facility]ap [facility] MISC by Does not apply route Expires: 2027   Jonathan Paniagua MD   [facility] PLUS [id number]) MISC USE 1  TO CHECK GLUCOSE THREE TIMES DAILY 22   Jonathan Paniagua MD   ONETOUCH ULTRA strip USE 1 STRIP TO CHECK GLUCOSE THREE TIMES DAILY 22   Jonathan Paniagua MD   amLODIPine (NORVASC) 10 MG tablet Take 1 tablet by mouth once daily 22   Amanda Hartmann MD   metoprolol succinate (TOPROL XL) 25 MG extended release tablet Take 1 tablet by mouth daily 22   Amanda Hartmann MD   tamsulosin [facility]) 0.4 MG capsule Take 1 capsule by mouth daily 20   Alonso Cuello MD   nystatin (MYCOSTATIN) 842905 UNIT/GM cream Apply topically 2 times daily. 20   Ya Navarro, DO   blood glucose monitor kit and supplies DX: diabetes mellitus. Test 3 time(s) daily - Ok to substitute per insurance 20   Jonathan Paniagua MD   VITAMIN D PO Take by mouth    Historical Provider, MD   CRANBERRY PO Take by mouth    Historical Provider, MD   Alcohol Swabs PADS DX: diabetes mellitus.  Test 1-3 time(s) daily - Ok to substitute per insurance (1 each = 1 box) 6/13/19   Read MD Johan   aspirin 81 MG tablet Take 81 mg by mouth daily    Historical Provider, MD       Current medications:    No current facility-administered medications for this encounter. Current Outpatient Medications   Medication Sig Dispense Refill    atorvastatin (LIPITOR) 10 MG tablet Take 1 tablet by mouth once daily 90 tablet 0    glipiZIDE (GLUCOTROL XL) 2.5 MG extended release tablet Take 1 tablet by mouth once daily 90 tablet 0    gabapentin (NEURONTIN) 300 MG capsule Take 1 capsule by mouth in the morning and 1 capsule at noon and 1 capsule before bedtime. Do all this for 30 days. 90 capsule 0    Handicap Placard MISC by Does not apply route Expires: 7/9/2027 1 each 0    Lancets (ONETOUCH DELICA PLUS [id number]) MISC USE 1  TO CHECK GLUCOSE THREE TIMES DAILY 100 each 5    ONETOUCH ULTRA strip USE 1 STRIP TO CHECK GLUCOSE THREE TIMES DAILY 100 each 5    amLODIPine (NORVASC) 10 MG tablet Take 1 tablet by mouth once daily 90 tablet 1    metoprolol succinate (TOPROL XL) 25 MG extended release tablet Take 1 tablet by mouth daily 90 tablet 1    tamsulosin (FLOMAX) 0.4 MG capsule Take 1 capsule by mouth daily 90 capsule 3    nystatin (MYCOSTATIN) 635864 UNIT/GM cream Apply topically 2 times daily. 1 Tube 1    blood glucose monitor kit and supplies DX: diabetes mellitus. Test 3 time(s) daily - Ok to substitute per insurance 1 kit 1    VITAMIN D PO Take by mouth      CRANBERRY PO Take by mouth      Alcohol Swabs PADS DX: diabetes mellitus. Test 1-3 time(s) daily - Ok to substitute per insurance (1 each = 1 box) 1 each 5    aspirin 81 MG tablet Take 81 mg by mouth daily         Allergies:     Allergies   Allergen Reactions    Ciprofloxacin        Problem List:    Patient Active Problem List   Diagnosis Code    Urethral stricture N35.919    Retention of urine R33.9    Renal insufficiency syndrome N28.9    Renal failure syndrome N19    Renal cyst N28.1    Pancreas cyst K86.2  Osteoarthrosis involving lower leg [id number]    Essential hypertension I10    Asthma J45.909    Allergic rhinitis J30.9    Chest pain, unspecified R07.9    Type 2 diabetes mellitus with chronic kidney disease, without long-term current use of insulin (HCC) E11.22    Arteriosclerosis of coronary artery I25.10    Stage 4 chronic kidney disease (HCC) N18.4       Past Medical History:        Diagnosis Date    Allergic rhinitis     Asthma     Chest pain, unspecified 8/20/2018    Endometrial cancer, grade I ([facility] [address] 2011    [facility]    Hypertension     Kidney problem     blockage in urethra and urine backs up has to get urethra dilated, has scar tissue    Type 2 diabetes mellitus with chronic kidney disease, without long-term current use of insulin ([facility] [address] 6/21/2019       Past Surgical History:        Procedure Laterality Date    CATARACT REMOVAL Left 11/2020    CHOLECYSTECTOMY  1990    HYSTERECTOMY (CERVIX STATUS UNKNOWN)  2011    endometrial cancer stage !a    URETHRAL STRICTURE DILATATION  2016    Dr. Emely Polanco        Social History:    Social History     Tobacco Use    Smoking status: Never    Smokeless tobacco: Never   Substance Use Topics    Alcohol use: No                                Counseling given: Not Answered      Vital Signs (Current): There were no vitals filed for this visit.                                            BP Readings from Last 3 Encounters:   08/01/22 112/70   07/28/22 120/72   05/04/22 130/72       NPO Status:                                                                                 BMI:   Wt Readings from Last 3 Encounters:   08/01/22 221 lb (100.2 kg)   07/28/22 221 lb (100.2 kg)   05/04/22 215 lb (97.5 kg)     There is no height or weight on file to calculate BMI.    CBC:   Lab Results   Component Value Date/Time    WBC 6.5 05/07/2022 07:39 AM    RBC 4.00 05/07/2022 07:39 AM    HGB 12.9 05/07/2022 07:39 AM    HCT 39.4 05/07/2022 07:39 AM    MCV 98.4 05/07/2022 07:39 AM    RDW 12.7 05/07/2022 07:39 AM     05/07/2022 07:39 AM       CMP:   Lab Results   Component Value Date/Time     05/07/2022 07:39 AM    K 4.6 05/07/2022 07:39 AM     05/07/2022 07:39 AM    CO2 23 05/07/2022 07:39 AM    BUN 23 05/07/2022 07:39 AM    CREATININE 1.45 05/07/2022 07:39 AM    GFRAA 43.6 05/07/2022 07:39 AM    LABGLOM 36.0 05/07/2022 07:39 AM    GLUCOSE 185 05/07/2022 07:39 AM    PROT 7.0 05/07/2022 07:39 AM    CALCIUM 9.2 05/07/2022 07:39 AM    BILITOT 0.6 05/07/2022 07:39 AM    ALKPHOS 75 05/07/2022 07:39 AM    AST 14 05/07/2022 07:39 AM    ALT 7 05/07/2022 07:39 AM       POC Tests: No results for input(s): POCGLU, POCNA, POCK, POCCL, POCBUN, POCHEMO, POCHCT in the last 72 hours. Coags: No results found for: PROTIME, INR, APTT    HCG (If Applicable): No results found for: PREGTESTUR, PREGSERUM, HCG, HCGQUANT     ABGs: No results found for: PHART, PO2ART, BPX7KMH, IEQ2MDQ, BEART, O7CLDCSC     Type & Screen (If Applicable):  No results found for: LABABO, LABRH    Drug/Infectious Status (If Applicable):  No results found for: HIV, HEPCAB    COVID-19 Screening (If Applicable): No results found for: COVID19        Anesthesia Evaluation  Patient summary reviewed and Nursing notes reviewed  Airway: Mallampati: II  TM distance: >3 FB   Neck ROM: full  Mouth opening: > = 3 FB   Dental:          Pulmonary:normal exam  breath sounds clear to auscultation  (+) asthma:                            Cardiovascular:    (+) hypertension:, CAD:, hyperlipidemia      ECG reviewed  Rhythm: regular  Rate: normal  Echocardiogram reviewed                  Neuro/Psych:   Negative Neuro/Psych ROS              GI/Hepatic/Renal:   (+) renal disease: CRI, bowel prep,           Endo/Other:    (+) Diabetes, blood dyscrasia: anemia, arthritis: OA., .                 Abdominal:             Vascular: negative vascular ROS.          Other Findings:           Anesthesia Plan      MAC     ASA 3 Induction: intravenous. Anesthetic plan and risks discussed with patient. Plan discussed with attending.                     VIKTORIA Sorensen - JIM   8/10/2022

## 2022-08-17 RX ORDER — NYSTATIN 100000 U/G
CREAM TOPICAL
Qty: 1 EACH | Refills: 1 | Status: SHIPPED | OUTPATIENT
Start: 2022-08-17

## 2022-08-17 NOTE — TELEPHONE ENCOUNTER
Patient is requesting medication refill. Please approve or deny this request.    Rx requested:  Requested Prescriptions     Pending Prescriptions Disp Refills    nystatin (MYCOSTATIN) 712522 UNIT/GM cream 1 each 1     Sig: Apply topically 2 times daily.          Last Office Visit:   8/1/2022      Next Visit Date:  Future Appointments   Date Time Provider Dawna Dian   9/7/2022  9:00 AM [address]   9/20/2022  8:30 AM Jose Tobin DPM MLOX OB POD [facility] EMERGENCY [facility] AT [facility]   11/2/2022  8:45 AM MD Whitney Knowles [address] [place] [address]   11/15/2022  9:15 AM Myrna Feldman, Sam Landaverdee Kam Moya   2/10/2023  1:15 PM Chris Stephens MD [facility]

## 2022-08-21 DIAGNOSIS — I10 ESSENTIAL HYPERTENSION: ICD-10-CM

## 2022-08-22 RX ORDER — AMLODIPINE BESYLATE 10 MG/1
TABLET ORAL
Qty: 90 TABLET | Refills: 3 | Status: SHIPPED | OUTPATIENT
Start: 2022-08-22

## 2022-08-22 RX ORDER — METOPROLOL SUCCINATE 25 MG/1
TABLET, EXTENDED RELEASE ORAL
Qty: 90 TABLET | Refills: 3 | Status: SHIPPED | OUTPATIENT
Start: 2022-08-22

## 2022-08-29 DIAGNOSIS — M54.16 LUMBAR RADICULOPATHY: ICD-10-CM

## 2022-08-29 RX ORDER — GABAPENTIN 300 MG/1
300 CAPSULE ORAL 2 TIMES DAILY
Qty: 90 CAPSULE | Refills: 2 | Status: SHIPPED | OUTPATIENT
Start: 2022-08-29 | End: 2022-10-04 | Stop reason: SDUPTHER

## 2022-08-29 NOTE — TELEPHONE ENCOUNTER
Pharmacy is requesting medication refill. Please approve or deny this request.    Rx requested:  Requested Prescriptions     Pending Prescriptions Disp Refills    gabapentin (NEURONTIN) 300 MG capsule [Pharmacy Med Name: Gabapentin 300 MG Oral Capsule] 90 capsule 0     Sig: Take 1 capsule by mouth in the morning and at bedtime for 45 days.          Last Office Visit:   Visit date not found      Next Visit Date:  Future Appointments   Date Time Provider Dawna Biggs   9/7/2022  9:00 AM [facility] ROOM [address] [place] [address]   9/20/2022  8:30 AM Vinicio Craven DPM MLOX OB POD [facility] EMERGENCY [facility] AT [facility]   11/2/2022  8:45 AM [address] [place] [address] [address] [place] [address]   11/15/2022  9:15 AM Nivia Velasquez, Sam Rue Kam Moya   2/10/2023  1:15 PM Masha Tapia MD [facility]

## 2022-09-07 ENCOUNTER — HOSPITAL ENCOUNTER (OUTPATIENT)
Dept: WOMENS IMAGING | Age: 67
Discharge: HOME | End: 2022-09-09
Payer: MEDICARE

## 2022-09-07 DIAGNOSIS — Z12.31 BREAST CANCER SCREENING BY MAMMOGRAM: ICD-10-CM

## 2022-09-07 PROCEDURE — 77063 BREAST TOMOSYNTHESIS BI: CPT

## 2022-09-09 PROBLEM — Z12.11 SCREENING FOR MALIGNANT NEOPLASM OF COLON: Status: RESOLVED | Noted: 2022-08-10 | Resolved: 2022-09-09

## 2022-09-19 ENCOUNTER — HOSPITAL ENCOUNTER (OUTPATIENT)
Dept: RADIATION ONCOLOGY | Age: 67
Discharge: HOME | End: 2022-09-19
Payer: MEDICARE

## 2022-09-19 VITALS
WEIGHT: 222 LBS | DIASTOLIC BLOOD PRESSURE: 76 MMHG | HEIGHT: 67 IN | OXYGEN SATURATION: 98 % | HEART RATE: 57 BPM | SYSTOLIC BLOOD PRESSURE: 122 MMHG | RESPIRATION RATE: 16 BRPM | TEMPERATURE: 97.1 F | BODY MASS INDEX: 34.84 KG/M2

## 2022-09-19 DIAGNOSIS — C54.1 ENDOMETRIAL ADENOCARCINOMA (HCC): ICD-10-CM

## 2022-09-19 DIAGNOSIS — R33.9 RETENTION OF URINE: ICD-10-CM

## 2022-09-19 DIAGNOSIS — C55 MALIGNANT NEOPLASM OF UTERUS, UNSPECIFIED SITE (HCC): ICD-10-CM

## 2022-09-19 DIAGNOSIS — N35.92 STRICTURE OF FEMALE URETHRA, UNSPECIFIED STRICTURE TYPE: Primary | ICD-10-CM

## 2022-09-19 PROCEDURE — 99497 ADVNCD CARE PLAN 30 MIN: CPT | Performed by: RADIOLOGY

## 2022-09-19 PROCEDURE — 99205 OFFICE O/P NEW HI 60 MIN: CPT | Performed by: RADIOLOGY

## 2022-09-19 PROCEDURE — 99212 OFFICE O/P EST SF 10 MIN: CPT | Performed by: RADIOLOGY

## 2022-09-19 RX ORDER — ACETAMINOPHEN 500 MG
1000 TABLET ORAL EVERY 6 HOURS PRN
COMMUNITY

## 2022-09-19 RX ORDER — IBUPROFEN 200 MG
200 TABLET ORAL EVERY 6 HOURS PRN
COMMUNITY

## 2022-09-19 NOTE — PROGRESS NOTES
SW visit with Pt (accompanied by spouse, Denis Pate") prior to her Radiation Oncology consult with Dr. Erica Clark. Pt reports a distress level of 10/10; stating score relates to her anxiety/fear of unknown recommended treatment options. SW validated and normalized her distress while stating Dr. Erica Clark will provide good education to her re: treatment options. Pt is a retired Homemaker. Aside from positive support from spouse, her son and grandchildren (resides locally) and dtr ([facility]) are all supportive as well. Pt is not interested in AD information. She states no current SW needs, issues or concerns. SW provided business card contact should future needs arise.

## 2022-09-20 NOTE — CONSULTS
[facility]           Radiation Oncology      [address] [facility]        [address]        Cayden [place]: [phone]        F: [phone]       [email or web address]                   Dr. Kam Meyer MD PhD    CONSULT NOTE     Date of Service: 2022  Patient ID: Edinson Yadav   : 1955  MRN: [id number]   Acct Number: [de-identified]       Edinson Yadav  79 y.o.   1955    REFERRING PROVIDER: Haleigh Paul    PCP:  Armand Shaw MD    DIAGNOSIS:  1. Stricture of female urethra, unspecified stricture type    2. Malignant neoplasm of uterus, unspecified site ([facility] [address]    3. Endometrial adenocarcinoma ([facility] [address]        STAGING: Cancer Staging  Endometrial adenocarcinoma ([facility] [address]  Staging form: Corpus Uteri - Carcinoma And Carcinosarcoma, AJCC 8th Edition  - Clinical: Stage IVB (rcTX, cNX, cM1) - Signed by Kam Meyer MD on 2022      HISTORY OF PRESENT ILLNESS: Ms. Edinson Yadav  is a 79y.o. year old female with history of asthma, hypertension, diabetes mellitus with chronic kidney disease, left cataract surgery, stage I endometrioid endometrial adenocarcinoma diagnosed in  status post FRANCISCA/BSO with final pathology revealing grade 1, well to moderately differentiated endometrioid endometrial adenocarcinoma, ER+/NC +, and more recent diagnosis of recurrent endometrial cancer at the vaginal cuff. Of note the patient did not receive any adjuvant therapy after initially being diagnosed in . Her more recent oncologic history dates back to progressively worsening vaginal spotting over the past few years and abdominal discomfort/pain. Patient notes that she had been getting annual Pap testing in 2017 after which she transferred her care to [facility] and continue to get Pap testing every 2 years all of which were negative.   She did see her primary care physician and vaginal cuff biopsy was performed on 2022 with pathology revealing recurrent endometrioid endometrial adenocarcinoma. On 9/1/2022 the patient underwent an MRI of the pelvis with and without contrast revealed a 6.3 cm enhancing multilobulated soft tissue mass at the vaginal cuff, most concerning for recurrent endometrial neoplasm. The mass was seen to abut the posterior bladder wall and distal sigmoid colon/proximal rectum, suspicious for bladder and colonic wall invasion. There is also a 1.5 cm soft tissue nodule/peritoneal metastasis in the right pelvis abutting the right wall of the mid sigmoid. PET/CT on 9/6/2022 revealed a hypermetabolic soft tissue mass in the pouch of Sameer measuring 3.2 x 7 cm that abutted the bladder wall. There was a hypermetabolic area of nodularity in the right pelvis along the rectal wall consistent with metastatic implant. She met with Dr. Nelson Lisa with GYN oncology who recommended referral to medical oncology and radiation oncology for consideration of induction systemic therapy with or without radiation therapy followed by surgical resection if possible. The patient did meet with medical oncology 9/7/2022 and discussed chemotherapy with weekly carboplatin/paclitaxel. The patient presents today to discuss the role and rationale of radiation therapy in the management of her recurrent disease. Symptomatically she notes some occasional abdominal pain that is intermittent and occurs 1-2 times weekly for the past year. She is also noted vaginal bleeding/spotting a few times weekly for the past few years. She is self catheterizing since 1997 due to a longstanding history of urethral strictures. She does note that her self-catheterization has been progressively more difficult over the past month. She denies any blood in the urine or stool. She denies any constipation or diarrhea. She also notes some right hip pain that radiates down to the right leg.   This pain has notably developed since her MRI of the pelvis and is worse with getting up from a seated or laying flat position. She denies any other complaints at this time. PAST MEDICAL HISTORY:      Diagnosis Date    Allergic rhinitis     Asthma     Chest pain, unspecified 8/20/2018    Endometrial cancer, grade I ([facility] [address] 2011    [facility]    Hypertension     Kidney problem     blockage in urethra and urine backs up has to get urethra dilated, has scar tissue    Type 2 diabetes mellitus with chronic kidney disease, without long-term current use of insulin ([facility] [address] 6/21/2019       PAST SURGICAL HISTORY:      Procedure Laterality Date    CATARACT REMOVAL Left 11/2020    CHOLECYSTECTOMY  1990    COLONOSCOPY N/A 8/10/2022    COLORECTAL CANCER SCREENING, NOT HIGH RISK performed by Que Hernandez MD at [address] [facility] ([facility])  2011    endometrial cancer stage !a    URETHRAL STRICTURE DILATATION  2016    Dr. Lacey Garcia        Allergies   Allergen Reactions    Ciprofloxacin        MEDICATIONS:  Medications reviewed and reconciled. Current Outpatient Medications   Medication Sig Dispense Refill    acetaminophen (TYLENOL) 500 MG tablet Take 1,000 mg by mouth every 6 hours as needed for Pain      ibuprofen (ADVIL;MOTRIN) 200 MG tablet Take 200 mg by mouth every 6 hours as needed for Pain      metoprolol succinate (TOPROL XL) 25 MG extended release tablet Take 1 tablet by mouth once daily 90 tablet 3    amLODIPine (NORVASC) 10 MG tablet Take 1 tablet by mouth once daily 90 tablet 3    gabapentin (NEURONTIN) 300 MG capsule Take 1 capsule by mouth in the morning and at bedtime for 45 days. 90 capsule 2    nystatin (MYCOSTATIN) 579123 UNIT/GM cream Apply topically 2 times daily.  1 each 1    atorvastatin (LIPITOR) 10 MG tablet Take 1 tablet by mouth once daily 90 tablet 0    glipiZIDE (GLUCOTROL XL) 2.5 MG extended release tablet Take 1 tablet by mouth once daily 90 tablet 0    Handicap Placard MISC by Does not apply route Expires: 7/9/2027 1 each 0    Lancets (ONETOUCH DELICA PLUS [id number]) MISC USE 1  TO CHECK GLUCOSE THREE TIMES DAILY 100 each 5    ONETOUCH ULTRA strip USE 1 STRIP TO CHECK GLUCOSE THREE TIMES DAILY 100 each 5    tamsulosin (FLOMAX) 0.4 MG capsule Take 1 capsule by mouth daily 90 capsule 3    blood glucose monitor kit and supplies DX: diabetes mellitus. Test 3 time(s) daily - Ok to substitute per insurance 1 kit 1    VITAMIN D PO Take by mouth      CRANBERRY PO Take by mouth      Alcohol Swabs PADS DX: diabetes mellitus. Test 1-3 time(s) daily - Ok to substitute per insurance (1 each = 1 box) 1 each 5    aspirin 81 MG tablet Take 81 mg by mouth daily       No current facility-administered medications for this encounter. FAMILY HISTORY:  Family History   Problem Relation Age of Onset    Rheum Arthritis Mother     Osteoporosis Mother     No Known Problems Father         complications afte gallbladder surgery    Osteoarthritis Sister     Osteoarthritis Sister     Coronary Art Dis Brother     Heart Surgery Brother     Colon Cancer Neg Hx        SOCIAL HISTORY:       reports that she has never smoked. She has never used smokeless tobacco..   reports no history of alcohol use. .   reports no history of drug use. REVIEW OF SYSTEMS:  Obtained from the patient, chart review and nursing assessment. Review of Systems   All other systems reviewed and are negative. PHYSICAL EXAMINATION:      Vitals:    09/19/22 1519   BP: 122/76   Pulse: 57   Resp: 16   Temp: 97.1 °F (36.2 °C)   SpO2: 98%   Weight: 222 lb (100.7 kg)   Height: 5' 7\" (1.702 m)       Wt Readings from Last 3 Encounters:   09/19/22 222 lb (100.7 kg)   08/10/22 218 lb (98.9 kg)   08/01/22 221 lb (100.2 kg)       ECOG PERFORMANCE STATUS:  1    Physical Exam  Vitals and nursing note reviewed. Constitutional:       Appearance: Normal appearance. Comments: Accompanied by her    HENT:      Head: Normocephalic and atraumatic. Eyes:      General: No scleral icterus. Extraocular Movements: Extraocular movements intact. Conjunctiva/sclera: Conjunctivae normal.   Cardiovascular:      Rate and Rhythm: Normal rate. Heart sounds: Normal heart sounds. Pulmonary:      Effort: Pulmonary effort is normal.      Breath sounds: Normal breath sounds. Abdominal:      General: Bowel sounds are normal. There is no distension. Palpations: Abdomen is soft. There is no mass. Tenderness: There is no abdominal tenderness. Genitourinary:     General: Normal vulva. Comments: Speculum examination revealed a mass at the vaginal cuff encompassing nearly the entire circumferential area. The mass was friable but nontender to palpation. It appeared to be fixed to the pelvic wall. There was no tenderness on bimanual examination. Musculoskeletal:      Cervical back: Normal range of motion and neck supple. Neurological:      Mental Status: She is alert. RADIATION SAFETY AND ONCOLOGIC TREATMENT SUPPORT:    - Previous radiation history: None  - History of autoimmune or connective tissue disease: None  - Nutritional support/ PEG: Not applicable  - Dental evaluation: Not applicable  -  requested:   met with the patient, please refer to the note from that encounter.  - Transportation for daily treatment: No issues    TUMOR MARKERS: No results found for: PSA, CEA, , [id number],     IMAGING REVIEWED: Per HPI    PATHOLOGY REVIEWED: Per HPI    IMPRESSION: This is a 71-year-old female with history of asthma, hypertension, diabetes mellitus with chronic kidney disease, left cataract surgery, stage I endometrioid endometrial adenocarcinoma diagnosed in 2011 status post FRANCISCA/BSO with final pathology revealing grade 1, well to moderately differentiated endometrioid endometrial adenocarcinoma, ER+/[place] +, and more recent diagnosis of recurrent endometrial cancer at the vaginal cuff. Of note the patient did not receive any adjuvant therapy after initially being diagnosed in 2011.   She presents discussed the role and rationale of radiation therapy in the management of her disease. DISCUSSION/PLAN:   I discussed the risks, benefits, and rationale of radiation therapy in the management of recurrent endometrial adenocarcinoma at the vaginal cuff. I reviewed with her that her imaging is concerning for locally advanced recurrent disease with involvement of the bladder and sigmoid colon. As such I am recommending induction chemotherapy in order to achieve possible response followed by resection or radiation therapy pending response. I discussed that upfront chemoradiation therapy would be quite toxic to the bladder and large bowel given the size and extent of her disease. I also recommended that she see her urologist in the coming weeks for repeat cystoscopy to evaluate the cause of her increased difficulty with self-catheterization as well as to evaluate the lumen of the bladder given concern for bladder invasion an MRI of the pelvis with and without contrast.  The patient is amenable to this. I have discussed the case with Dr. Sanjuana Dodd with medical oncology and he is amenable to starting systemic therapy next week with weekly Carbo/Taxol. I will defer to his recommendations regarding the number of cycles prior to restaging scans. In the interim the patient knows remain available should she have any additional questions or concerns. Thank you very much for the referral and for the opportunity to provide care for this patient. A total of 16 minutes were spent discussing Advance Care Planning to understand personal values, life goals, and preferences regarding future medical care with respect to potential modifications/establishing of advanced directives and any modifications of current goals of care. A combined total of 65 minutes was spent in preparing this consultation as well as in meeting with the patient and her  in person.   Please excuse any typos in this consultation note as voice dictation was used. ORDERS: Referral to urology    FOLLOW-UP: Return to clinic as clinically indicated pending response for systemic therapy.     Mariela Hernandez MD PHD  Radiation Oncologist, [address] [place] [address] Director    Department [place] [id number]  [place]

## 2022-10-04 DIAGNOSIS — M54.16 LUMBAR RADICULOPATHY: ICD-10-CM

## 2022-10-04 RX ORDER — GABAPENTIN 300 MG/1
300 CAPSULE ORAL 3 TIMES DAILY
Qty: 90 CAPSULE | Refills: 2 | Status: SHIPPED | OUTPATIENT
Start: 2022-10-04 | End: 2022-11-03

## 2022-10-04 NOTE — TELEPHONE ENCOUNTER
Please see if patient is able to make an appointment, it can be virtual, to discuss medication changes if needed.

## 2022-10-04 NOTE — TELEPHONE ENCOUNTER
Patient requesting medication refill. Please approve or deny this request.  Patient states she takes med TID not BID. Needs new script written. Also reports her cancer came back and she started chemo but her blood sugars have been high, would like to know if her medications need to be adjusted. Rx requested:  Requested Prescriptions     Pending Prescriptions Disp Refills    gabapentin (NEURONTIN) 300 MG capsule 90 capsule 0     Sig: Take 1 capsule by mouth in the morning and at bedtime for 45 days.          Last Office Visit:   8/1/2022      Next Visit Date:  Future Appointments   Date Time Provider Dawna Biggs   11/2/2022  8:45 AM [id number] [place] 140MD Neeta Gurinder [address]   11/8/2022 10:00 AM Charleen Chavarria MD [facility] ONC [facility]   11/15/2022  9:15 AM Anushka Mares MD [facility]   2/10/2023  1:15 PM Mariusz Cordova MD [facility]

## 2022-10-11 ENCOUNTER — OFFICE VISIT (OUTPATIENT)
Dept: FAMILY MEDICINE CLINIC | Age: 67
End: 2022-10-11
Payer: MEDICARE

## 2022-10-11 VITALS
OXYGEN SATURATION: 99 % | DIASTOLIC BLOOD PRESSURE: 70 MMHG | SYSTOLIC BLOOD PRESSURE: 116 MMHG | HEART RATE: 53 BPM | TEMPERATURE: 97.2 F | BODY MASS INDEX: 34.08 KG/M2 | WEIGHT: 217.6 LBS

## 2022-10-11 DIAGNOSIS — N18.32 STAGE 3B CHRONIC KIDNEY DISEASE (HCC): ICD-10-CM

## 2022-10-11 DIAGNOSIS — E11.22 TYPE 2 DIABETES MELLITUS WITH STAGE 3A CHRONIC KIDNEY DISEASE, WITHOUT LONG-TERM CURRENT USE OF INSULIN (HCC): Primary | ICD-10-CM

## 2022-10-11 DIAGNOSIS — N18.31 TYPE 2 DIABETES MELLITUS WITH STAGE 3A CHRONIC KIDNEY DISEASE, WITHOUT LONG-TERM CURRENT USE OF INSULIN (HCC): Primary | ICD-10-CM

## 2022-10-11 DIAGNOSIS — C54.1 ENDOMETRIAL ADENOCARCINOMA (HCC): ICD-10-CM

## 2022-10-11 LAB — HBA1C MFR BLD: 8.2 %

## 2022-10-11 PROCEDURE — 99214 OFFICE O/P EST MOD 30 MIN: CPT | Performed by: FAMILY MEDICINE

## 2022-10-11 PROCEDURE — 1123F ACP DISCUSS/DSCN MKR DOCD: CPT | Performed by: FAMILY MEDICINE

## 2022-10-11 PROCEDURE — 83036 HEMOGLOBIN GLYCOSYLATED A1C: CPT | Performed by: FAMILY MEDICINE

## 2022-10-11 PROCEDURE — 3052F HG A1C>EQUAL 8.0%<EQUAL 9.0%: CPT | Performed by: FAMILY MEDICINE

## 2022-10-11 RX ORDER — GLIPIZIDE 2.5 MG/1
5 TABLET, EXTENDED RELEASE ORAL DAILY
Qty: 180 TABLET | Refills: 0 | Status: SHIPPED | OUTPATIENT
Start: 2022-10-11

## 2022-10-11 RX ORDER — PROCHLORPERAZINE MALEATE 10 MG
TABLET ORAL
COMMUNITY
Start: 2022-09-19

## 2022-10-11 RX ORDER — DEXAMETHASONE 4 MG/1
TABLET ORAL
COMMUNITY
Start: 2022-09-19

## 2022-10-11 ASSESSMENT — ENCOUNTER SYMPTOMS
NAUSEA: 0
DIARRHEA: 0
SHORTNESS OF BREATH: 0
BLOOD IN STOOL: 0
APNEA: 0
VOMITING: 0
COUGH: 0
CONSTIPATION: 0
CHEST TIGHTNESS: 0
ABDOMINAL PAIN: 0

## 2022-10-11 NOTE — PROGRESS NOTES
Subjective:      Patient ID: Jonathon Hartmann is a 79 y.o. female who presents today for:     Chief Complaint   Patient presents with    Diabetes     Discuss medications with starting chemo, states glucose is running high with steroids. Running around 200-400. Pt states she has endometrial cancer        Diabetes  Pertinent negatives for hypoglycemia include no headaches or seizures. Pertinent negatives for diabetes include no chest pain and no fatigue. Patient is a very pleasant 80-year-old female presents today to follow-up on diabetes. Since last visit, unfortunately patient has been diagnosed with endometrial cancer. She was previously in remission for over 10 years but now cancer has reoccurred. She is currently in the care of gynecology oncology, oncology and radiation oncology. She is receiving chemotherapy on a weekly basis and receives steroids with each treatment. This has caused an increase in her overall blood sugar between 200 and 400. She has previously been well controlled with last hemoglobin A1c being 7.0. She denies any polyuria or polydipsia and is requesting a medication increase/change. Additionally, patient has a history of chronic kidney disease stage IIIb.   She sees nephrology on a yearly basis    Past Medical History:   Diagnosis Date    Allergic rhinitis     Asthma     Chest pain, unspecified 8/20/2018    Endometrial cancer, grade I ([address] 2011    [facility]    Hypertension     Kidney problem     blockage in urethra and urine backs up has to get urethra dilated, has scar tissue    Type 2 diabetes mellitus with chronic kidney disease, without long-term current use of insulin ([address] 6/21/2019     Past Surgical History:   Procedure Laterality Date    CATARACT REMOVAL Left 11/2020    CHOLECYSTECTOMY  1990    COLONOSCOPY N/A 8/10/2022    COLORECTAL CANCER SCREENING, NOT HIGH RISK performed by Humberto Delvalle MD at [address] ([address] [place] [address])  2011 endometrial cancer stage !a    URETHRAL STRICTURE DILATATION  2016    Dr. Marin Mccracken      Family History   Problem Relation Age of Onset    Rheum Arthritis Mother     Osteoporosis Mother     No Known Problems Father         complications afte gallbladder surgery    Osteoarthritis Sister     Osteoarthritis Sister     Coronary Art Dis Brother     Heart Surgery Brother     Colon Cancer Neg Hx      Social History     Socioeconomic History    Marital status:      Spouse name: Not on file    Number of children: Not on file    Years of education: Not on file    Highest education level: Not on file   Occupational History    Not on file   Tobacco Use    Smoking status: Never    Smokeless tobacco: Never   Substance and Sexual Activity    Alcohol use: No    Drug use: No    Sexual activity: Not on file   Other Topics Concern    Not on file   Social History Narrative    Not on file     Social Determinants of Health     Financial Resource Strain: Low Risk     Difficulty of Paying Living Expenses: Not hard at all   Food Insecurity: No Food Insecurity    Worried About Running Out of Food in the Last Year: Never true    [place] of Food in the Last Year: Never true   Transportation Needs: Not on file   Physical Activity: Insufficiently Active    Days of Exercise per Week: 3 days    Minutes of Exercise per Session: 10 min   Stress: Not on file   Social Connections: Not on file   Intimate Partner Violence: Not on file   Housing Stability: Not on file     Current Outpatient Medications on File Prior to Visit   Medication Sig Dispense Refill    dexamethasone (DECADRON) 4 MG tablet TAKE 5 TABLETS THE NIGHT BEFORE AND 5 TABLETS THE MORNING OF FIRST CHEMOTHERAPY. prochlorperazine (COMPAZINE) 10 MG tablet TAKE 1 TABLET BY MOUTH EVERY 6 HOURS AS NEEDED FOR NAUSEA      gabapentin (NEURONTIN) 300 MG capsule Take 1 capsule by mouth 3 times daily for 30 days.  90 capsule 2    acetaminophen (TYLENOL) 500 MG tablet Take 1,000 mg by mouth every 6 hours as needed for Pain      ibuprofen (ADVIL;MOTRIN) 200 MG tablet Take 200 mg by mouth every 6 hours as needed for Pain      metoprolol succinate (TOPROL XL) 25 MG extended release tablet Take 1 tablet by mouth once daily 90 tablet 3    amLODIPine (NORVASC) 10 MG tablet Take 1 tablet by mouth once daily 90 tablet 3    nystatin (MYCOSTATIN) 526901 UNIT/GM cream Apply topically 2 times daily. 1 each 1    atorvastatin (LIPITOR) 10 MG tablet Take 1 tablet by mouth once daily 90 tablet 0    Handicap Placard MISC by Does not apply route Expires: 7/9/2027 1 each 0    Lancets (ONETOUCH DELICA PLUS [id number]) MISC USE 1  TO CHECK GLUCOSE THREE TIMES DAILY 100 each 5    ONETOUCH ULTRA strip USE 1 STRIP TO CHECK GLUCOSE THREE TIMES DAILY 100 each 5    tamsulosin (FLOMAX) 0.4 MG capsule Take 1 capsule by mouth daily 90 capsule 3    blood glucose monitor kit and supplies DX: diabetes mellitus. Test 3 time(s) daily - Ok to substitute per insurance 1 kit 1    VITAMIN D PO Take by mouth      CRANBERRY PO Take by mouth      Alcohol Swabs PADS DX: diabetes mellitus. Test 1-3 time(s) daily - Ok to substitute per insurance (1 each = 1 box) 1 each 5    aspirin 81 MG tablet Take 81 mg by mouth daily       No current facility-administered medications on file prior to visit. Allergies:  Ciprofloxacin    Review of Systems   Constitutional:  Negative for activity change, appetite change and fatigue. Respiratory:  Negative for apnea, cough, chest tightness and shortness of breath. Cardiovascular:  Negative for chest pain, palpitations and leg swelling. Gastrointestinal:  Negative for abdominal pain, blood in stool, constipation, diarrhea, nausea and vomiting. Musculoskeletal:  Negative for arthralgias. Neurological:  Negative for seizures and headaches. Psychiatric/Behavioral:  Negative for hallucinations and suicidal ideas.       Objective:   /70   Pulse 53   Temp 97.2 °F (36.2 °C) (Infrared)   Wt 217 lb about 1 month (around 11/11/2022) for F/u DM.     Young Mota MD

## 2022-11-02 DIAGNOSIS — E11.22 TYPE 2 DIABETES MELLITUS WITH STAGE 3A CHRONIC KIDNEY DISEASE, WITHOUT LONG-TERM CURRENT USE OF INSULIN (HCC): ICD-10-CM

## 2022-11-02 DIAGNOSIS — N18.31 TYPE 2 DIABETES MELLITUS WITH STAGE 3A CHRONIC KIDNEY DISEASE, WITHOUT LONG-TERM CURRENT USE OF INSULIN (HCC): ICD-10-CM

## 2022-11-03 RX ORDER — ATORVASTATIN CALCIUM 10 MG/1
TABLET, FILM COATED ORAL
Qty: 90 TABLET | Refills: 0 | Status: SHIPPED | OUTPATIENT
Start: 2022-11-03

## 2022-11-03 NOTE — TELEPHONE ENCOUNTER
Last Office Visit (last PCP visit):   10/11/2022    Next Visit Date:  Future Appointments   Date Time Provider Dawna Biggs   11/15/2022  9:15 AM Leydi Alvarez MD [facility]   12/14/2022  9:00 AM SCHEDULE, [facility] RAD ONC NURSE [facility] RAD ONC [facility]   12/14/2022 10:00 AM SCHEDULE, MLOZ CT SIM [facility] RAD ONC [facility]   2/10/2023  1:15 PM Laurel Humphrey MD [facility]       **If hasn't been seen in over a year OR hasn't followed up according to last diabetes/ADHD visit, make appointment for patient before sending refill to provider.     Rx requested:  Requested Prescriptions     Pending Prescriptions Disp Refills    atorvastatin (LIPITOR) 10 MG tablet [Pharmacy Med Name: Atorvastatin Calcium 10 MG Oral Tablet] 90 tablet 0     Sig: Take 1 tablet by mouth once daily

## 2022-11-15 DIAGNOSIS — E11.9 TYPE 2 DIABETES MELLITUS WITHOUT COMPLICATION, WITHOUT LONG-TERM CURRENT USE OF INSULIN (HCC): ICD-10-CM

## 2022-11-15 RX ORDER — BLOOD SUGAR DIAGNOSTIC
STRIP MISCELLANEOUS
Qty: 100 EACH | Refills: 0 | Status: SHIPPED | OUTPATIENT
Start: 2022-11-15

## 2022-11-15 NOTE — TELEPHONE ENCOUNTER
Pharmacy is requesting medication refill.  Please approve or deny this request.    Rx requested:  Requested Prescriptions     Pending Prescriptions Disp Refills    ONETOUCH ULTRA strip [Pharmacy Med Name: OneTouch Ultra Blue In Vitro Strip] 100 each 0     Sig: USE 1 STRIP  [address]         Last Office Visit:   10/11/2022      Next Visit Date:  Future Appointments   Date Time Provider Dawna Biggs   11/18/2022  9:30 AM Kathleen Newton MD [facility]   11/21/2022 10:30 AM [place] [facility] 1 [facility] MRI [facility] Fac RAD   11/21/2022 11:30 AM [place] [facility] RAD   12/14/2022  9:00 AM SCHEDULE, [facility] RAD ONC NURSE [facility] ONC [place] [facility]   12/14/2022 10:00 AM SCHEDULE, MLOZ CT SIM [facility] ONC [place] [facility]   2/10/2023  1:15 PM Sushil Pfeiffer MD [facility]

## 2022-11-16 DIAGNOSIS — E11.9 TYPE 2 DIABETES MELLITUS WITHOUT COMPLICATION, WITHOUT LONG-TERM CURRENT USE OF INSULIN (HCC): ICD-10-CM

## 2022-11-16 NOTE — TELEPHONE ENCOUNTER
Pharmacy is requesting medication refill.  Please approve or deny this request.    Rx requested:  Requested Prescriptions     Pending Prescriptions Disp Refills    Lancets (Angela Bertrand) [address] [facility] [Pharmacy Med Name: Vern MIX 78C MIS] 856 each 0     Sig: USE 1  TO [address] [facility] Office Visit:   10/11/2022      Next Visit Date:  Future Appointments   Date Time Provider Dawna Biggs   11/21/2022 10:30 AM [place] [facility] RO[facility] 1 [facility] MRI [facility] Fac RAD   11/21/2022 11:30 AM [place] [facility] RO[facility] 1 [facility] MRI [facility] Fac RAD   12/12/2022  1:00 PM Klaudia Whitfield MD [facility]   12/14/2022  9:00 AM SCHEDULE, [facility] RAD ONC NURSE [facility] RAD ONC [place] [facility]   12/14/2022 10:00 AM SCHEDULE, MLOZ CT SIM [facility] RAD ONC [place] [facility]   2/10/2023  1:15 PM Lauren Baker MD [facility]

## 2022-11-18 RX ORDER — LANCETS 33 GAUGE
EACH MISCELLANEOUS
Qty: 100 EACH | Refills: 5 | Status: SHIPPED | OUTPATIENT
Start: 2022-11-18

## 2022-11-21 ENCOUNTER — HOSPITAL ENCOUNTER (OUTPATIENT)
Dept: MRI IMAGING | Age: 67
Discharge: HOME | End: 2022-11-23
Payer: MEDICARE

## 2022-11-21 DIAGNOSIS — C54.1 SARCOMA, ENDOMETRIAL (HCC): ICD-10-CM

## 2022-11-21 PROCEDURE — A9577 INJ MULTIHANCE: HCPCS | Performed by: INTERNAL MEDICINE

## 2022-11-21 PROCEDURE — 6360000004 HC RX CONTRAST MEDICATION: Performed by: INTERNAL MEDICINE

## 2022-11-21 PROCEDURE — 72197 MRI PELVIS W/O & W/DYE: CPT

## 2022-11-21 RX ADMIN — GADOBENATE DIMEGLUMINE 20 ML: 529 INJECTION, SOLUTION INTRAVENOUS at 11:20

## 2022-12-14 ENCOUNTER — HOSPITAL ENCOUNTER (OUTPATIENT)
Dept: RADIATION ONCOLOGY | Age: 67
Discharge: HOME | End: 2022-12-14
Payer: MEDICARE

## 2022-12-14 ENCOUNTER — HOSPITAL ENCOUNTER (OUTPATIENT)
Dept: LAB | Age: 67
Discharge: HOME | End: 2022-12-14

## 2022-12-14 VITALS
WEIGHT: 219.4 LBS | TEMPERATURE: 97.4 F | SYSTOLIC BLOOD PRESSURE: 120 MMHG | OXYGEN SATURATION: 98 % | RESPIRATION RATE: 18 BRPM | BODY MASS INDEX: 34.36 KG/M2 | DIASTOLIC BLOOD PRESSURE: 59 MMHG | HEART RATE: 61 BPM

## 2022-12-14 LAB
ALBUMIN SERPL-MCNC: 3.9 G/DL (ref 3.5–4.6)
ANION GAP SERPL CALCULATED.3IONS-SCNC: 9 MEQ/L (ref 9–15)
BACTERIA: ABNORMAL /HPF
BILIRUBIN URINE: NEGATIVE
BLOOD, URINE: ABNORMAL
BUN BLDV-MCNC: 17 MG/DL (ref 8–23)
CALCIUM SERPL-MCNC: 9.6 MG/DL (ref 8.5–9.9)
CHLORIDE BLD-SCNC: 103 MEQ/L (ref 95–107)
CLARITY: ABNORMAL
CO2: 25 MEQ/L (ref 20–31)
COLOR: YELLOW
CREAT SERPL-MCNC: 1.2 MG/DL (ref 0.5–0.9)
EPITHELIAL CELLS, UA: ABNORMAL /HPF (ref 0–5)
GFR SERPL CREATININE-BSD FRML MDRD: 49.5 ML/MIN/{1.73_M2}
GLUCOSE BLD-MCNC: 150 MG/DL (ref 70–99)
GLUCOSE URINE: NEGATIVE MG/DL
HYALINE CASTS: ABNORMAL /HPF (ref 0–5)
KETONES, URINE: NEGATIVE MG/DL
LEUKOCYTE ESTERASE, URINE: ABNORMAL
NITRITE, URINE: NEGATIVE
PH UA: 5.5 (ref 5–9)
PHOSPHORUS: 3.6 MG/DL (ref 2.3–4.8)
POTASSIUM SERPL-SCNC: 4.7 MEQ/L (ref 3.4–4.9)
PROTEIN UA: NEGATIVE MG/DL
RBC UA: ABNORMAL /HPF (ref 0–5)
SODIUM BLD-SCNC: 137 MEQ/L (ref 135–144)
SPECIFIC GRAVITY UA: 1 (ref 1–1.03)
UROBILINOGEN, URINE: 0.2 E.U./DL
WBC UA: >100 /HPF (ref 0–5)

## 2022-12-14 PROCEDURE — 99214 OFFICE O/P EST MOD 30 MIN: CPT | Performed by: RADIOLOGY

## 2022-12-14 PROCEDURE — 77334 RADIATION TREATMENT AID(S): CPT | Performed by: RADIOLOGY

## 2022-12-14 PROCEDURE — 77263 THER RADIOLOGY TX PLNG CPLX: CPT | Performed by: RADIOLOGY

## 2022-12-14 NOTE — PROGRESS NOTES
NURSING ASSESSMENT     Date: 12/14/2022        Patient Name: Reba Parmar     YOB: 1955      Age:  79 y.o. MRN: [id number]       Chaperone [x] Yes   [] No   - Buddy    Advance Directives:   Do you currently have completed advance directives (living will)? [] Yes   [x] No         *If yes, please bring us a copy for your records. *If no, would you like info or assistance in completing advance directives (living will)? [] Yes   [x] No    Pain Score:   Pain Score (1-10): Moderate 5   Pain Location: Lower back   Pain Duration: Intermittent but has had chronically for many years   Pain Management/Control: Tylenol Arthritis as needed      Is pain affecting your ability to take care of yourself or move throughout your home? [] Yes   [x] No    General: Has regained some energy since finishing chemo in early November of 2022  Patient has gained weight [] Yes   [x] No  Patient has lost weight [] Yes   [] No  How much weight in pounds and over what length of time: Down 2.5 lbs since last visit on 9/19/22. Appetite is good. Eyes (Ophthalmic): No Problem     Skin (Dermatological): No Problems     ENT: No Problems     Respiratory: No Problems     Cardiovascular: Has occasional palpitations. Every once in a while will develop mild swelling of ankles that resolves by morning.       Device   [] Yes   [x] No   Copy of Card Obtained [] Yes   [x] No    Gastrointestinal: Takes miralax daily, and has daily soft BM's    Genito-Urinary:    Self- cath 5-6 times post void in a 24 hr period, still gets up 3-5 at night to void/self-cath   Blood in Urine- denies       Breast: No Problems     Musculoskeletal: Back Pain    Neurological: Tingling on occasion to left forearm to hand since chemo      Hematological and Lymphatic: No Problems     Endocrine: Diabetes Mellitus    Gyn History: See initial consult on 9/19/22  No vaginal bleeding since chemo started    A 10-point review of systems has been conducted and pertinent positives have been   recorded. All other review of systems are negative    Was the patient admitted during the course of treatment OR within 30 days of treatment?  No        Additional Comments:  Finished carbo/taxol on 11/7/22

## 2022-12-14 NOTE — PROGRESS NOTES
Teaching & Instructions - Pelvis  General  Simulation  Initial Treatment  Self-Care Info  Nutrition  Social Service    Site-Specific  Side Effects  Cystitis Mgmt  Filling bladder  Bowel Mgmt  Fatigue Mgmt  Perineal/Vaginal Care  Proctitis Mgmt  Sexuality Issues  Skin Care-Aquaphor    Educational Handouts  Radiation Therapy & You  Site Specific Instructions  Radiation Oncology Dept Information  CBMS Program    Patient eager to learn, verbalized understanding of verbal education and handouts. RADIATION THERAPY BARRIERS ASSESSMENT      During your CT Simulation, you were placed in the position that you be in for your radiation treatments. You will be will in this position for approximately 10-15 minutes, do you have any concerns about staying in this position for the required time? no    While you are on treatment, you will be evaluated by the Radiation Oncologist once a week on Thursdays. The focus of these appointments will be management of side effects. You will be here for a minimum of one hour or more depending on your specific needs. Do you have any concerns about your ability to keep this appointment? no    You will be coming to the [facility] for 25    daily treatments, do you have any concerns about transportation?    no    Do you have any financial concerns about your treatment that you would like to further discuss with social work? no    Do you have any other concerns or for see any concerns or potential barriers to completing your radiation therapy that you want us to be aware of? no

## 2022-12-15 LAB
CREATININE URINE: 38.7 MG/DL
PROTEIN PROTEIN: 12 MG/DL
PROTEIN/CREAT RATIO: 0.3 ML/ML
PROTEIN/CREAT RATIO: 0.3 ML/ML (ref 0–0.2)
VITAMIN D 25-HYDROXY: 50.7 NG/ML

## 2022-12-16 LAB
COMMENT: NORMAL
MISCELLANEOUS LAB TEST RESULT: NORMAL

## 2022-12-19 DIAGNOSIS — E11.9 TYPE 2 DIABETES MELLITUS WITHOUT COMPLICATION, WITHOUT LONG-TERM CURRENT USE OF INSULIN (HCC): ICD-10-CM

## 2022-12-19 NOTE — TELEPHONE ENCOUNTER
Pharmacy is requesting medication refill.  Please approve or deny this request.    Rx requested:  Requested Prescriptions     Pending Prescriptions Disp Refills    ONETOUCH ULTRA strip [Pharmacy Med Name: OneTouch Ultra Blue In Vitro Strip] 100 each 0     Sig: USE 1 STRIP  [address]         Last Office Visit:   10/11/2022      Next Visit Date:  Future Appointments   Date Time Provider Dawna Biggs   12/27/2022  9:00 AM SCHEDULE, [facility] RAD ONC [facility] 2 [facility] RAD ONC [place] [facility]   12/29/2022  1:00 PM TAMIA Fish [facility] [place]   2/10/2023  1:15 PM Karina Jacobson MD [facility]

## 2022-12-20 RX ORDER — BLOOD SUGAR DIAGNOSTIC
STRIP MISCELLANEOUS
Qty: 100 EACH | Refills: 5 | Status: SHIPPED | OUTPATIENT
Start: 2022-12-20

## 2022-12-27 ENCOUNTER — APPOINTMENT (OUTPATIENT)
Dept: RADIATION ONCOLOGY | Age: 67
End: 2022-12-27
Payer: MEDICARE

## 2022-12-28 ENCOUNTER — APPOINTMENT (OUTPATIENT)
Dept: RADIATION ONCOLOGY | Age: 67
End: 2022-12-28
Payer: MEDICARE

## 2022-12-28 PROCEDURE — 77386 HC NTSTY MODUL RAD TX DLVR CPLX: CPT | Performed by: RADIOLOGY

## 2022-12-28 PROCEDURE — 77014 CHG CT GUIDANCE RADIATION THERAPY FLDS PLACEMENT: CPT | Performed by: RADIOLOGY

## 2022-12-29 ENCOUNTER — APPOINTMENT (OUTPATIENT)
Dept: RADIATION ONCOLOGY | Age: 67
End: 2022-12-29
Payer: MEDICARE

## 2022-12-29 ENCOUNTER — OFFICE VISIT (OUTPATIENT)
Dept: UROLOGY | Age: 67
End: 2022-12-29
Payer: MEDICARE

## 2022-12-29 ENCOUNTER — HOSPITAL ENCOUNTER (OUTPATIENT)
Dept: RADIATION ONCOLOGY | Age: 67
Discharge: HOME | End: 2022-12-29
Payer: MEDICARE

## 2022-12-29 VITALS
DIASTOLIC BLOOD PRESSURE: 78 MMHG | HEART RATE: 55 BPM | WEIGHT: 218 LBS | SYSTOLIC BLOOD PRESSURE: 124 MMHG | BODY MASS INDEX: 34.21 KG/M2 | HEIGHT: 67 IN

## 2022-12-29 VITALS
WEIGHT: 221.6 LBS | RESPIRATION RATE: 18 BRPM | BODY MASS INDEX: 34.71 KG/M2 | HEART RATE: 49 BPM | SYSTOLIC BLOOD PRESSURE: 147 MMHG | DIASTOLIC BLOOD PRESSURE: 67 MMHG | TEMPERATURE: 97.5 F | OXYGEN SATURATION: 99 %

## 2022-12-29 DIAGNOSIS — R33.9 URINARY RETENTION: Primary | ICD-10-CM

## 2022-12-29 DIAGNOSIS — N35.92 STRICTURE OF FEMALE URETHRA, UNSPECIFIED STRICTURE TYPE: ICD-10-CM

## 2022-12-29 PROCEDURE — 99203 OFFICE O/P NEW LOW 30 MIN: CPT | Performed by: PHYSICIAN ASSISTANT

## 2022-12-29 PROCEDURE — 77386 HC NTSTY MODUL RAD TX DLVR CPLX: CPT | Performed by: RADIOLOGY

## 2022-12-29 PROCEDURE — 1123F ACP DISCUSS/DSCN MKR DOCD: CPT | Performed by: PHYSICIAN ASSISTANT

## 2022-12-29 PROCEDURE — 3074F SYST BP LT 130 MM HG: CPT | Performed by: PHYSICIAN ASSISTANT

## 2022-12-29 PROCEDURE — 3078F DIAST BP <80 MM HG: CPT | Performed by: PHYSICIAN ASSISTANT

## 2022-12-29 RX ORDER — TAMSULOSIN HYDROCHLORIDE 0.4 MG/1
0.4 CAPSULE ORAL DAILY
Qty: 30 CAPSULE | Refills: 11 | Status: SHIPPED | OUTPATIENT
Start: 2022-12-29 | End: 2023-01-28

## 2022-12-29 RX ORDER — SULFAMETHOXAZOLE AND TRIMETHOPRIM 800; 160 MG/1; MG/1
1 TABLET ORAL 2 TIMES DAILY
Qty: 20 TABLET | Refills: 0 | Status: SHIPPED | OUTPATIENT
Start: 2022-12-29 | End: 2023-01-08

## 2022-12-29 ASSESSMENT — ENCOUNTER SYMPTOMS
ABDOMINAL PAIN: 0
BACK PAIN: 0
SHORTNESS OF BREATH: 0
COUGH: 0
TROUBLE SWALLOWING: 0
SINUS PRESSURE: 0
VOMITING: 0
DIARRHEA: 0
CHEST TIGHTNESS: 0

## 2022-12-29 ASSESSMENT — PATIENT HEALTH QUESTIONNAIRE - PHQ9
SUM OF ALL RESPONSES TO PHQ QUESTIONS 1-9: 0
SUM OF ALL RESPONSES TO PHQ QUESTIONS 1-9: 0
SUM OF ALL RESPONSES TO PHQ9 QUESTIONS 1 & 2: 0
2. FEELING DOWN, DEPRESSED OR HOPELESS: 0
SUM OF ALL RESPONSES TO PHQ QUESTIONS 1-9: 0
SUM OF ALL RESPONSES TO PHQ QUESTIONS 1-9: 0
1. LITTLE INTEREST OR PLEASURE IN DOING THINGS: 0

## 2022-12-29 NOTE — PROGRESS NOTES
Called Dr. Yecenia Sumner office per Dr. Bailey Whittington to office staff Christi Duran. Informed her that Dr. Maria Isabel Mancuso would like Dr. Fatimah Rabago to see patient in the next couple of weeks due to bradycardia today with a heart rate of 49. Patient denied any dizziness or lightheadedness. Christi Duran stated she will schedule a visit for this patient within 2 weeks.

## 2022-12-29 NOTE — PROGRESS NOTES
[facility]           Radiation Oncology      [address] [facility]        [address]        Ida Chester: [phone]        F: [phone]       [email or web address]                   Dr. Nitza Rose MD PhD    ON TREATMENT VISIT (OTV) NOTE     Date of Service: 2022  Patient ID: Cathi Sierra   :   MRN: [id number]   Acct Number: [de-identified]     DIAGNOSIS:  Cancer Staging  Endometrial adenocarcinoma [facility])  Staging form: Corpus Uteri - Carcinoma And Carcinosarcoma, AJCC 8th Edition  - Clinical: Stage IVB (rcTX, cNX, cM1) - Signed by Nitza Rose MD on 2022      Treatment Area: Pelvis- Female    Current Total Dose(cGy): 600 cGy  Current Fraction: 3    Patient was seen today for weekly visit.      Wt Readings from Last 3 Encounters:   22 221 lb 9.6 oz (100.5 kg)   22 219 lb 6.4 oz (99.5 kg)   10/11/22 217 lb 9.6 oz (98.7 kg)       BP (!) 147/67   Pulse (!) 49   Temp 97.5 °F (36.4 °C)   Resp 18   Wt 221 lb 9.6 oz (100.5 kg)   LMP  (LMP Unknown)   SpO2 99%   BMI 34.71 kg/m²     Lab Results   Component Value Date    WBC 6.5 2022     2022       Comfort Alteration  Fatigue:None, able to perform daily activities    Pain Location: Chronic low back  Pain Intensity (Current): 4 Moderate pain  Pain Treatment: Tylenol as needed  Pain Relief: Pain relieved 50%    Emotional Alteration:   Coping: effective    Nutritional Alteration  Anorexia: none   Nausea: No nausea noted  Vomiting: No vomiting     Skin Alteration   Skin reaction: No changes noted    Elimination Alterations  Urinary Frequency:  Self cath every 3-4 hr  Urinary Retention: Attempts to urinate but usually needs to self cath  Urinary Incontinence: None  Dysuria: None  Nocturia: 3-5 times Monday and Tuesday and twice Wednesday night  Proctitis: None  Diarrhea: None    Additional Comments: Soft daily BM's    MEDICATIONS:     Current Outpatient Medications   Medication Sig Dispense Refill ONETOUCH ULTRA strip USE 1 STRIP TO CHECK GLUCOSE THREE TIMES DAILY 100 each 5    Lancets (ONETOUCH DELICA PLUS [id number]) MISC USE 1  TO CHECK GLUCOSE THREE TIMES DAILY 100 each 5    atorvastatin (LIPITOR) 10 MG tablet Take 1 tablet by mouth once daily 90 tablet 0    prochlorperazine (COMPAZINE) 10 MG tablet TAKE 1 TABLET BY MOUTH EVERY 6 HOURS AS NEEDED FOR NAUSEA      glipiZIDE (GLUCOTROL XL) 2.5 MG extended release tablet Take 2 tablets by mouth daily 180 tablet 0    gabapentin (NEURONTIN) 300 MG capsule Take 1 capsule by mouth 3 times daily for 30 days. 90 capsule 2    acetaminophen (TYLENOL) 500 MG tablet Take 1,000 mg by mouth every 6 hours as needed for Pain      ibuprofen (ADVIL;MOTRIN) 200 MG tablet Take 200 mg by mouth every 6 hours as needed for Pain (Patient not taking: No sig reported)      metoprolol succinate (TOPROL XL) 25 MG extended release tablet Take 1 tablet by mouth once daily 90 tablet 3    amLODIPine (NORVASC) 10 MG tablet Take 1 tablet by mouth once daily (Patient taking differently: 5 mg Take 1 tablet by mouth once daily) 90 tablet 3    nystatin (MYCOSTATIN) 993832 UNIT/GM cream Apply topically 2 times daily. 1 each 1    Handicap Placard MISC by Does not apply route Expires: 2027 1 each 0    tamsulosin (FLOMAX) 0.4 MG capsule Take 1 capsule by mouth daily 90 capsule 3    blood glucose monitor kit and supplies DX: diabetes mellitus. Test 3 time(s) daily - Ok to substitute per insurance 1 kit 1    VITAMIN D PO Take by mouth      CRANBERRY PO Take by mouth      Alcohol Swabs PADS DX: diabetes mellitus. Test 1-3 time(s) daily - Ok to substitute per insurance (1 each = 1 box) 1 each 5    aspirin 81 MG tablet Take 81 mg by mouth daily       No current facility-administered medications for this encounter.      * New    PHYSICAL EXAM:       ECO - Symptomatic but completely ambulatory (Restricted in physically strenuous activity but ambulatory and able to carry out work of a light or sedentary nature. For example, light housework, office work)     General: NAD, AO x 3, Mentation is clear with appropriate affect. HEENT: Normocephalic, atraumatic  Thorax:  Unlabored  Abdomen:  Non-distended    Chemotherapy Update: None    Treatment Imaging: CBCT    ASSESSMENT: Minimal radiation side effects. Responding appropriately to symptomatic management. New medications, diagnostic results: Continue treatment as planned    PLAN: Again reviewed potential side effects of radiation for the patient's treatment. Continue local/topical care. Patient mildly bradycardic today, but asymptomatic. She is being followed by Dr. Sammie Benitez with cardiology so will reach out to his office to make him aware. Continue current radiation course as prescribed.

## 2022-12-29 NOTE — PROGRESS NOTES
Subjective:      Patient ID: Gill Torres is a 79 y.o. female who presents today for:  Chief Complaint   Patient presents with    Follow-up     1 Year No Tests-Needs refill on Flomax and catheters       HPI  79year old female who presents for refills on her flomax straigt cath.  She uses 14 fr pre lubricated straight cath 7 times daily    Past Medical History:   Diagnosis Date    Allergic rhinitis     Asthma     Chest pain, unspecified 8/20/2018    Endometrial cancer, grade I ([facility] [address] 2011    [facility]    Hypertension     Kidney problem     blockage in urethra and urine backs up has to get urethra dilated, has scar tissue    Type 2 diabetes mellitus with chronic kidney disease, without long-term current use of insulin ([facility] [address] 6/21/2019     Past Surgical History:   Procedure Laterality Date    CATARACT REMOVAL Left 11/2020    CHOLECYSTECTOMY  1990    COLONOSCOPY N/A 8/10/2022    COLORECTAL CANCER SCREENING, NOT HIGH RISK performed by Ania Caceres MD at [address] [place] [address] ([address] [place] [address])  2011    endometrial cancer stage !a    URETHRAL STRICTURE DILATATION  2016    Dr. Shayy Luis History     Socioeconomic History    Marital status:      Spouse name: Not on file    Number of children: Not on file    Years of education: Not on file    Highest education level: Not on file   Occupational History    Not on file   Tobacco Use    Smoking status: Never    Smokeless tobacco: Never   Substance and Sexual Activity    Alcohol use: No    Drug use: No    Sexual activity: Not on file   Other Topics Concern    Not on file   Social History Narrative    Not on file     Social Determinants of Health     Financial Resource Strain: Low Risk     Difficulty of Paying Living Expenses: Not hard at all   Food Insecurity: No Food Insecurity    Worried About Running Out of Food in the Last Year: Never true    [address] in the Last Year: Never true   Transportation Needs: Not on file   Physical Activity: Insufficiently Active    Days of Exercise per Week: 3 days    Minutes of Exercise per Session: 10 min   Stress: Not on file   Social Connections: Not on file   Intimate Partner Violence: Not on file   Housing Stability: Not on file     Family History   Problem Relation Age of Onset    Rheum Arthritis Mother     Osteoporosis Mother     No Known Problems Father         complications afte gallbladder surgery    Osteoarthritis Sister     Osteoarthritis Sister     Coronary Art Dis Brother     Heart Surgery Brother     Colon Cancer Neg Hx      Allergies   Allergen Reactions    Ciprofloxacin      Current Outpatient Medications   Medication Sig Dispense Refill    tamsulosin (FLOMAX) 0.4 MG capsule Take 1 capsule by mouth daily 30 capsule 11    sulfamethoxazole-trimethoprim (BACTRIM DS) 800-160 MG per tablet Take 1 tablet by mouth 2 times daily for 10 days 20 tablet 0    ONETOUCH ULTRA strip USE 1 STRIP TO CHECK GLUCOSE THREE TIMES DAILY 100 each 5    Lancets (ONETOUCH DELICA PLUS [id number]) MISC USE 1  TO CHECK GLUCOSE THREE TIMES DAILY 100 each 5    atorvastatin (LIPITOR) 10 MG tablet Take 1 tablet by mouth once daily 90 tablet 0    glipiZIDE (GLUCOTROL XL) 2.5 MG extended release tablet Take 2 tablets by mouth daily 180 tablet 0    gabapentin (NEURONTIN) 300 MG capsule Take 1 capsule by mouth 3 times daily for 30 days. 90 capsule 2    acetaminophen (TYLENOL) 500 MG tablet Take 1,000 mg by mouth every 6 hours as needed for Pain      ibuprofen (ADVIL;MOTRIN) 200 MG tablet Take 200 mg by mouth every 6 hours as needed for Pain      metoprolol succinate (TOPROL XL) 25 MG extended release tablet Take 1 tablet by mouth once daily 90 tablet 3    amLODIPine (NORVASC) 10 MG tablet Take 1 tablet by mouth once daily (Patient taking differently: 5 mg Take 1 tablet by mouth once daily) 90 tablet 3    nystatin (MYCOSTATIN) 016743 UNIT/GM cream Apply topically 2 times daily.  1 each 1    Handicap Placard MISC by Does not apply route Expires: 7/9/2027 1 each 0    tamsulosin (FLOMAX) 0.4 MG capsule Take 1 capsule by mouth daily 90 capsule 3    blood glucose monitor kit and supplies DX: diabetes mellitus. Test 3 time(s) daily - Ok to substitute per insurance 1 kit 1    VITAMIN D PO Take by mouth      CRANBERRY PO Take by mouth      Alcohol Swabs PADS DX: diabetes mellitus. Test 1-3 time(s) daily - Ok to substitute per insurance (1 each = 1 box) 1 each 5    aspirin 81 MG tablet Take 81 mg by mouth daily      prochlorperazine (COMPAZINE) 10 MG tablet TAKE 1 TABLET BY MOUTH EVERY 6 HOURS AS NEEDED FOR NAUSEA (Patient not taking: Reported on 12/29/2022)       No current facility-administered medications for this visit. Review of Systems   Constitutional:  Negative for activity change, appetite change, chills, fever and unexpected weight change. HENT:  Negative for drooling, ear pain, nosebleeds, sinus pressure and trouble swallowing. Respiratory:  Negative for cough, chest tightness and shortness of breath. Cardiovascular:  Negative for chest pain and leg swelling. Gastrointestinal:  Negative for abdominal pain, diarrhea and vomiting. Endocrine: Negative for polydipsia and polyphagia. Genitourinary:  Negative for dysuria, flank pain and frequency. Musculoskeletal:  Negative for back pain and myalgias. Skin:  Negative for pallor and rash. Neurological:  Negative for syncope, weakness and headaches. Hematological:  Does not bruise/bleed easily. Psychiatric/Behavioral:  Negative for agitation, behavioral problems and confusion. All other systems reviewed and are negative. Objective:   /78   Pulse 55   Ht 5' 7\" (1.702 m)   Wt 218 lb (98.9 kg)   LMP  (LMP Unknown)   BMI 34.14 kg/m²     Physical Exam  Vitals and nursing note reviewed. Constitutional:       General: She is awake. She is not in acute distress. Appearance: Normal appearance. She is well-developed and normal weight.  She is not ill-appearing, toxic-appearing or diaphoretic. Comments: No photophobia. No phonophobia. HENT:      Head: Normocephalic and atraumatic. No Jones's sign. Right Ear: External ear normal.      Left Ear: External ear normal.      Nose: Nose normal. No congestion or rhinorrhea. Mouth/Throat:      Mouth: Mucous membranes are moist.      Pharynx: Oropharynx is clear. No oropharyngeal exudate or posterior oropharyngeal erythema. Eyes:      General: No scleral icterus. Right eye: No foreign body or discharge. Left eye: No discharge. Extraocular Movements: Extraocular movements intact. Conjunctiva/sclera: Conjunctivae normal.      Left eye: No exudate. Pupils: Pupils are equal, round, and reactive to light. Neck:      Vascular: No JVD. Trachea: No tracheal deviation. Comments: No meningismus. Cardiovascular:      Rate and Rhythm: Normal rate and regular rhythm. Heart sounds: Normal heart sounds. Heart sounds not distant. No murmur heard. No friction rub. No gallop. Pulmonary:      Effort: Pulmonary effort is normal. No respiratory distress. Breath sounds: Normal breath sounds. No stridor. No wheezing, rhonchi or rales. Chest:      Chest wall: No tenderness. Abdominal:      General: Abdomen is flat. Bowel sounds are normal. There is no distension. Palpations: Abdomen is soft. There is no mass. Tenderness: There is no abdominal tenderness. There is no right CVA tenderness, left CVA tenderness, guarding or rebound. Hernia: No hernia is present. Musculoskeletal:         General: No swelling, tenderness, deformity or signs of injury. Normal range of motion. Cervical back: Normal range of motion and neck supple. No rigidity. Lymphadenopathy:      Head:      Right side of head: No submental adenopathy. Left side of head: No submental adenopathy. Skin:     General: Skin is warm and dry.       Capillary Refill: Capillary refill takes less than 2 seconds. Coloration: Skin is not jaundiced or pale. Findings: No bruising, erythema, lesion or rash. Neurological:      General: No focal deficit present. Mental Status: She is alert and oriented to person, place, and time. Mental status is at baseline. Cranial Nerves: No cranial nerve deficit. Sensory: No sensory deficit. Motor: No weakness. Coordination: Coordination normal.      Deep Tendon Reflexes: Reflexes are normal and symmetric. Psychiatric:         Mood and Affect: Mood normal.         Behavior: Behavior normal. Behavior is cooperative. Thought Content: Thought content normal.         Judgment: Judgment normal.       Assessment:       Diagnosis Orders   1. Urinary retention  tamsulosin (FLOMAX) 0.4 MG capsule    sulfamethoxazole-trimethoprim (BACTRIM DS) 800-160 MG per tablet      2. Stricture of female urethra, unspecified stricture type  tamsulosin (FLOMAX) 0.4 MG capsule    sulfamethoxazole-trimethoprim (BACTRIM DS) 800-160 MG per tablet        No results found for this visit on 12/29/22. Plan:     Assessment & Plan   Chica Daly was seen today for follow-up. Diagnoses and all orders for this visit:    Urinary retention  -     tamsulosin (FLOMAX) 0.4 MG capsule; Take 1 capsule by mouth daily  -     sulfamethoxazole-trimethoprim (BACTRIM DS) 800-160 MG per tablet; Take 1 tablet by mouth 2 times daily for 10 days    Stricture of female urethra, unspecified stricture type  -     tamsulosin (FLOMAX) 0.4 MG capsule; Take 1 capsule by mouth daily  -     sulfamethoxazole-trimethoprim (BACTRIM DS) 800-160 MG per tablet; Take 1 tablet by mouth 2 times daily for 10 days    No orders of the defined types were placed in this encounter.     Orders Placed This Encounter   Medications    tamsulosin (FLOMAX) 0.4 MG capsule     Sig: Take 1 capsule by mouth daily     Dispense:  30 capsule     Refill:  11    sulfamethoxazole-trimethoprim (BACTRIM DS) 800-160 MG per tablet     Sig: Take 1 tablet by mouth 2 times daily for 10 days     Dispense:  20 tablet     Refill:  0     There are no discontinued medications. Return if symptoms worsen or fail to improve. Reviewed with the patient/family: current clinical status & medications. Side effects of the medication prescribed today, as well as treatment plan/rationale and result expectations have been discussed with the patient/family who expresses understanding. Patient will be discharged home in stable condition. Follow up with PCP to evaluate treatment results or return if symptoms worsen or fail to improve. Discussed signs and symptoms which require immediate follow-up in ED/call to 911. Understanding verbalized. I have reviewed the patient's medical history in detail and updated the computerized patient record.     TAMIA Monge

## 2022-12-30 ENCOUNTER — APPOINTMENT (OUTPATIENT)
Dept: RADIATION ONCOLOGY | Age: 67
End: 2022-12-30
Payer: MEDICARE

## 2022-12-30 PROCEDURE — 77386 HC NTSTY MODUL RAD TX DLVR CPLX: CPT | Performed by: RADIOLOGY

## 2023-01-03 ENCOUNTER — HOSPITAL ENCOUNTER (OUTPATIENT)
Dept: RADIATION ONCOLOGY | Age: 68
Discharge: HOME | End: 2023-01-03
Payer: MEDICARE

## 2023-01-03 PROCEDURE — 77386 HC NTSTY MODUL RAD TX DLVR CPLX: CPT | Performed by: RADIOLOGY

## 2023-01-03 PROCEDURE — 77014 CHG CT GUIDANCE RADIATION THERAPY FLDS PLACEMENT: CPT | Performed by: RADIOLOGY

## 2023-01-04 ENCOUNTER — HOSPITAL ENCOUNTER (OUTPATIENT)
Dept: RADIATION ONCOLOGY | Age: 68
Discharge: HOME | End: 2023-01-04
Payer: MEDICARE

## 2023-01-04 PROCEDURE — 77386 HC NTSTY MODUL RAD TX DLVR CPLX: CPT | Performed by: RADIOLOGY

## 2023-01-04 PROCEDURE — 77014 CHG CT GUIDANCE RADIATION THERAPY FLDS PLACEMENT: CPT | Performed by: RADIOLOGY

## 2023-01-05 ENCOUNTER — HOSPITAL ENCOUNTER (OUTPATIENT)
Dept: RADIATION ONCOLOGY | Age: 68
Discharge: HOME | End: 2023-01-05
Payer: MEDICARE

## 2023-01-05 VITALS
BODY MASS INDEX: 34.46 KG/M2 | TEMPERATURE: 97.2 F | SYSTOLIC BLOOD PRESSURE: 118 MMHG | HEART RATE: 53 BPM | RESPIRATION RATE: 16 BRPM | DIASTOLIC BLOOD PRESSURE: 57 MMHG | WEIGHT: 220 LBS | OXYGEN SATURATION: 98 %

## 2023-01-05 PROCEDURE — 77386 HC NTSTY MODUL RAD TX DLVR CPLX: CPT | Performed by: RADIOLOGY

## 2023-01-05 NOTE — PROGRESS NOTES
[facility]           Radiation Oncology      [address] [facility]        Enrique Alarcon Kumar: [phone]        F: [phone]       [email or web address]                   Dr. Jose Nix MD PhD    ON TREATMENT VISIT (OTV) NOTE     Date of Service: 2023  Patient ID: Jamshid Azevedo   : [birth year]  MRN: [id number]   Acct Number: [de-identified]     DIAGNOSIS:  Cancer Staging  Endometrial adenocarcinoma [facility])  Staging form: Corpus Uteri - Carcinoma And Carcinosarcoma, AJCC 8th Edition  - Clinical: Stage IVB (rcTX, cNX, cM1) - Signed by Jose Nix MD on 2022      Treatment Area: Pelvis- Female    Current Total Dose(cGy): 1400  Current Fraction: 7    Patient was seen today for weekly visit.      Wt Readings from Last 3 Encounters:   23 220 lb (99.8 kg)   22 221 lb 9.6 oz (100.5 kg)   22 218 lb (98.9 kg)       BP (!) 118/57   Pulse 53   Temp 97.2 °F (36.2 °C)   Resp 16   Wt 220 lb (99.8 kg)   LMP  (LMP Unknown)   SpO2 98%   BMI 34.46 kg/m²     Lab Results   Component Value Date    WBC 6.5 2022     2022       Comfort Alteration  Fatigue: feels a little tired in the afternoon    Pain Location: lower back, chronic  Pain Intensity (Current): 6 Severe pain  Pain Treatment: OTC Medications  Pain Relief: Pain relieved 50%    Emotional Alteration:   Coping: effective    Nutritional Alteration  Anorexia: none   Nausea: No nausea noted  Vomiting: No vomiting     Skin Alteration   Skin reaction: No changes noted, using aquaphor once    Elimination Alterations  Urinary Frequency: Urinating less than once an hour  Urinary Retention:  self caths routinely  Urinary Incontinence: None  Dysuria: None  Nocturia:  was up 4 times last night, varies  Proctitis: None  Diarrhea: None    Additional Comments: pt has been starting her fluids at 715, she will start at 700 am tomorrow to see if she can get bladder filled    MEDICATIONS:     Current Outpatient Medications   Medication Sig Dispense Refill    tamsulosin (FLOMAX) 0.4 MG capsule Take 1 capsule by mouth daily 30 capsule 11    sulfamethoxazole-trimethoprim (BACTRIM DS) 800-160 MG per tablet Take 1 tablet by mouth 2 times daily for 10 days 20 tablet 0    ONETOUCH ULTRA strip USE 1 STRIP TO CHECK GLUCOSE THREE TIMES DAILY 100 each 5    Lancets (ONETOUCH DELICA PLUS [id number]) MISC USE 1  TO CHECK GLUCOSE THREE TIMES DAILY 100 each 5    atorvastatin (LIPITOR) 10 MG tablet Take 1 tablet by mouth once daily 90 tablet 0    prochlorperazine (COMPAZINE) 10 MG tablet TAKE 1 TABLET BY MOUTH EVERY 6 HOURS AS NEEDED FOR NAUSEA (Patient not taking: No sig reported)      glipiZIDE (GLUCOTROL XL) 2.5 MG extended release tablet Take 2 tablets by mouth daily 180 tablet 0    gabapentin (NEURONTIN) 300 MG capsule Take 1 capsule by mouth 3 times daily for 30 days. 90 capsule 2    acetaminophen (TYLENOL) 500 MG tablet Take 1,000 mg by mouth every 6 hours as needed for Pain      ibuprofen (ADVIL;MOTRIN) 200 MG tablet Take 200 mg by mouth every 6 hours as needed for Pain      metoprolol succinate (TOPROL XL) 25 MG extended release tablet Take 1 tablet by mouth once daily 90 tablet 3    amLODIPine (NORVASC) 10 MG tablet Take 1 tablet by mouth once daily (Patient taking differently: 5 mg Take 1 tablet by mouth once daily) 90 tablet 3    nystatin (MYCOSTATIN) 587486 UNIT/GM cream Apply topically 2 times daily. 1 each 1    Handicap Placard MISC by Does not apply route Expires: 7/9/2027 1 each 0    tamsulosin (FLOMAX) 0.4 MG capsule Take 1 capsule by mouth daily 90 capsule 3    blood glucose monitor kit and supplies DX: diabetes mellitus. Test 3 time(s) daily - Ok to substitute per insurance 1 kit 1    VITAMIN D PO Take by mouth      CRANBERRY PO Take by mouth      Alcohol Swabs PADS DX: diabetes mellitus.  Test 1-3 time(s) daily - Ok to substitute per insurance (1 each = 1 box) 1 each 5    aspirin 81 MG tablet Take 81 mg by mouth daily No current facility-administered medications for this encounter. * New    PHYSICAL EXAM:       ECO - Symptomatic but completely ambulatory (Restricted in physically strenuous activity but ambulatory and able to carry out work of a light or sedentary nature. For example, light housework, office work)     General: NAD, AO x 3, Mentation is clear with appropriate affect. HEENT: Normocephalic, atraumatic  Thorax:  Unlabored  Abdomen:  Non-distended    Chemotherapy Update: None    Treatment Imaging: CBCT    ASSESSMENT: Minimal radiation side effects. Responding appropriately to symptomatic management. New medications, diagnostic results: Continue treatment as planned    PLAN: Again reviewed potential side effects of radiation for the patient's treatment. Continue local/topical care. Bladder was slightly underfilled today. Reinforced timing and duration of drinking water for bladder filling prior to each radiation treatment. Patient will start drinking water slightly earlier starting tomorrow to achieve full bladder for each radiation treatment. Continue current radiation course as prescribed.

## 2023-01-06 ENCOUNTER — HOSPITAL ENCOUNTER (OUTPATIENT)
Dept: RADIATION ONCOLOGY | Age: 68
Discharge: HOME | End: 2023-01-06
Payer: MEDICARE

## 2023-01-06 ENCOUNTER — OFFICE VISIT (OUTPATIENT)
Dept: CARDIOLOGY CLINIC | Age: 68
End: 2023-01-06
Payer: MEDICARE

## 2023-01-06 VITALS
WEIGHT: 223.6 LBS | OXYGEN SATURATION: 100 % | SYSTOLIC BLOOD PRESSURE: 112 MMHG | DIASTOLIC BLOOD PRESSURE: 60 MMHG | HEART RATE: 56 BPM | BODY MASS INDEX: 35.02 KG/M2

## 2023-01-06 DIAGNOSIS — R00.2 PALPITATION: ICD-10-CM

## 2023-01-06 DIAGNOSIS — N18.31 TYPE 2 DIABETES MELLITUS WITH STAGE 3A CHRONIC KIDNEY DISEASE, WITHOUT LONG-TERM CURRENT USE OF INSULIN (HCC): ICD-10-CM

## 2023-01-06 DIAGNOSIS — R01.1 MURMUR, CARDIAC: ICD-10-CM

## 2023-01-06 DIAGNOSIS — E78.5 DYSLIPIDEMIA: ICD-10-CM

## 2023-01-06 DIAGNOSIS — R00.1 BRADYCARDIA: Primary | ICD-10-CM

## 2023-01-06 DIAGNOSIS — I10 ESSENTIAL HYPERTENSION: ICD-10-CM

## 2023-01-06 DIAGNOSIS — E11.22 TYPE 2 DIABETES MELLITUS WITH STAGE 3A CHRONIC KIDNEY DISEASE, WITHOUT LONG-TERM CURRENT USE OF INSULIN (HCC): ICD-10-CM

## 2023-01-06 PROCEDURE — 93000 ELECTROCARDIOGRAM COMPLETE: CPT | Performed by: INTERNAL MEDICINE

## 2023-01-06 PROCEDURE — 99214 OFFICE O/P EST MOD 30 MIN: CPT | Performed by: INTERNAL MEDICINE

## 2023-01-06 PROCEDURE — 3074F SYST BP LT 130 MM HG: CPT | Performed by: INTERNAL MEDICINE

## 2023-01-06 PROCEDURE — 1123F ACP DISCUSS/DSCN MKR DOCD: CPT | Performed by: INTERNAL MEDICINE

## 2023-01-06 PROCEDURE — 77386 HC NTSTY MODUL RAD TX DLVR CPLX: CPT | Performed by: RADIOLOGY

## 2023-01-06 PROCEDURE — 3078F DIAST BP <80 MM HG: CPT | Performed by: INTERNAL MEDICINE

## 2023-01-06 ASSESSMENT — ENCOUNTER SYMPTOMS
EYES NEGATIVE: 1
STRIDOR: 0
BLOOD IN STOOL: 0
CHEST TIGHTNESS: 0
COUGH: 0
WHEEZING: 0
NAUSEA: 0
SHORTNESS OF BREATH: 0
RESPIRATORY NEGATIVE: 1
GASTROINTESTINAL NEGATIVE: 1

## 2023-01-06 NOTE — PROGRESS NOTES
Subsequent Progress Note    Patient: Edinson Yadav  YOB: 1955  MRN: [id number]    Chief Complaint: HTN Palpitations. Chief Complaint   Patient presents with    Bradycardia     [facility] [address] [address]; p- 49 bpm        CV Data:  4/21 PVR negative  5/21 echo ef 50   9/2018 spect negative     Subjective/HPI:  Doing well no  Cp no sob rare palps no bleed no falls active    1/6/23 sent for Bradycardia at [place] office. HR 49. ECG today HR 54. Pt has little dizziness but no so nor CP no bleed. No falls. Receiving Chemo XRT for Endometrial CA.       EKG: SB 47    Anna's mother-in- law  Nonsmoker  No etoh  Lives with     Past Medical History:   Diagnosis Date    Allergic rhinitis     Asthma     Chest pain, unspecified 8/20/2018    Endometrial cancer, grade I ([facility] [address] 2011    [facility]    Hypertension     Kidney problem     blockage in urethra and urine backs up has to get urethra dilated, has scar tissue    Type 2 diabetes mellitus with chronic kidney disease, without long-term current use of insulin ([facility] [address] 6/21/2019       Past Surgical History:   Procedure Laterality Date    CATARACT REMOVAL Left 11/2020    CHOLECYSTECTOMY  1990    COLONOSCOPY N/A 8/10/2022    COLORECTAL CANCER SCREENING, NOT HIGH RISK performed by Samuel Suarez MD at [address] [employer] ([address] [place] [address])  2011    endometrial cancer stage !a    URETHRAL STRICTURE DILATATION  2016    Dr. Sidney Felix        Family History   Problem Relation Age of Onset    Rheum Arthritis Mother     Osteoporosis Mother     No Known Problems Father         complications afte gallbladder surgery    Osteoarthritis Sister     Osteoarthritis Sister     Coronary Art Dis Brother     Heart Surgery Brother     Colon Cancer Neg Hx        Social History     Socioeconomic History    Marital status:      Spouse name: None    Number of children: None    Years of education: None    Highest education level: None   Tobacco Use    Smoking status: Never    Smokeless tobacco: Never   Substance and Sexual Activity    Alcohol use: No    Drug use: No     Social Determinants of Health     Financial Resource Strain: Low Risk     Difficulty of Paying Living Expenses: Not hard at all   Food Insecurity: No Food Insecurity    Worried About Running Out of Food in the Last Year: Never true    Ran Out of Food in the Last Year: Never true   Physical Activity: Insufficiently Active    Days of Exercise per Week: 3 days    Minutes of Exercise per Session: 10 min       Allergies   Allergen Reactions    Ciprofloxacin        Current Outpatient Medications   Medication Sig Dispense Refill    tamsulosin (FLOMAX) 0.4 MG capsule Take 1 capsule by mouth daily 30 capsule 11    sulfamethoxazole-trimethoprim (BACTRIM DS) 800-160 MG per tablet Take 1 tablet by mouth 2 times daily for 10 days 20 tablet 0    ONETOUCH ULTRA strip USE 1 STRIP TO CHECK GLUCOSE THREE TIMES DAILY 100 each 5    Lancets (ONETOUCH DELICA PLUS [id number]) MISC USE 1  TO CHECK GLUCOSE THREE TIMES DAILY 100 each 5    atorvastatin (LIPITOR) 10 MG tablet Take 1 tablet by mouth once daily 90 tablet 0    prochlorperazine (COMPAZINE) 10 MG tablet TAKE 1 TABLET BY MOUTH EVERY 6 HOURS AS NEEDED FOR NAUSEA      glipiZIDE (GLUCOTROL XL) 2.5 MG extended release tablet Take 2 tablets by mouth daily 180 tablet 0    acetaminophen (TYLENOL) 500 MG tablet Take 1,000 mg by mouth every 6 hours as needed for Pain      ibuprofen (ADVIL;MOTRIN) 200 MG tablet Take 200 mg by mouth every 6 hours as needed for Pain      amLODIPine (NORVASC) 10 MG tablet Take 1 tablet by mouth once daily (Patient taking differently: 5 mg Take 1 tablet by mouth once daily) 90 tablet 3    nystatin (MYCOSTATIN) 379576 UNIT/GM cream Apply topically 2 times daily.  1 each 1    Handicap Placard MISC by Does not apply route Expires: 7/9/2027 1 each 0    tamsulosin (FLOMAX) 0.4 MG capsule Take 1 capsule by mouth daily 90 capsule 3    blood glucose monitor kit and supplies DX: diabetes mellitus. Test 3 time(s) daily - Ok to substitute per insurance 1 kit 1    VITAMIN D PO Take by mouth      CRANBERRY PO Take by mouth      Alcohol Swabs PADS DX: diabetes mellitus. Test 1-3 time(s) daily - Ok to substitute per insurance (1 each = 1 box) 1 each 5    aspirin 81 MG tablet Take 81 mg by mouth daily      gabapentin (NEURONTIN) 300 MG capsule Take 1 capsule by mouth 3 times daily for 30 days. 90 capsule 2     No current facility-administered medications for this visit. Review of Systems:   Review of Systems   Constitutional: Negative. Negative for diaphoresis and fatigue. HENT: Negative. Eyes: Negative. Respiratory: Negative. Negative for cough, chest tightness, shortness of breath, wheezing and stridor. Cardiovascular: Negative. Negative for chest pain, palpitations and leg swelling. Gastrointestinal: Negative. Negative for blood in stool and nausea. Genitourinary: Negative. Musculoskeletal: Negative. Skin: Negative. Neurological: Negative. Negative for dizziness, syncope, weakness and light-headedness. Hematological: Negative. Psychiatric/Behavioral: Negative. Physical Examination:    /60 (Site: Left Upper Arm, Position: Sitting, Cuff Size: Large Adult)   Pulse 56   Wt 223 lb 9.6 oz (101.4 kg)   LMP  (LMP Unknown)   SpO2 100%   BMI 35.02 kg/m²    Physical Exam   Constitutional: She appears healthy. No distress. HENT:   Normal cephalic and Atraumatic   Eyes: Pupils are equal, round, and reactive to light. Neck: Thyroid normal. No JVD present. No neck adenopathy. No thyromegaly present. Cardiovascular: Normal rate, regular rhythm, normal heart sounds, intact distal pulses and normal pulses. Pulmonary/Chest: Effort normal and breath sounds normal. She has no wheezes. She has no rales. She exhibits no tenderness. Abdominal: Soft. Bowel sounds are normal. There is no abdominal tenderness.    Musculoskeletal: General: No tenderness or edema. Normal range of motion. Cervical back: Normal range of motion and neck supple. Neurological: She is alert and oriented to person, place, and time. Skin: Skin is warm. No cyanosis. Nails show no clubbing.      LABS:  CBC:   Lab Results   Component Value Date/Time    WBC 6.5 05/07/2022 07:39 AM    RBC 4.00 05/07/2022 07:39 AM    HGB 12.9 05/07/2022 07:39 AM    HCT 39.4 05/07/2022 07:39 AM    MCV 98.4 05/07/2022 07:39 AM    MCH 32.3 05/07/2022 07:39 AM    MCHC 32.9 05/07/2022 07:39 AM    RDW 12.7 05/07/2022 07:39 AM     05/07/2022 07:39 AM    MPV 7.5 10/12/2015 10:25 AM     Lipids:  Lab Results   Component Value Date    CHOL 164 08/05/2021    CHOL 162 06/19/2020    CHOL 209 (H) 08/10/2018     Lab Results   Component Value Date    TRIG 95 08/05/2021    TRIG 115 06/19/2020    TRIG 157 08/10/2018     Lab Results   Component Value Date    HDL 55 05/07/2022    HDL 67 (H) 08/05/2021    HDL 64 (H) 06/19/2020     Lab Results   Component Value Date    LDLCALC 92 05/07/2022    LDLCALC 78 08/05/2021    LDLCALC 75 06/19/2020     No results found for: LABVLDL, VLDL  No results found for: CHOLHDLRATIO  CMP:    Lab Results   Component Value Date/Time     12/14/2022 12:51 PM    K 4.7 12/14/2022 12:51 PM     12/14/2022 12:51 PM    CO2 25 12/14/2022 12:51 PM    BUN 17 12/14/2022 12:51 PM    CREATININE 1.20 12/14/2022 12:51 PM    GFRAA 43.6 05/07/2022 07:39 AM    LABGLOM 49.5 12/14/2022 12:51 PM    GLUCOSE 150 12/14/2022 12:51 PM    PROT 7.0 05/07/2022 07:39 AM    LABALBU 3.9 12/14/2022 12:51 PM    CALCIUM 9.6 12/14/2022 12:51 PM    BILITOT 0.6 05/07/2022 07:39 AM    ALKPHOS 75 05/07/2022 07:39 AM    AST 14 05/07/2022 07:39 AM    ALT 7 05/07/2022 07:39 AM     BMP:    Lab Results   Component Value Date/Time     12/14/2022 12:51 PM    K 4.7 12/14/2022 12:51 PM     12/14/2022 12:51 PM    CO2 25 12/14/2022 12:51 PM    BUN 17 12/14/2022 12:51 PM    LABALBU 3.9 12/14/2022 12:51 PM    CREATININE 1.20 12/14/2022 12:51 PM    CALCIUM 9.6 12/14/2022 12:51 PM    GFRAA 43.6 05/07/2022 07:39 AM    LABGLOM 49.5 12/14/2022 12:51 PM    GLUCOSE 150 12/14/2022 12:51 PM     Magnesium:    Lab Results   Component Value Date/Time    MG 2.2 03/19/2021 07:36 AM     TSH:No results found for: TSHFT4, TSH    Patient Active Problem List   Diagnosis    Urethral stricture    Urinary retention    Renal insufficiency syndrome    Renal failure syndrome    Renal cyst    Pancreas cyst    Osteoarthrosis involving lower leg    Essential hypertension    Asthma    Allergic rhinitis    Chest pain, unspecified    Type 2 diabetes mellitus with chronic kidney disease, without long-term current use of insulin ([facility])    Arteriosclerosis of coronary artery    Stage 4 chronic kidney disease ([facility] [address]    Uterine cancer ([facility] [address]    Endometrial adenocarcinoma ([facility] [address]       Medications Discontinued During This Encounter   Medication Reason    metoprolol succinate (TOPROL XL) 25 MG extended release tablet        Modified Medications    No medications on file       No orders of the defined types were placed in this encounter. Assessment/Plan:    1. Type 2 diabetes mellitus with stage 3a chronic kidney disease, without long-term current use of insulin (HCC)     - atorvastatin (LIPITOR) 10 MG tablet; Take 1 tablet by mouth daily  Dispense: 90 tablet; Refill: 1    2. Essential hypertension   stable continue meds. Low salt   - amLODIPine (NORVASC) 10 MG tablet; Take 1 tablet by mouth once daily  Dispense: 90 tablet; Refill: 1    3. Palpitation  Stable     4. Dyslipidemia  Continue statin. Low fat diet. Recent profile excellent. 5. SB- dc Toprol XL 25. If stable RTO 4 months       Counseling:  Heart Healthy Lifestyle, Low Salt Diet, Take Precautions to Prevent Falls and Walk Daily    Return in about 4 months (around 5/6/2023).       Electronically signed by Jan Akers MD on 1/6/2023 at 2:15 PM

## 2023-01-09 ENCOUNTER — HOSPITAL ENCOUNTER (OUTPATIENT)
Dept: RADIATION ONCOLOGY | Age: 68
Discharge: HOME | End: 2023-01-09
Payer: MEDICARE

## 2023-01-10 ENCOUNTER — HOSPITAL ENCOUNTER (OUTPATIENT)
Dept: RADIATION ONCOLOGY | Age: 68
Discharge: HOME | End: 2023-01-10
Payer: MEDICARE

## 2023-01-10 PROCEDURE — 77014 CHG CT GUIDANCE RADIATION THERAPY FLDS PLACEMENT: CPT | Performed by: RADIOLOGY

## 2023-01-10 PROCEDURE — 77386 HC NTSTY MODUL RAD TX DLVR CPLX: CPT | Performed by: RADIOLOGY

## 2023-01-10 PROCEDURE — 77336 RADIATION PHYSICS CONSULT: CPT | Performed by: RADIOLOGY

## 2023-01-11 ENCOUNTER — HOSPITAL ENCOUNTER (OUTPATIENT)
Dept: RADIATION ONCOLOGY | Age: 68
Discharge: HOME | End: 2023-01-11
Payer: MEDICARE

## 2023-01-11 PROCEDURE — 77014 CHG CT GUIDANCE RADIATION THERAPY FLDS PLACEMENT: CPT | Performed by: RADIOLOGY

## 2023-01-11 PROCEDURE — 77386 HC NTSTY MODUL RAD TX DLVR CPLX: CPT | Performed by: RADIOLOGY

## 2023-01-12 ENCOUNTER — HOSPITAL ENCOUNTER (OUTPATIENT)
Dept: RADIATION ONCOLOGY | Age: 68
Discharge: HOME | End: 2023-01-12
Payer: MEDICARE

## 2023-01-12 VITALS
RESPIRATION RATE: 16 BRPM | SYSTOLIC BLOOD PRESSURE: 138 MMHG | DIASTOLIC BLOOD PRESSURE: 71 MMHG | OXYGEN SATURATION: 98 % | TEMPERATURE: 97.5 F | BODY MASS INDEX: 34.64 KG/M2 | HEART RATE: 67 BPM | WEIGHT: 221.2 LBS

## 2023-01-12 PROCEDURE — 77014 CHG CT GUIDANCE RADIATION THERAPY FLDS PLACEMENT: CPT | Performed by: RADIOLOGY

## 2023-01-12 PROCEDURE — 77386 HC NTSTY MODUL RAD TX DLVR CPLX: CPT | Performed by: RADIOLOGY

## 2023-01-12 RX ORDER — LOPERAMIDE HYDROCHLORIDE 2 MG/1
2 CAPSULE ORAL 4 TIMES DAILY PRN
COMMUNITY

## 2023-01-12 NOTE — PROGRESS NOTES
[facility]           Radiation Oncology      [address] [facility]        Enrique Alarcon [phone]        F: [phone]       [employer]                   Dr. Sumi Carter MD PhD    ON TREATMENT VISIT (OTV) NOTE     Date of Service: 2023  Patient ID: Melchor Pandya   :   MRN: [id number]   Acct Number: [de-identified]     DIAGNOSIS:  Cancer Staging  Endometrial adenocarcinoma [facility])  Staging form: Corpus Uteri - Carcinoma And Carcinosarcoma, AJCC 8th Edition  - Clinical: Stage IVB (rcTX, cNX, cM1) - Signed by Sumi Carter MD on 2022      Treatment Area: Pelvis- Female    Current Total Dose(cGy): 2400  Current Fraction: 12    Patient was seen today for weekly visit. Wt Readings from Last 3 Encounters:   23 221 lb 3.2 oz (100.3 kg)   23 223 lb 9.6 oz (101.4 kg)   23 220 lb (99.8 kg)       /71   Pulse 67   Temp 97.5 °F (36.4 °C)   Resp 16   Wt 221 lb 3.2 oz (100.3 kg)   LMP  (LMP Unknown)   SpO2 98%   BMI 34.64 kg/m²     Lab Results   Component Value Date    WBC 6.5 2022     2022       Comfort Alteration  Fatigue:Able to perform daily activities with rest periods    Pain Location: lower back  Pain Intensity (Current): 5 Between moderate and severe pain  Pain Treatment: OTC Medications  Pain Relief: Pain relieved 50%    Emotional Alteration:   Coping: effective    Nutritional Alteration  Anorexia: none   Nausea: No nausea noted  Vomiting: No vomiting     Skin Alteration   Skin reaction: No changes noted    Elimination Alterations  Urinary Frequency: None  Urinary Retention:  self caths  Urinary Incontinence: None  Dysuria: None  Nocturia: 1-3 times nightly, twice last night, takes tamsulosin  Proctitis: None  Diarrhea:  had a watery stool last week, took imodium AD x1 with good results    Additional Comments: [place]     MEDICATIONS:     Current Outpatient Medications   Medication Sig Dispense Refill tamsulosin (FLOMAX) 0.4 MG capsule Take 1 capsule by mouth daily 30 capsule 11    ONETOUCH ULTRA strip USE 1 STRIP TO CHECK GLUCOSE THREE TIMES DAILY 100 each 5    Lancets (ONETOUCH DELICA PLUS [id number]) MISC USE 1  TO CHECK GLUCOSE THREE TIMES DAILY 100 each 5    atorvastatin (LIPITOR) 10 MG tablet Take 1 tablet by mouth once daily 90 tablet 0    prochlorperazine (COMPAZINE) 10 MG tablet TAKE 1 TABLET BY MOUTH EVERY 6 HOURS AS NEEDED FOR NAUSEA      glipiZIDE (GLUCOTROL XL) 2.5 MG extended release tablet Take 2 tablets by mouth daily 180 tablet 0    gabapentin (NEURONTIN) 300 MG capsule Take 1 capsule by mouth 3 times daily for 30 days. 90 capsule 2    acetaminophen (TYLENOL) 500 MG tablet Take 1,000 mg by mouth every 6 hours as needed for Pain      ibuprofen (ADVIL;MOTRIN) 200 MG tablet Take 200 mg by mouth every 6 hours as needed for Pain      amLODIPine (NORVASC) 10 MG tablet Take 1 tablet by mouth once daily (Patient taking differently: 5 mg Take 1 tablet by mouth once daily) 90 tablet 3    nystatin (MYCOSTATIN) 968385 UNIT/GM cream Apply topically 2 times daily. 1 each 1    Handicap Placard MISC by Does not apply route Expires: 2027 1 each 0    tamsulosin (FLOMAX) 0.4 MG capsule Take 1 capsule by mouth daily 90 capsule 3    blood glucose monitor kit and supplies DX: diabetes mellitus. Test 3 time(s) daily - Ok to substitute per insurance 1 kit 1    VITAMIN D PO Take by mouth      CRANBERRY PO Take by mouth      Alcohol Swabs PADS DX: diabetes mellitus. Test 1-3 time(s) daily - Ok to substitute per insurance (1 each = 1 box) 1 each 5    aspirin 81 MG tablet Take 81 mg by mouth daily       No current facility-administered medications for this encounter. * New    PHYSICAL EXAM:       ECO - Symptomatic but completely ambulatory (Restricted in physically strenuous activity but ambulatory and able to carry out work of a light or sedentary nature.  For example, light housework, office work)     General: NAD, AO x 3, Mentation is clear with appropriate affect. HEENT: Normocephalic, atraumatic  Thorax:  Unlabored  Abdomen:  Non-distended    Chemotherapy Update: None    Treatment Imaging: CBCT    ASSESSMENT: Minimal radiation side effects. Responding appropriately to symptomatic management. New medications, diagnostic results: Continue treatment as planned    PLAN: Again reviewed potential side effects of radiation for the patient's treatment. Continue local/topical care. Patient reported 1 episode of loose stools last week but this may have been diet related. She otherwise has not had any diarrhea this week. She denies any other complaints and is doing well. I did review with her the plans for restimulation the week of 1/23/2023 for adaptive radiation. She is amenable to this. I will update GYN oncology of this plan once we get closer to finishing her radiation treatment course so that we can arrange for follow-up and imaging to assess response. Continue current radiation course as prescribed.

## 2023-01-13 ENCOUNTER — HOSPITAL ENCOUNTER (OUTPATIENT)
Dept: RADIATION ONCOLOGY | Age: 68
Discharge: HOME | End: 2023-01-13
Payer: MEDICARE

## 2023-01-13 PROCEDURE — 77386 HC NTSTY MODUL RAD TX DLVR CPLX: CPT | Performed by: RADIOLOGY

## 2023-01-16 ENCOUNTER — APPOINTMENT (OUTPATIENT)
Dept: RADIATION ONCOLOGY | Age: 68
End: 2023-01-16
Payer: MEDICARE

## 2023-01-17 ENCOUNTER — APPOINTMENT (OUTPATIENT)
Dept: RADIATION ONCOLOGY | Age: 68
End: 2023-01-17
Payer: MEDICARE

## 2023-01-17 DIAGNOSIS — N18.31 TYPE 2 DIABETES MELLITUS WITH STAGE 3A CHRONIC KIDNEY DISEASE, WITHOUT LONG-TERM CURRENT USE OF INSULIN (HCC): ICD-10-CM

## 2023-01-17 DIAGNOSIS — E11.22 TYPE 2 DIABETES MELLITUS WITH STAGE 3A CHRONIC KIDNEY DISEASE, WITHOUT LONG-TERM CURRENT USE OF INSULIN (HCC): ICD-10-CM

## 2023-01-17 PROCEDURE — 77014 CHG CT GUIDANCE RADIATION THERAPY FLDS PLACEMENT: CPT | Performed by: RADIOLOGY

## 2023-01-17 PROCEDURE — 77336 RADIATION PHYSICS CONSULT: CPT | Performed by: RADIOLOGY

## 2023-01-17 PROCEDURE — 77386 HC NTSTY MODUL RAD TX DLVR CPLX: CPT | Performed by: RADIOLOGY

## 2023-01-18 ENCOUNTER — APPOINTMENT (OUTPATIENT)
Dept: RADIATION ONCOLOGY | Age: 68
End: 2023-01-18
Payer: MEDICARE

## 2023-01-18 PROCEDURE — 77386 HC NTSTY MODUL RAD TX DLVR CPLX: CPT | Performed by: RADIOLOGY

## 2023-01-18 PROCEDURE — 77014 CHG CT GUIDANCE RADIATION THERAPY FLDS PLACEMENT: CPT | Performed by: RADIOLOGY

## 2023-01-18 RX ORDER — GLIPIZIDE 2.5 MG/1
TABLET, EXTENDED RELEASE ORAL
Qty: 180 TABLET | Refills: 1 | Status: SHIPPED | OUTPATIENT
Start: 2023-01-18

## 2023-01-18 NOTE — TELEPHONE ENCOUNTER
Comments:     Last Office Visit (last PCP visit):   10/11/2022    Next Visit Date:  Future Appointments   Date Time Provider Dawna Biggs   1/18/2023  9:00 AM SCHEDULE, [facility] RAD ONC LINAC 2 [facility] ON[facility] [place] [facility]   1/19/2023  9:00 AM SCHEDULE, [facility] RAD ONC LIN[facility] 2 [facility] ON[facility] [place] HOD   1/20/2023  9:00 AM SCHEDULE, M[facility] RAD ONC LIN[facility] 2 [facility] ON[facility] [place] HOD   1/23/2023  9:00 AM SCHEDULE, [facility] RAD ONC LINAC 2 [facility] ON[facility] [place] HOD   1/23/2023  9:30 AM SCHEDULE, MLOZ CT SIM [facility] [place] [facility]OD   1/24/2023  9:00 AM SCHEDULE, M[facility] RAD ONC LINAC 2 [facility] ON[facility] [place] HOD   1/25/2023  9:00 AM SCHEDULE, M[facility] RAD ONC LINAC 2 [facility] ON[facility] [place] HOD   1/26/2023  9:00 AM SCHEDULE, M[facility] RAD ONC LINAC 2 [facility] ONC [place] HOD   1/27/2023  9:00 AM SCHEDULE, M[facility] RAD ONC LINAC 2 [facility] ON[facility] [place] HOD   1/30/2023  9:00 AM SCHEDULE, [facility] RAD ONC LINAC 2 [facility] ON[facility] [place] HOD   1/31/2023  9:00 AM SCHEDULE, M[facility] RAD ONC LINAC 2 [facility] ON[facility] [place] HOD   2/1/2023  9:00 AM SCHEDULE, M[facility] RAD ONC LINAC 2 [facility] ON[facility] [place] HOD   2/2/2023  9:00 AM SCHEDULE, M[facility] RAD ONC LINAC 2 [facility] ON[facility] [place] HOD   2/3/2023  9:00 AM SCHEDULE, M[facility] RAD ONC LINAC 2 [facility] RAD ON[facility] [place] HOD   2/6/2023  9:00 AM SCHEDULE, M[facility] RAD ONC LINAC 2 [facility] ON[facility] [place] HOD   2/7/2023  9:00 AM SCHEDULE, M[facility] RAD ONC LINAC 2 [facility] ON[facility] [place] HOD   2/8/2023  9:00 AM SCHEDULE, [facility] RAD ONC LINAC 2 [facility] ON[facility] [place] HOD   2/9/2023  9:00 AM SCHEDULE, [facility] RAD ONC LINAC 2 [facility] ON[facility] [place] HOD   2/10/2023  9:00 AM SCHEDULE, [facility] RAD ONC LINAC 2 [facility] ON[facility] [place] HOD   2/10/2023  1:15 PM Elver Espinoza MD [facility] [place]   2/13/2023  9:00 AM SCHEDULE, [facility] RAD ONC LINAC 2 [facility] ON[facility] [place] HOD   2/14/2023  9:00 AM SCHEDULE, [facility] RAD ONC LIN[facility] 2 [facility] [place] [facility]OD   5/12/2023 12:30 PM [facility] [place]   12/29/2023 10:30 AM TAMIA Dixon [facility]       **If hasn't been seen in over a year OR hasn't followed up according to last diabetes/ADHD visit, make appointment for patient before sending refill to provider.     Rx requested:  Requested Prescriptions     Pending Prescriptions Disp Refills    glipiZIDE (GLUCOTROL XL) 2.5 MG extended release tablet [Pharmacy Med Name: glipiZIDE ER 2.5 MG Oral Tablet Extended Release 24 Hour] 180 tablet 0     Sig: Take 2 tablets by mouth once daily

## 2023-01-19 ENCOUNTER — HOSPITAL ENCOUNTER (OUTPATIENT)
Dept: RADIATION ONCOLOGY | Age: 68
Discharge: HOME | End: 2023-01-19
Payer: MEDICARE

## 2023-01-19 ENCOUNTER — APPOINTMENT (OUTPATIENT)
Dept: RADIATION ONCOLOGY | Age: 68
End: 2023-01-19
Payer: MEDICARE

## 2023-01-19 VITALS
BODY MASS INDEX: 34.87 KG/M2 | TEMPERATURE: 97.6 F | SYSTOLIC BLOOD PRESSURE: 142 MMHG | WEIGHT: 222.66 LBS | RESPIRATION RATE: 16 BRPM | HEART RATE: 67 BPM | DIASTOLIC BLOOD PRESSURE: 88 MMHG | OXYGEN SATURATION: 99 %

## 2023-01-19 DIAGNOSIS — M54.16 LUMBAR RADICULOPATHY: ICD-10-CM

## 2023-01-19 DIAGNOSIS — C55 MALIGNANT NEOPLASM OF UTERUS, UNSPECIFIED SITE (HCC): Primary | ICD-10-CM

## 2023-01-19 PROCEDURE — 77386 HC NTSTY MODUL RAD TX DLVR CPLX: CPT | Performed by: RADIOLOGY

## 2023-01-19 PROCEDURE — 77014 CHG CT GUIDANCE RADIATION THERAPY FLDS PLACEMENT: CPT | Performed by: RADIOLOGY

## 2023-01-19 NOTE — TELEPHONE ENCOUNTER
Pharmacy is requesting medication refill. Please approve or deny this request.    Rx requested:  Requested Prescriptions     Pending Prescriptions Disp Refills    gabapentin (NEURONTIN) 300 MG capsule [Pharmacy Med Name: Gabapentin 300 MG Oral Capsule] 90 capsule 0     Sig: Take 1 capsule by mouth 3 times daily for 30 days.          Last Office Visit:   10/11/2022      Next Visit Date:  Future Appointments   Date Time Provider Dawna Biggs   1/19/2023  9:00 AM SCHEDULE, [facility] RAD ONC LINAC 2 [facility] RAD ONC [place] HOD   1/19/2023  9:15 AM Kim Bagley MD [facility] [place] [facility]   1/20/2023  9:00 AM SCHEDULE, [facility] RAD ONC LIN[facility] 2 [facility] RAD ON[facility] [place] [facility]OD   1/23/2023  9:00 AM SCHEDULE, [facility] RAD ONC LINAC 2 [facility] RAD ONC [place] HOD   1/23/2023  9:30 AM SCHEDULE, MLOZ CT SIM [facility] ON[facility] [place] [facility]OD   1/24/2023  9:00 AM SCHEDULE, MLO[facility] RAD ONC LINAC 2 [facility] RAD ONC [place] HOD   1/25/2023  9:00 AM SCHEDULE, M[facility] RAD ONC LINAC 2 [facility] RAD ONC [place] HOD   1/26/2023  9:00 AM SCHEDULE, MLO[facility] RAD ONC LINAC 2 [facility] RAD ONC [place] HOD   1/27/2023  9:00 AM SCHEDULE, MLO[facility] RAD ONC LINAC 2 [facility] RAD ONC [place] HOD   1/30/2023  9:00 AM SCHEDULE, MLO[facility] RAD ONC LINAC 2 [facility] RAD ONC [place] HOD   1/31/2023  9:00 AM SCHEDULE, M[facility] RAD ONC LINAC 2 [facility] RAD ONC [place] HOD   2/1/2023  9:00 AM SCHEDULE, M[facility] RAD ONC LINAC 2 [facility] RAD ONC [place] HOD   2/2/2023  9:00 AM SCHEDULE, MLO[facility] RAD ONC LINAC 2 [facility] RAD ONC [place] HOD   2/3/2023  9:00 AM SCHEDULE, MLO[facility] RAD ONC LINAC 2 [facility] RAD ONC [place] HOD   2/6/2023  9:00 AM SCHEDULE, MLOZ RAD ONC LINAC 2 [facility] RAD ONC [place] HOD   2/7/2023  9:00 AM SCHEDULE, MLOZ RAD ONC LINAC 2 [facility] RAD ONC [place] HOD   2/8/2023  9:00 AM SCHEDULE, MLO[facility] RAD ONC LIN[facility] 2 [facility] RAD ONC [place] HOD   2/9/2023  9:00 AM SCHEDULE, MLOZ RAD ONC LIN[facility] 2 [facility] RAD ON[facility] [place] HOD   2/10/2023  9:00 AM SCHEDULE, MLO[facility] RAD ONC LIN[facility] 2 [facility] RAD ONC [place] [facility]   2/10/2023  1:15 PM Rolla Boast, MD [facility]   2/13/2023  9:00 AM

## 2023-01-19 NOTE — PROGRESS NOTES
[facility]           Radiation Oncology      [address] [facility] [address]        Rosa Isela Blacwkell: [phone]        F: [phone]       "[employer]                   Dr. Bulmaro Felix MD PhD    ON TREATMENT VISIT (OTV) NOTE     Date of Service: 2023  Patient ID: Adonay Chadwick   : 1955  MRN: [id number]   Acct Number: [de-identified]     DIAGNOSIS:  Cancer Staging  Endometrial adenocarcinoma [facility])  Staging form: Corpus Uteri - Carcinoma And Carcinosarcoma, AJCC 8th Edition  - Clinical: Stage IVB (rcTX, cNX, cM1) - Signed by Bulmaro Felix MD on 2022      Treatment Area: Pelvis- Female    Current Total Dose(cGy): 3,400  Current Fraction: 17    Patient was seen today for weekly visit. Wt Readings from Last 3 Encounters:   23 222 lb 10.6 oz (101 kg)   23 221 lb 3.2 oz (100.3 kg)   23 223 lb 9.6 oz (101.4 kg)       BP (!) 142/88   Pulse 67   Temp 97.6 °F (36.4 °C)   Resp 16   Wt 222 lb 10.6 oz (101 kg)   LMP  (LMP Unknown)   SpO2 99%   BMI 34.87 kg/m²     Lab Results   Component Value Date    WBC 6.5 2022     2022       Comfort Alteration  Fatigue:Able to perform daily activities with rest periods    Pain Location: 0  Pain Intensity (Current): 0 No Pain  Pain Treatment: N/A  Pain Relief: n/a    Emotional Alteration:   Coping: effective    Nutritional Alteration  Anorexia: none   Nausea: 1-2 episodes of nausea in 24 hours  Vomiting: No vomiting     Skin Alteration   Skin reaction: No changes noted    Elimination Alterations  Urinary Frequency:  PT self catheterizes. Urinary Retention: Normal  Urinary Incontinence: None  Dysuria: None  Nocturia: None  Proctitis: None  Diarrhea:  PT had one episode of diarrhea today that relieved with immodium.     Additional Comments: none    MEDICATIONS:     Current Outpatient Medications   Medication Sig Dispense Refill    glipiZIDE (GLUCOTROL XL) 2.5 MG extended release tablet Take 2 tablets by mouth once daily 180 tablet 1    loperamide (IMODIUM) 2 MG capsule Take 2 mg by mouth 4 times daily as needed for Diarrhea      tamsulosin (FLOMAX) 0.4 MG capsule Take 1 capsule by mouth daily 30 capsule 11    ONETOUCH ULTRA strip USE 1 STRIP TO CHECK GLUCOSE THREE TIMES DAILY 100 each 5    Lancets (ONETOUCH DELICA PLUS [id number]) MISC USE 1  TO CHECK GLUCOSE THREE TIMES DAILY 100 each 5    atorvastatin (LIPITOR) 10 MG tablet Take 1 tablet by mouth once daily 90 tablet 0    prochlorperazine (COMPAZINE) 10 MG tablet TAKE 1 TABLET BY MOUTH EVERY 6 HOURS AS NEEDED FOR NAUSEA      gabapentin (NEURONTIN) 300 MG capsule Take 1 capsule by mouth 3 times daily for 30 days. 90 capsule 2    acetaminophen (TYLENOL) 500 MG tablet Take 1,000 mg by mouth every 6 hours as needed for Pain      ibuprofen (ADVIL;MOTRIN) 200 MG tablet Take 200 mg by mouth every 6 hours as needed for Pain      amLODIPine (NORVASC) 10 MG tablet Take 1 tablet by mouth once daily (Patient taking differently: 5 mg Take 1 tablet by mouth once daily) 90 tablet 3    nystatin (MYCOSTATIN) 515223 UNIT/GM cream Apply topically 2 times daily. 1 each 1    Handicap Placard MISC by Does not apply route Expires: 2027 1 each 0    tamsulosin (FLOMAX) 0.4 MG capsule Take 1 capsule by mouth daily 90 capsule 3    blood glucose monitor kit and supplies DX: diabetes mellitus. Test 3 time(s) daily - Ok to substitute per insurance 1 kit 1    VITAMIN D PO Take by mouth      CRANBERRY PO Take by mouth      Alcohol Swabs PADS DX: diabetes mellitus. Test 1-3 time(s) daily - Ok to substitute per insurance (1 each = 1 box) 1 each 5    aspirin 81 MG tablet Take 81 mg by mouth daily       No current facility-administered medications for this encounter.      * New    PHYSICAL EXAM:       ECO - Asymptomatic (Fully active, able to carry on all pre-disease activities without restriction)     General: NAD, AO x 3, Mentation is clear with appropriate affect. Chemotherapy Update: None    Treatment Imaging: CBCT    ASSESSMENT: No significant radiation side effects. Responding appropriately to symptomatic management. New medications, diagnostic results: No new images, medications, or changes to current recommendations    PLAN: Again reviewed potential side effects of radiation for the patient's treatment. Continue local/topical care. Continue current radiation course as prescribed.

## 2023-01-20 ENCOUNTER — APPOINTMENT (OUTPATIENT)
Dept: RADIATION ONCOLOGY | Age: 68
End: 2023-01-20
Payer: MEDICARE

## 2023-01-20 PROCEDURE — 77386 HC NTSTY MODUL RAD TX DLVR CPLX: CPT | Performed by: RADIOLOGY

## 2023-01-21 RX ORDER — GABAPENTIN 300 MG/1
300 CAPSULE ORAL 3 TIMES DAILY
Qty: 90 CAPSULE | Refills: 2 | Status: SHIPPED | OUTPATIENT
Start: 2023-01-21 | End: 2023-03-15

## 2023-01-23 ENCOUNTER — APPOINTMENT (OUTPATIENT)
Dept: RADIATION ONCOLOGY | Age: 68
End: 2023-01-23
Payer: MEDICARE

## 2023-01-24 ENCOUNTER — APPOINTMENT (OUTPATIENT)
Dept: RADIATION ONCOLOGY | Age: 68
End: 2023-01-24
Payer: MEDICARE

## 2023-01-24 PROCEDURE — 77336 RADIATION PHYSICS CONSULT: CPT | Performed by: RADIOLOGY

## 2023-01-24 PROCEDURE — 77386 HC NTSTY MODUL RAD TX DLVR CPLX: CPT | Performed by: RADIOLOGY

## 2023-01-25 ENCOUNTER — APPOINTMENT (OUTPATIENT)
Dept: RADIATION ONCOLOGY | Age: 68
End: 2023-01-25
Payer: MEDICARE

## 2023-01-25 DIAGNOSIS — C54.1 ENDOMETRIAL ADENOCARCINOMA (HCC): Primary | ICD-10-CM

## 2023-01-25 PROCEDURE — 77386 HC NTSTY MODUL RAD TX DLVR CPLX: CPT | Performed by: RADIOLOGY

## 2023-01-25 NOTE — PROGRESS NOTES
Minnie Jimenez   [id number]  1955   Patient Mid-Point Distress Screening Form   Please select the number that describes how much distress you have experiened in the past week (0-10): 2    Please select the number that descrbes how much distress you have experienced today (0-10): 2    If you responded with a score of 6 or above to the first tow questions; please address the following concerns:    Practical Concerns 0-10 Comment        Work, Concerns about Job          Finances, Bills, Insurance          Housing          Transportation          Availability of Caregiver     Family Concerns          Dealing with Children          Dealing with Partner          Ability to have [address] Issues     Emotional Concerns          Sadness, Depression          Anxiety, Worry, Fear          Concerns about Appearance          Loss of Interest in Usual Activities     Spiritual Concerns          Connection to a higher power          Sense of meaning and purpose          Support for my spiritual community     Physical Concerns          Nausea          Eating, Loss of Appetite          Back Discomfort 2         Fatigue       Other Concerns/Notes:Pt states minimal discomfort in \"back\" area; otherwise is coping very well with daily Radiation Treatments. She states no current needs, issues or concerns and is \"counting down the days\" un til her treatments are completed.     Date of visit: 1/25/2023  8:32 AM      : MICKEY Nunes

## 2023-01-26 ENCOUNTER — APPOINTMENT (OUTPATIENT)
Dept: RADIATION ONCOLOGY | Age: 68
End: 2023-01-26
Payer: MEDICARE

## 2023-01-26 ENCOUNTER — HOSPITAL ENCOUNTER (OUTPATIENT)
Dept: RADIATION ONCOLOGY | Age: 68
Discharge: HOME | End: 2023-01-26
Payer: MEDICARE

## 2023-01-26 VITALS
OXYGEN SATURATION: 98 % | TEMPERATURE: 97.1 F | WEIGHT: 222.2 LBS | BODY MASS INDEX: 34.8 KG/M2 | RESPIRATION RATE: 18 BRPM | DIASTOLIC BLOOD PRESSURE: 76 MMHG | SYSTOLIC BLOOD PRESSURE: 146 MMHG | HEART RATE: 69 BPM

## 2023-01-26 DIAGNOSIS — C54.1 ENDOMETRIAL ADENOCARCINOMA (HCC): Primary | ICD-10-CM

## 2023-01-26 DIAGNOSIS — C55 MALIGNANT NEOPLASM OF UTERUS, UNSPECIFIED SITE (HCC): ICD-10-CM

## 2023-01-26 PROCEDURE — 77386 HC NTSTY MODUL RAD TX DLVR CPLX: CPT | Performed by: RADIOLOGY

## 2023-01-26 RX ORDER — LORAZEPAM 1 MG/1
1 TABLET ORAL ONCE
Qty: 2 TABLET | Refills: 0 | Status: SHIPPED | OUTPATIENT
Start: 2023-01-26 | End: 2023-01-26

## 2023-01-26 NOTE — PROGRESS NOTES
[facility]           Radiation Oncology      [id number] [place], [place] [address]        O: [phone]        F: [phone]       [facility]                   Dr. Robb Boggs MD PhD    ON TREATMENT VISIT (OTV) NOTE     Date of Service: 2023  Patient ID: Julia Castro   : 1955  MRN: [id number]   Acct Number: [id number]     DIAGNOSIS:  Cancer Staging  Endometrial adenocarcinoma (HCC)  Staging form: Corpus Uteri - Carcinoma And Carcinosarcoma, AJCC 8th Edition  - Clinical: Stage IVB (rcTX, cNX, cM1) - Signed by Robb Boggs MD on 2022      Treatment Area: Pelvis- Female    Current Total Dose(cGy): 4400  Current Fraction: 22    Patient was seen today for weekly visit.     Wt Readings from Last 3 Encounters:   23 222 lb 3.2 oz (100.8 kg)   23 222 lb 10.6 oz (101 kg)   23 221 lb 3.2 oz (100.3 kg)       BP (!) 146/76   Pulse 69   Temp 97.1 °F (36.2 °C)   Resp 18   Wt 222 lb 3.2 oz (100.8 kg)   LMP  (LMP Unknown)   SpO2 98%   BMI 34.80 kg/m²     Lab Results   Component Value Date    WBC 6.5 2022     2022       Comfort Alteration  Fatigue:Able to perform daily activities with rest periods    Pain Location: none  Pain Intensity (Current): 0 No Pain  Pain Treatment: N/A  Pain Relief: n/a    Emotional Alteration:   Coping: effective    Nutritional Alteration  Anorexia: none   Nausea: No nausea noted  Vomiting: No vomiting     Skin Alteration   Skin reaction: No changes noted    Elimination Alterations  Urinary Frequency: None  Urinary Retention:  self caths   Urinary Incontinence: None  Dysuria: None  Nocturia:  2-4x  Proctitis: None  Diarrhea:  having diarrhea 1-2 times a week, takes imodium    Additional Comments: pt will have MRI on Monday    MEDICATIONS:     Current Outpatient Medications   Medication Sig Dispense Refill    gabapentin (NEURONTIN) 300 MG capsule Take 1 capsule by mouth 3 times daily for 30 days.  90 capsule 2    glipiZIDE (GLUCOTROL XL) 2.5 MG extended release tablet Take 2 tablets by mouth once daily 180 tablet 1    loperamide (IMODIUM) 2 MG capsule Take 2 mg by mouth 4 times daily as needed for Diarrhea      tamsulosin (FLOMAX) 0.4 MG capsule Take 1 capsule by mouth daily 30 capsule 11    ONETOUCH ULTRA strip USE 1 STRIP TO CHECK GLUCOSE THREE TIMES DAILY 100 each 5    Lancets (ONETOUCH DELICA PLUS [id number]) MISC USE 1  TO CHECK GLUCOSE THREE TIMES DAILY 100 each 5    atorvastatin (LIPITOR) 10 MG tablet Take 1 tablet by mouth once daily 90 tablet 0    prochlorperazine (COMPAZINE) 10 MG tablet TAKE 1 TABLET BY MOUTH EVERY 6 HOURS AS NEEDED FOR NAUSEA (Patient not taking: Reported on 2023)      acetaminophen (TYLENOL) 500 MG tablet Take 1,000 mg by mouth every 6 hours as needed for Pain      ibuprofen (ADVIL;MOTRIN) 200 MG tablet Take 200 mg by mouth every 6 hours as needed for Pain      amLODIPine (NORVASC) 10 MG tablet Take 1 tablet by mouth once daily (Patient taking differently: 5 mg Take 1 tablet by mouth once daily) 90 tablet 3    nystatin (MYCOSTATIN) 038197 UNIT/GM cream Apply topically 2 times daily. (Patient not taking: Reported on 2023) 1 each 1    Handicap Placard MISC by Does not apply route Expires: 2027 1 each 0    tamsulosin (FLOMAX) 0.4 MG capsule Take 1 capsule by mouth daily 90 capsule 3    blood glucose monitor kit and supplies DX: diabetes mellitus. Test 3 time(s) daily - Ok to substitute per insurance 1 kit 1    VITAMIN D PO Take by mouth      CRANBERRY PO Take by mouth      Alcohol Swabs PADS DX: diabetes mellitus. Test 1-3 time(s) daily - Ok to substitute per insurance (1 each = 1 box) 1 each 5    aspirin 81 MG tablet Take 81 mg by mouth daily       No current facility-administered medications for this encounter.      * New    PHYSICAL EXAM:       ECO - Symptomatic but completely ambulatory (Restricted in physically strenuous activity but ambulatory and able to carry out work of a light or sedentary nature. For example, light housework, office work)     General: NAD, AO x 3, Mentation is clear with appropriate affect.  HEENT: Normocephalic, atraumatic  Thorax:  Unlabored  Abdomen:  Non-distended    Chemotherapy Update: None    Treatment Imaging: CBCT    ASSESSMENT: Modest radiation side effects. Responding appropriately to symptomatic management.    New medications, diagnostic results: Continue treatment as planned    PLAN: Again reviewed potential side effects of radiation for the patient's treatment.    Continue local/topical care.  The patient has been tolerating therapy quite well and will continue initial phase until she completes this on 1/31/2023.  She has been restimulated for the boost fields.  I have arranged to attain an MRI of the pelvis with and without contrast for restaging purposes on 1/30/2023 and will use this scan to help redesign her radiation portals for the boost phase.  I have prescribed Ativan premedication for this MRI.   Continue current radiation course as prescribed.

## 2023-01-26 NOTE — PROGRESS NOTES
Nutrition Education    Educated on diet for diarrhea  Learners: Patient and   Readiness: Acceptance  Method: Explanation  Response: Verbalizes Understanding  Contact name and number provided. Pt with diarrhea 2-3 days per week, likely related to xrt. Pt instructed on diet adjustments and hydration related to diarrhea. Pt asked to contact staff if diarrhea worsens. Provided pt with Eating Hints booklet to support instruction.     Kandis Pinedo, YOSEPH, LD  Contact Number: [phone]

## 2023-01-27 ENCOUNTER — APPOINTMENT (OUTPATIENT)
Dept: RADIATION ONCOLOGY | Age: 68
End: 2023-01-27
Payer: MEDICARE

## 2023-01-27 PROCEDURE — 77386 HC NTSTY MODUL RAD TX DLVR CPLX: CPT | Performed by: RADIOLOGY

## 2023-01-30 ENCOUNTER — HOSPITAL ENCOUNTER (OUTPATIENT)
Dept: MRI IMAGING | Age: 68
Discharge: HOME | End: 2023-02-01
Payer: MEDICARE

## 2023-01-30 ENCOUNTER — APPOINTMENT (OUTPATIENT)
Dept: RADIATION ONCOLOGY | Age: 68
End: 2023-01-30
Payer: MEDICARE

## 2023-01-30 DIAGNOSIS — C54.1 ENDOMETRIAL ADENOCARCINOMA (HCC): ICD-10-CM

## 2023-01-30 PROCEDURE — 6360000004 HC RX CONTRAST MEDICATION: Performed by: RADIOLOGY

## 2023-01-30 PROCEDURE — A9577 INJ MULTIHANCE: HCPCS | Performed by: RADIOLOGY

## 2023-01-30 PROCEDURE — 72197 MRI PELVIS W/O & W/DYE: CPT

## 2023-01-30 RX ORDER — SODIUM CHLORIDE 0.9 % (FLUSH) 0.9 %
10 SYRINGE (ML) INJECTION PRN
Status: DISCONTINUED | OUTPATIENT
Start: 2023-01-30 | End: 2023-02-02 | Stop reason: HOSPADM

## 2023-01-30 RX ADMIN — GADOBENATE DIMEGLUMINE 20 ML: 529 INJECTION, SOLUTION INTRAVENOUS at 19:20

## 2023-01-31 ENCOUNTER — APPOINTMENT (OUTPATIENT)
Dept: RADIATION ONCOLOGY | Age: 68
End: 2023-01-31
Payer: MEDICARE

## 2023-01-31 DIAGNOSIS — E11.22 TYPE 2 DIABETES MELLITUS WITH STAGE 3A CHRONIC KIDNEY DISEASE, WITHOUT LONG-TERM CURRENT USE OF INSULIN (HCC): ICD-10-CM

## 2023-01-31 DIAGNOSIS — N18.31 TYPE 2 DIABETES MELLITUS WITH STAGE 3A CHRONIC KIDNEY DISEASE, WITHOUT LONG-TERM CURRENT USE OF INSULIN (HCC): ICD-10-CM

## 2023-01-31 PROCEDURE — 77336 RADIATION PHYSICS CONSULT: CPT | Performed by: RADIOLOGY

## 2023-01-31 PROCEDURE — 77386 HC NTSTY MODUL RAD TX DLVR CPLX: CPT | Performed by: RADIOLOGY

## 2023-01-31 PROCEDURE — 77014 CHG CT GUIDANCE RADIATION THERAPY FLDS PLACEMENT: CPT | Performed by: RADIOLOGY

## 2023-02-01 ENCOUNTER — APPOINTMENT (OUTPATIENT)
Dept: RADIATION ONCOLOGY | Age: 68
End: 2023-02-01
Payer: MEDICARE

## 2023-02-01 PROCEDURE — 77300 RADIATION THERAPY DOSE PLAN: CPT | Performed by: RADIOLOGY

## 2023-02-01 PROCEDURE — 77301 RADIOTHERAPY DOSE PLAN IMRT: CPT | Performed by: RADIOLOGY

## 2023-02-01 PROCEDURE — 77338 DESIGN MLC DEVICE FOR IMRT: CPT | Performed by: RADIOLOGY

## 2023-02-01 NOTE — TELEPHONE ENCOUNTER
Comments:     Last Office Visit (last PCP visit):   10/11/2022    Next Visit Date:  Future Appointments   Date Time Provider Dawna Dian   2/2/2023  8:30 AM SCHEDULE, [facility] RAD ONC [facility] 2 [facility] [place] [facility]   2/3/2023  8:30 AM SCHEDULE, MLOZ RAD ONC [facility] 2 [facility] RAD ONC [place] [facility]   2/6/2023  8:30 AM SCHEDULE, MLOZ RAD ONC [facility] 2 [facility] RAD ONC [place] [facility]   2/7/2023  8:30 AM SCHEDULE, MLOZ RAD ONC LIN[facility] 2 [facility] RAD ONC [place] [facility]   2/8/2023  8:30 AM SCHEDULE, MLOZ RAD ONC LIN[facility] 2 [facility] RAD ONC [place] [facility]   2/9/2023  8:30 AM SCHEDULE, MLOZ RAD ONC LIN[facility] 2 [facility] ON[facility] [place] [facility]   2/10/2023  8:30 AM SCHEDULE, MLOZ RAD ONC LIN[facility] 2 [facility] RAD ONC [place] [facility]   2/10/2023  1:15 PM Lisa Sullivan MD [facility]   2/13/2023  8:30 AM SCHEDULE, MLOZ RAD ONC LINAC 2 [facility] RAD ONC [place] [facility]   2/14/2023  8:30 AM SCHEDULE, MLOZ RAD ONC LINAC 2 [facility] RAD ONC [place] [facility]   2/15/2023  8:30 AM SCHEDULE, MLOZ RAD ONC LIN[facility] 2 [facility] RAD ONC [place] [facility]   5/12/2023 12:30 PM Lisa Sullivan MD [facility]   12/29/2023 10:30 AM TAMIA Chatman [facility]       **If hasn't been seen in over a year OR hasn't followed up according to last diabetes/ADHD visit, make appointment for patient before sending refill to provider.     Rx requested:  Requested Prescriptions     Pending Prescriptions Disp Refills    atorvastatin (LIPITOR) 10 MG tablet [Pharmacy Med Name: Atorvastatin Calcium 10 MG Oral Tablet] 90 tablet 0     Sig: Take 1 tablet by mouth once daily

## 2023-02-02 ENCOUNTER — HOSPITAL ENCOUNTER (OUTPATIENT)
Dept: RADIATION ONCOLOGY | Age: 68
Discharge: HOME | End: 2023-02-02
Payer: MEDICARE

## 2023-02-02 VITALS
SYSTOLIC BLOOD PRESSURE: 167 MMHG | TEMPERATURE: 96.8 F | DIASTOLIC BLOOD PRESSURE: 79 MMHG | BODY MASS INDEX: 34.24 KG/M2 | HEART RATE: 87 BPM | RESPIRATION RATE: 18 BRPM | OXYGEN SATURATION: 99 % | WEIGHT: 218.6 LBS

## 2023-02-02 PROCEDURE — 77427 RADIATION TX MANAGEMENT X5: CPT | Performed by: RADIOLOGY

## 2023-02-02 PROCEDURE — 77386 HC NTSTY MODUL RAD TX DLVR CPLX: CPT | Performed by: RADIOLOGY

## 2023-02-02 NOTE — PROGRESS NOTES
[facility]           Radiation Oncology      [address] [facility]        [address]        Nasreen Ryan: [phone]        F: [phone]       [email or web address]                   Dr. Janice Green MD PhD    ON TREATMENT VISIT (OTV) NOTE     Date of Service: 2023  Patient ID: Mitul Cano   :   MRN: [id number]   Acct Number: [de-identified]     DIAGNOSIS:  Cancer Staging  Endometrial adenocarcinoma [facility])  Staging form: Corpus Uteri - Carcinoma And Carcinosarcoma, AJCC 8th Edition  - Clinical: Stage IVB (rcTX, cNX, cM1) - Signed by Janice Green MD on 2022      Treatment Area: Pelvis- Female    Current Total Dose(cGy): 5200 cGy  Current Fraction: 26    Patient was seen today for weekly visit. Wt Readings from Last 3 Encounters:   23 218 lb 9.6 oz (99.2 kg)   23 222 lb 3.2 oz (100.8 kg)   23 222 lb 10.6 oz (101 kg)       BP (!) 167/79   Pulse 87   Temp 96.8 °F (36 °C)   Resp 18   Wt 218 lb 9.6 oz (99.2 kg)   LMP  (LMP Unknown)   SpO2 99%   BMI 34.24 kg/m²     Lab Results   Component Value Date    WBC 6.5 2022     2022       Comfort Alteration  Fatigue:Able to perform daily activities with rest periods    Pain Location: Lower back and knees chronic  Pain Intensity (Current): 2 Mild pain  Pain Treatment: Tylenol Arthritis as needed  Pain Relief: 75%    Emotional Alteration:   Coping: effective    Nutritional Alteration  Anorexia: none   Nausea: No nausea noted  Vomiting: No vomiting     Skin Alteration   Skin reaction: No changes noted-using Eucerin 1-2 times daily    Elimination Alterations  Urinary Frequency:  Self cath 2 times during the day and 3 times at night. Will urinate a couple times daily. Denies blood in urine  Urinary Retention: Self cath  Urinary Incontinence: None  Dysuria: None  Nocturia:  Self cath 3 times  Proctitis: None  Diarrhea:  1 mild episode of diarrhea on Tuesday of this past week.  Took imodium with relief. Will have daily soft BM's normally. Denies blood in stool    Additional Comments:     MEDICATIONS:     Current Outpatient Medications   Medication Sig Dispense Refill    gabapentin (NEURONTIN) 300 MG capsule Take 1 capsule by mouth 3 times daily for 30 days. 90 capsule 2    glipiZIDE (GLUCOTROL XL) 2.5 MG extended release tablet Take 2 tablets by mouth once daily 180 tablet 1    loperamide (IMODIUM) 2 MG capsule Take 2 mg by mouth 4 times daily as needed for Diarrhea      tamsulosin (FLOMAX) 0.4 MG capsule Take 1 capsule by mouth daily 30 capsule 11    ONETOUCH ULTRA strip USE 1 STRIP TO CHECK GLUCOSE THREE TIMES DAILY 100 each 5    Lancets (ONETOUCH DELICA PLUS [id number]) MISC USE 1  TO CHECK GLUCOSE THREE TIMES DAILY 100 each 5    atorvastatin (LIPITOR) 10 MG tablet Take 1 tablet by mouth once daily 90 tablet 0    prochlorperazine (COMPAZINE) 10 MG tablet TAKE 1 TABLET BY MOUTH EVERY 6 HOURS AS NEEDED FOR NAUSEA (Patient not taking: No sig reported)      acetaminophen (TYLENOL) 500 MG tablet Take 1,000 mg by mouth every 6 hours as needed for Pain      ibuprofen (ADVIL;MOTRIN) 200 MG tablet Take 200 mg by mouth every 6 hours as needed for Pain      amLODIPine (NORVASC) 10 MG tablet Take 1 tablet by mouth once daily (Patient taking differently: 5 mg Take 1 tablet by mouth once daily) 90 tablet 3    nystatin (MYCOSTATIN) 461592 UNIT/GM cream Apply topically 2 times daily. (Patient not taking: No sig reported) 1 each 1    Handicap Placard MISC by Does not apply route Expires: 7/9/2027 1 each 0    tamsulosin (FLOMAX) 0.4 MG capsule Take 1 capsule by mouth daily 90 capsule 3    blood glucose monitor kit and supplies DX: diabetes mellitus. Test 3 time(s) daily - Ok to substitute per insurance 1 kit 1    VITAMIN D PO Take by mouth      CRANBERRY PO Take by mouth      Alcohol Swabs PADS DX: diabetes mellitus.  Test 1-3 time(s) daily - Ok to substitute per insurance (1 each = 1 box) 1 each 5    aspirin 81 MG tablet Take 81 mg by mouth daily       No current facility-administered medications for this encounter.     * New    PHYSICAL EXAM:       ECO - Symptomatic but completely ambulatory (Restricted in physically strenuous activity but ambulatory and able to carry out work of a light or sedentary nature. For example, light housework, office work)     General: NAD, AO x 3, Mentation is clear with appropriate affect.  HEENT: Normocephalic, atraumatic  Thorax:  Unlabored  Abdomen:  Non-distended    Chemotherapy Update: None    Treatment Imaging: CBCT    ASSESSMENT: Modest radiation side effects. Responding appropriately to symptomatic management.    New medications, diagnostic results: Continue treatment as planned    PLAN: Again reviewed potential side effects of radiation for the patient's treatment.    Continue local/topical care.  Patient did note an episode of diarrhea earlier this week which was uncomplicated and resolved with Imodium.  She has not had any episodes since then.  She otherwise denies any complaints.  We have started her sequential boost phase we will coordinate with GYN oncology regarding imaging and evaluation for potential salvage surgery if she has persistent disease.   Continue current radiation course as prescribed.

## 2023-02-03 ENCOUNTER — HOSPITAL ENCOUNTER (OUTPATIENT)
Dept: RADIATION ONCOLOGY | Age: 68
Discharge: HOME | End: 2023-02-03
Payer: MEDICARE

## 2023-02-03 PROCEDURE — 77386 HC NTSTY MODUL RAD TX DLVR CPLX: CPT | Performed by: RADIOLOGY

## 2023-02-03 RX ORDER — ATORVASTATIN CALCIUM 10 MG/1
TABLET, FILM COATED ORAL
Qty: 90 TABLET | Refills: 0 | Status: SHIPPED | OUTPATIENT
Start: 2023-02-03

## 2023-02-06 ENCOUNTER — HOSPITAL ENCOUNTER (OUTPATIENT)
Dept: RADIATION ONCOLOGY | Age: 68
Discharge: HOME | End: 2023-02-06
Payer: MEDICARE

## 2023-02-07 ENCOUNTER — HOSPITAL ENCOUNTER (OUTPATIENT)
Dept: RADIATION ONCOLOGY | Age: 68
Discharge: HOME | End: 2023-02-07
Payer: MEDICARE

## 2023-02-07 PROCEDURE — 77014 CHG CT GUIDANCE RADIATION THERAPY FLDS PLACEMENT: CPT | Performed by: RADIOLOGY

## 2023-02-07 PROCEDURE — 77386 HC NTSTY MODUL RAD TX DLVR CPLX: CPT | Performed by: RADIOLOGY

## 2023-02-08 ENCOUNTER — HOSPITAL ENCOUNTER (OUTPATIENT)
Dept: RADIATION ONCOLOGY | Age: 68
Discharge: HOME | End: 2023-02-08
Payer: MEDICARE

## 2023-02-08 PROCEDURE — 77386 HC NTSTY MODUL RAD TX DLVR CPLX: CPT | Performed by: RADIOLOGY

## 2023-02-08 PROCEDURE — 77336 RADIATION PHYSICS CONSULT: CPT | Performed by: RADIOLOGY

## 2023-02-09 ENCOUNTER — HOSPITAL ENCOUNTER (OUTPATIENT)
Dept: RADIATION ONCOLOGY | Age: 68
Discharge: HOME | End: 2023-02-09
Payer: MEDICARE

## 2023-02-09 VITALS
WEIGHT: 219 LBS | HEART RATE: 67 BPM | RESPIRATION RATE: 18 BRPM | TEMPERATURE: 97.7 F | OXYGEN SATURATION: 99 % | DIASTOLIC BLOOD PRESSURE: 86 MMHG | SYSTOLIC BLOOD PRESSURE: 154 MMHG | BODY MASS INDEX: 34.3 KG/M2

## 2023-02-09 PROCEDURE — 77386 HC NTSTY MODUL RAD TX DLVR CPLX: CPT | Performed by: RADIOLOGY

## 2023-02-09 NOTE — PROGRESS NOTES
Nutrition Note    Wt check this week. 219#  Wt stable. Diarrhea x 1 since last week. No new concerns. Pt finishes xrt next week.      Electronically signed by Amanda Bill RD, JNOAH on 2/9/23 at 8:56 AM EST    Contact: [phone]
none

## 2023-02-09 NOTE — PROGRESS NOTES
[facility]           Radiation Oncology      [address] [facility]        Enrique Alarcon ush: [phone]        F: [phone]       [employer]                   Dr. Logan Xavier MD PhD    ON TREATMENT VISIT (OTV) NOTE     Date of Service: 2023  Patient ID: Zhou Anderson   :   MRN: [id number]   Acct Number: [de-identified]     DIAGNOSIS:  Cancer Staging  Endometrial adenocarcinoma [facility])  Staging form: Corpus Uteri - Carcinoma And Carcinosarcoma, AJCC 8th Edition  - Clinical: Stage IVB (rcTX, cNX, cM1) - Signed by Logan Xavier MD on 2022      Treatment Area: Pelvis- Female    Current Total Dose(cGy): 6200  Current Fraction: 31    Patient was seen today for weekly visit.      Wt Readings from Last 3 Encounters:   23 219 lb (99.3 kg)   23 218 lb 9.6 oz (99.2 kg)   23 222 lb 3.2 oz (100.8 kg)       BP (!) 154/86   Pulse 67   Temp 97.7 °F (36.5 °C)   Resp 18   Wt 219 lb (99.3 kg)   LMP  (LMP Unknown)   SpO2 99%   BMI 34.30 kg/m²     Lab Results   Component Value Date    WBC 6.5 2022     2022       Comfort Alteration  Fatigue:Able to perform daily activities with rest periods    Pain Location: none  Pain Intensity (Current): 0 No Pain  Pain Treatment: N/A  Pain Relief: n/a    Emotional Alteration:   Coping: effective    Nutritional Alteration  Anorexia: none   Nausea: No nausea noted  Vomiting: No vomiting     Skin Alteration   Skin reaction: No changes noted    Elimination Alterations  Urinary Frequency: Urinating more than once an hour  Urinary Retention:  self caths for residual  Urinary Incontinence: None  Dysuria: None  Nocturia: baseline 3-5x   Proctitis: None  Diarrhea:  occasional diarrhea, takes imodium prn    Additional Comments: pt would like follow up with Dr Lesly Mccollum at her [employer] office    MEDICATIONS:     Current Outpatient Medications   Medication Sig Dispense Refill    atorvastatin (LIPITOR) 10 MG tablet Take 1 tablet by mouth once daily 90 tablet 0    gabapentin (NEURONTIN) 300 MG capsule Take 1 capsule by mouth 3 times daily for 30 days. 90 capsule 2    glipiZIDE (GLUCOTROL XL) 2.5 MG extended release tablet Take 2 tablets by mouth once daily 180 tablet 1    loperamide (IMODIUM) 2 MG capsule Take 2 mg by mouth 4 times daily as needed for Diarrhea      tamsulosin (FLOMAX) 0.4 MG capsule Take 1 capsule by mouth daily 30 capsule 11    ONETOUCH ULTRA strip USE 1 STRIP TO CHECK GLUCOSE THREE TIMES DAILY 100 each 5    Lancets (ONETOUCH DELICA PLUS [id number]) MISC USE 1  TO CHECK GLUCOSE THREE TIMES DAILY 100 each 5    prochlorperazine (COMPAZINE) 10 MG tablet TAKE 1 TABLET BY MOUTH EVERY 6 HOURS AS NEEDED FOR NAUSEA (Patient not taking: No sig reported)      acetaminophen (TYLENOL) 500 MG tablet Take 1,000 mg by mouth every 6 hours as needed for Pain      ibuprofen (ADVIL;MOTRIN) 200 MG tablet Take 200 mg by mouth every 6 hours as needed for Pain      amLODIPine (NORVASC) 10 MG tablet Take 1 tablet by mouth once daily (Patient taking differently: 5 mg Take 1 tablet by mouth once daily) 90 tablet 3    nystatin (MYCOSTATIN) 917232 UNIT/GM cream Apply topically 2 times daily. (Patient not taking: No sig reported) 1 each 1    Handicap Placard MISC by Does not apply route Expires: 2027 1 each 0    tamsulosin (FLOMAX) 0.4 MG capsule Take 1 capsule by mouth daily 90 capsule 3    blood glucose monitor kit and supplies DX: diabetes mellitus. Test 3 time(s) daily - Ok to substitute per insurance 1 kit 1    VITAMIN D PO Take by mouth      CRANBERRY PO Take by mouth      Alcohol Swabs PADS DX: diabetes mellitus. Test 1-3 time(s) daily - Ok to substitute per insurance (1 each = 1 box) 1 each 5    aspirin 81 MG tablet Take 81 mg by mouth daily       No current facility-administered medications for this encounter.      * New    PHYSICAL EXAM:       ECO - Symptomatic but completely ambulatory (Restricted in physically strenuous activity but ambulatory and able to carry out work of a light or sedentary nature. For example, light housework, office work)     General: NAD, AO x 3, Mentation is clear with appropriate affect. HEENT: Normocephalic, atraumatic  Thorax:  Unlabored  Abdomen:  Non-distended    Chemotherapy Update: None    Treatment Imaging: CBCT    ASSESSMENT: Modest radiation side effects. Responding appropriately to symptomatic management. New medications, diagnostic results: Continue treatment as planned    PLAN: Again reviewed potential side effects of radiation for the patient's treatment. Continue local/topical care. She has had 2 episodes of nonbloody diarrhea earlier this week. We reinforced the importance of Imodium patient will be completing radiation therapy next week. We will coordinate with Dr. Brittaney Easley with GYN oncology at [facility] to have the patient seen by her again in follow-up in 2 to 3 weeks to evaluate response in preparation of possible salvage surgery. Continue current radiation course as prescribed.

## 2023-02-10 ENCOUNTER — HOSPITAL ENCOUNTER (OUTPATIENT)
Dept: RADIATION ONCOLOGY | Age: 68
Discharge: HOME | End: 2023-02-10
Payer: MEDICARE

## 2023-02-10 PROCEDURE — 77386 HC NTSTY MODUL RAD TX DLVR CPLX: CPT | Performed by: RADIOLOGY

## 2023-02-13 ENCOUNTER — APPOINTMENT (OUTPATIENT)
Dept: RADIATION ONCOLOGY | Age: 68
End: 2023-02-13
Payer: MEDICARE

## 2023-02-13 PROCEDURE — 77014 CHG CT GUIDANCE RADIATION THERAPY FLDS PLACEMENT: CPT | Performed by: RADIOLOGY

## 2023-02-13 PROCEDURE — 77386 HC NTSTY MODUL RAD TX DLVR CPLX: CPT | Performed by: RADIOLOGY

## 2023-02-14 ENCOUNTER — APPOINTMENT (OUTPATIENT)
Dept: RADIATION ONCOLOGY | Age: 68
End: 2023-02-14
Payer: MEDICARE

## 2023-02-14 PROCEDURE — 77014 CHG CT GUIDANCE RADIATION THERAPY FLDS PLACEMENT: CPT | Performed by: RADIOLOGY

## 2023-02-14 PROCEDURE — 77386 HC NTSTY MODUL RAD TX DLVR CPLX: CPT | Performed by: RADIOLOGY

## 2023-02-14 NOTE — PROGRESS NOTES
17 [facility]           Radiation Oncology      [address] [facility]        [address]        Hiren Fry: [phone]        F: [phone]       [facility]                   Dr. Tamy Lovelace MD PhD    RADIATION TREATMENT SUMMARY NOTE     Date of Service: 2023  Patient ID: Sun Siddiqui   :   MRN: [id number]   Acct Number: [de-identified]       Patient Name:  Sun Siddiqui,  ,  79 y.o., female       Referring Physician: Sherry Pena MD  [id number] [address]      PCP: Osiris Lopez MD       Diagnosis: Endometrial adenocarcinoma [facility])  Staging form: Corpus Uteri - Carcinoma And Carcinosarcoma, AJCC 8th Edition  - Clinical: Stage IVB (rcTX, cNX, cM1) - Signed by Tamy Lovelace MD on 2022        Recent History:  ***    SITE START [place] LAST  [place] 153 ED FRACTIONS DOSE FX DOSE TECHNIQUE   Pelvic 22   35         25  5000 cGy   200 cGy  VMAT   Pelvic CD 2/2/23 2/15/23   13         10  2000 cGy   200 cGy  VMAT                                                                       Response/Tolerance: ***    Follow-up:  ***            Tamy Lovelace MD PhD  Radiation Oncologist, [facility]

## 2023-02-15 ENCOUNTER — APPOINTMENT (OUTPATIENT)
Dept: RADIATION ONCOLOGY | Age: 68
End: 2023-02-15
Payer: MEDICARE

## 2023-02-15 VITALS
RESPIRATION RATE: 18 BRPM | SYSTOLIC BLOOD PRESSURE: 148 MMHG | DIASTOLIC BLOOD PRESSURE: 78 MMHG | OXYGEN SATURATION: 98 % | WEIGHT: 218.8 LBS | HEART RATE: 67 BPM | TEMPERATURE: 96.7 F | BODY MASS INDEX: 34.27 KG/M2

## 2023-02-15 PROCEDURE — 77014 CHG CT GUIDANCE RADIATION THERAPY FLDS PLACEMENT: CPT | Performed by: RADIOLOGY

## 2023-02-15 PROCEDURE — 77386 HC NTSTY MODUL RAD TX DLVR CPLX: CPT | Performed by: RADIOLOGY

## 2023-02-15 PROCEDURE — 77336 RADIATION PHYSICS CONSULT: CPT | Performed by: RADIOLOGY

## 2023-02-15 NOTE — PROGRESS NOTES
17 [facility]           Radiation Oncology      [address] [facility]        [address]        Sebastian Foster: [phone]        F: [phone]       [employer]                   Dr. Mariela Hernandez MD PhD    ON TREATMENT VISIT (OTV) NOTE     Date of Service: 2/15/2023  Patient ID: Araceli Castaneda   :   MRN: [id number]   Acct Number: [de-identified]     DIAGNOSIS:  Cancer Staging  Endometrial adenocarcinoma [facility])  Staging form: Corpus Uteri - Carcinoma And Carcinosarcoma, AJCC 8th Edition  - Clinical: Stage IVB (rcTX, cNX, cM1) - Signed by Mariela Hernandez MD on 2022      Treatment Area: Pelvis- Female    Current Total Dose(cGy): 7000  Current Fraction: 35    Patient was seen today for weekly visit.      Wt Readings from Last 3 Encounters:   02/15/23 218 lb 12.8 oz (99.2 kg)   23 219 lb (99.3 kg)   23 218 lb 9.6 oz (99.2 kg)       BP (!) 148/78   Pulse 67   Temp (!) 96.7 °F (35.9 °C) (Temporal)   Resp 18   Wt 218 lb 12.8 oz (99.2 kg)   LMP  (LMP Unknown)   SpO2 98%   BMI 34.27 kg/m²     Lab Results   Component Value Date    WBC 6.5 2022     2022       Comfort Alteration  Fatigue:None, able to perform daily activities    Pain Location: arthritis pain to bilateral knees    Emotional Alteration:   Coping: effective    Nutritional Alteration  Anorexia: none   Nausea: No nausea noted  Vomiting: No vomiting     Skin Alteration   Skin reaction: No changes noted    Elimination Alterations  Urinary Frequency: None  Urinary Retention: Normal  Urinary Incontinence: None  Nocturia 3-5 times nightly, no change from baseline per patient  Proctitis: None  Diarrhea: 1-2 times weekly, takes imodium     Additional Comments:     MEDICATIONS:     Current Outpatient Medications   Medication Sig Dispense Refill    atorvastatin (LIPITOR) 10 MG tablet Take 1 tablet by mouth once daily 90 tablet 0    gabapentin (NEURONTIN) 300 MG capsule Take 1 capsule by mouth 3 times daily for 30 days. 90 capsule 2    glipiZIDE (GLUCOTROL XL) 2.5 MG extended release tablet Take 2 tablets by mouth once daily 180 tablet 1    loperamide (IMODIUM) 2 MG capsule Take 2 mg by mouth 4 times daily as needed for Diarrhea      tamsulosin (FLOMAX) 0.4 MG capsule Take 1 capsule by mouth daily 30 capsule 11    ONETOUCH ULTRA strip USE 1 STRIP TO CHECK GLUCOSE THREE TIMES DAILY 100 each 5    Lancets (ONETOUCH DELICA PLUS [id number]) MISC USE 1  TO CHECK GLUCOSE THREE TIMES DAILY 100 each 5    prochlorperazine (COMPAZINE) 10 MG tablet TAKE 1 TABLET BY MOUTH EVERY 6 HOURS AS NEEDED FOR NAUSEA (Patient not taking: No sig reported)      acetaminophen (TYLENOL) 500 MG tablet Take 1,000 mg by mouth every 6 hours as needed for Pain      ibuprofen (ADVIL;MOTRIN) 200 MG tablet Take 200 mg by mouth every 6 hours as needed for Pain      amLODIPine (NORVASC) 10 MG tablet Take 1 tablet by mouth once daily (Patient taking differently: 5 mg Take 1 tablet by mouth once daily) 90 tablet 3    nystatin (MYCOSTATIN) 520143 UNIT/GM cream Apply topically 2 times daily. (Patient not taking: No sig reported) 1 each 1    Handicap Placard MISC by Does not apply route Expires: 2027 1 each 0    tamsulosin (FLOMAX) 0.4 MG capsule Take 1 capsule by mouth daily 90 capsule 3    blood glucose monitor kit and supplies DX: diabetes mellitus. Test 3 time(s) daily - Ok to substitute per insurance 1 kit 1    VITAMIN D PO Take by mouth      CRANBERRY PO Take by mouth      Alcohol Swabs PADS DX: diabetes mellitus. Test 1-3 time(s) daily - Ok to substitute per insurance (1 each = 1 box) 1 each 5    aspirin 81 MG tablet Take 81 mg by mouth daily       No current facility-administered medications for this encounter. * New    PHYSICAL EXAM:       ECO - Symptomatic but completely ambulatory (Restricted in physically strenuous activity but ambulatory and able to carry out work of a light or sedentary nature.  For example, light housework, office work)     General: NAD, AO x 3, Mentation is clear with appropriate affect. HEENT: Normocephalic, atraumatic  Thorax:  Unlabored  Abdomen:  Non-distended    Chemotherapy Update: None    Treatment Imaging: CBCT    ASSESSMENT: Modest radiation side effects. Responding appropriately to symptomatic management. New medications, diagnostic results: Continue treatment as planned    PLAN: Again reviewed potential side effects of radiation for the patient's treatment. Continue local/topical care. She completes radiation therapy today. I will see her back for a quick check in 1 month. She will be seeing Dr. Eduin Hartman in follow-up on 3/6/23 and I will coordinate with her regarding next steps and imaging studies in anticipation of potential salvage surgery. Continue current radiation course as prescribed.

## 2023-02-15 NOTE — PROGRESS NOTES
Discharge instructions provided and reviewed including follow up appointment scheduled for 3/15/2023 at 930. Patient verbalized understanding.

## 2023-03-15 ENCOUNTER — HOSPITAL ENCOUNTER (OUTPATIENT)
Dept: RADIATION ONCOLOGY | Age: 68
Discharge: HOME | End: 2023-03-15
Payer: MEDICARE

## 2023-03-15 VITALS
RESPIRATION RATE: 16 BRPM | HEART RATE: 65 BPM | SYSTOLIC BLOOD PRESSURE: 142 MMHG | WEIGHT: 217.6 LBS | OXYGEN SATURATION: 98 % | TEMPERATURE: 96.7 F | BODY MASS INDEX: 34.08 KG/M2 | DIASTOLIC BLOOD PRESSURE: 76 MMHG

## 2023-03-15 DIAGNOSIS — C54.1 ENDOMETRIAL ADENOCARCINOMA (HCC): Primary | ICD-10-CM

## 2023-03-15 PROCEDURE — 99212 OFFICE O/P EST SF 10 MIN: CPT | Performed by: RADIOLOGY

## 2023-03-15 RX ORDER — LORAZEPAM 1 MG/1
1 TABLET ORAL ONCE
Qty: 2 TABLET | Refills: 0 | Status: SHIPPED | OUTPATIENT
Start: 2023-03-15 | End: 2023-03-15

## 2023-03-15 NOTE — PROGRESS NOTES
NURSING ASSESSMENT     Date: 3/15/2023        Patient Name: Bryanna Saini     YOB: 1955      Age:  79 y.o. MRN: [id number]       Chaperone [x] Yes   [] No  Nolan (spouse)    Advance Directives:   Do you currently have completed advance directives (living will)? [] Yes   [x] No         *If yes, please bring us a copy for your records. *If no, would you like info or assistance in completing advance directives (living will)? [] Yes   [x] No    Pain Score: baseline bilateral knee pain    General: No Problems  Patient has gained weight [] Yes   [x] No  Patient has lost weight [] Yes   [x] No  How much weight in pounds and over what length of time:     Eyes (Ophthalmic): No Problem     Skin (Dermatological):  No Problems     ENT: No Problems     Respiratory: No Problems     Cardiovascular: No changes      Device   [] Yes   [x] No   Copy of Card Obtained [] Yes   [] No    Gastrointestinal: No Problems    Genito-Urinary: history of self catheterization     Breast: No Problems     Musculoskeletal: No changes    Neurological: No Problems      Hematological and Lymphatic: No Problems     Endocrine: Diabetes Mellitus

## 2023-03-19 NOTE — PROGRESS NOTES
17 [facility]           Radiation Oncology      [address] [facility]        [address]        [facility]: [phone]        F: [phone]       [employer]                   Dr. Nayely Belcher MD PhD    FOLLOW-UP NOTE     Date of Service: 3/15/2023  Patient ID: Ericka Cabrera   : [id number]  MRN: [id number]   Acct Number: [de-identified]       NAME:  Ericka Cabrera    :  1955 79 y.o. female     PCP: Mauro Butts MD    REFERRING PROVIDER: Marlen Garcia    DIAGNOSIS:  1. Endometrial adenocarcinoma ([facility] [address]        STAGING: Cancer Staging  Endometrial adenocarcinoma ([facility] [address]  Staging form: Corpus Uteri - Carcinoma And Carcinosarcoma, AJCC 8th Edition  - Clinical: Stage IVB (rcTX, cNX, cM1) - Signed by Nayely Belcher MD on 2022      PRIOR TREATMENT: 5000 cGy in 25 fractions to the pelvis and vaginal cuff followed by sequential boost to the vaginal cuff to 2000 cGy in 10 fractions. TIME SINCE TREATMENT:  1 month    RECENT HISTORY: Ericka Cabrera returns for quick check approximately 1 month status post completion of consolidative radiation therapy for the above diagnosis. She tolerated therapy well without the need for any treatment breaks. Today she notes only some mild fatigue but denies any diarrhea or constipation. She denies any abdominal pain, discomfort, bloating, vaginal bleeding, vaginal discharge, or urinary symptoms at this time. She did see Dr. Marlen Garcia with GYN oncology last week who felt like she had a good clinical response with no evidence of disease at the vaginal cuff and only mild to moderate fibrosis on pelvic exam.    Past medical, surgical, social and family histories reviewed and updated as indicated.     ALLERGIES:  Ciprofloxacin       MEDICATIONS:   Current Outpatient Medications:     atorvastatin (LIPITOR) 10 MG tablet, Take 1 tablet by mouth once daily, Disp: 90 tablet, Rfl: 0    gabapentin (NEURONTIN) 300 MG capsule, Take 1 capsule by mouth 3 times daily for 30 days. , Disp: 90 capsule, Rfl: 2    glipiZIDE (GLUCOTROL XL) 2.5 MG extended release tablet, Take 2 tablets by mouth once daily, Disp: 180 tablet, Rfl: 1    loperamide (IMODIUM) 2 MG capsule, Take 2 mg by mouth 4 times daily as needed for Diarrhea (Patient not taking: Reported on 3/15/2023), Disp: , Rfl:     ONETOUCH ULTRA strip, USE 1 STRIP TO CHECK GLUCOSE THREE TIMES DAILY, Disp: 100 each, Rfl: 5    Lancets (ONETOUCH DELICA PLUS [id number]) MISC, USE 1  TO CHECK GLUCOSE THREE TIMES DAILY, Disp: 100 each, Rfl: 5    prochlorperazine (COMPAZINE) 10 MG tablet, TAKE 1 TABLET BY MOUTH EVERY 6 HOURS AS NEEDED FOR NAUSEA (Patient not taking: No sig reported), Disp: , Rfl:     acetaminophen (TYLENOL) 500 MG tablet, Take 1,000 mg by mouth every 6 hours as needed for Pain, Disp: , Rfl:     ibuprofen (ADVIL;MOTRIN) 200 MG tablet, Take 200 mg by mouth every 6 hours as needed for Pain, Disp: , Rfl:     amLODIPine (NORVASC) 10 MG tablet, Take 1 tablet by mouth once daily (Patient taking differently: 5 mg Take 1 tablet by mouth once daily), Disp: 90 tablet, Rfl: 3    nystatin (MYCOSTATIN) 092493 UNIT/GM cream, Apply topically 2 times daily. (Patient not taking: No sig reported), Disp: 1 each, Rfl: 1    Handicap Placard [facility], by Does not apply route Expires: 7/9/2027, Disp: 1 each, Rfl: 0    tamsulosin (FLOMAX) 0.4 MG capsule, Take 1 capsule by mouth daily, Disp: 90 capsule, Rfl: 3    blood glucose monitor kit and supplies, DX: diabetes mellitus. Test 3 time(s) daily - Ok to substitute per insurance, Disp: 1 kit, Rfl: 1    VITAMIN D PO, Take by mouth, Disp: , Rfl:     CRANBERRY PO, Take by mouth, Disp: , Rfl:     Alcohol Swabs PADS, DX: diabetes mellitus. Test 1-3 time(s) daily - Ok to substitute per insurance (1 each = 1 box), Disp: 1 each, Rfl: 5    aspirin 81 MG tablet, Take 81 mg by mouth daily, Disp: , Rfl:     Review of Systems   All other systems reviewed and are negative.      PHYSICAL EXAMINATION: Vitals:    03/15/23 0930   BP: (!) 142/76   Pulse: 65   Resp: 16   Temp: (!) 96.7 °F (35.9 °C)   TempSrc: Temporal   SpO2: 98%   Weight: 217 lb 9.6 oz (98.7 kg)       Wt Readings from Last 3 Encounters:   03/15/23 217 lb 9.6 oz (98.7 kg)   02/15/23 218 lb 12.8 oz (99.2 kg)   02/09/23 219 lb (99.3 kg)       ECOG PERFORMANCE STATUS:  1     Physical Exam  Vitals and nursing note reviewed. Constitutional:       Appearance: Normal appearance. Comments: Accompanied by her    HENT:      Head: Normocephalic and atraumatic. Eyes:      General: No scleral icterus. Extraocular Movements: Extraocular movements intact. Conjunctiva/sclera: Conjunctivae normal.   Cardiovascular:      Rate and Rhythm: Normal rate. Heart sounds: Normal heart sounds. Pulmonary:      Effort: Pulmonary effort is normal.      Breath sounds: Normal breath sounds. Abdominal:      General: Bowel sounds are normal. There is no distension. Palpations: Abdomen is soft. There is no mass. Tenderness: There is no abdominal tenderness. Genitourinary:     Comments: Deferred  Musculoskeletal:      Cervical back: Normal range of motion and neck supple. Neurological:      Mental Status: She is alert. 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 Of note the patient did not receive any adjuvant therapy after initially being diagnosed in 2011.   Received induction chemotherapy therapy with carboplatin/Taxol with minimal side effects and response and Ca1 25 followed by consolidative radiation therapy to the pelvis inclusive of the vaginal cuff to 5000 cGy 25 fractions followed by cone-down to the pelvis with margin to an additional 2000 cGy in 10 fractions for total of 7000 cGy. She returns for quick check approximately 1 month after completing radiation therapy. I reviewed with her that all acute sequelae of radiation therapy have largely resolved and she appears to be doing quite well. I further reviewed that I discussed her case with GYN oncology who recently saw the patient and felt that she had a good clinical response. The plan is to have repeat MRI of the pelvis in approximately 1 month followed by possible salvage surgery if there is any residual disease versus observation should she have complete response. The patient is amenable to this and all questions were answered. I will see her again at the latest for routine follow-up in May of this year and potentially sooner if needed. In the interim they know they remain available should she have any additional questions or concerns regarding the above plan. ORDERS: MRI of the pelvis with and without contrast in 1 month; Ativan 1 mg premedication for MRI     FOLLOW-UP: Return for routine follow-up on 5/23/2023.     Marylin Alberto MD PhD  Radiation Oncologist, [address] [place], [place] [address]

## 2023-03-30 ENCOUNTER — HOSPITAL ENCOUNTER (OUTPATIENT)
Dept: MRI IMAGING | Age: 68
Discharge: HOME | End: 2023-04-01
Payer: MEDICARE

## 2023-03-30 DIAGNOSIS — C54.1 ENDOMETRIAL ADENOCARCINOMA (HCC): ICD-10-CM

## 2023-03-30 PROCEDURE — 72197 MRI PELVIS W/O & W/DYE: CPT

## 2023-03-30 PROCEDURE — A9577 INJ MULTIHANCE: HCPCS | Performed by: RADIOLOGY

## 2023-03-30 PROCEDURE — 6360000004 HC RX CONTRAST MEDICATION: Performed by: RADIOLOGY

## 2023-03-30 RX ORDER — SODIUM CHLORIDE 0.9 % (FLUSH) 0.9 %
10 SYRINGE (ML) INJECTION PRN
Status: DISCONTINUED | OUTPATIENT
Start: 2023-03-30 | End: 2023-04-02 | Stop reason: HOSPADM

## 2023-03-30 RX ADMIN — GADOBENATE DIMEGLUMINE 20 ML: 529 INJECTION, SOLUTION INTRAVENOUS at 15:04

## 2023-05-03 DIAGNOSIS — N18.31 TYPE 2 DIABETES MELLITUS WITH STAGE 3A CHRONIC KIDNEY DISEASE, WITHOUT LONG-TERM CURRENT USE OF INSULIN (HCC): ICD-10-CM

## 2023-05-03 DIAGNOSIS — E11.22 TYPE 2 DIABETES MELLITUS WITH STAGE 3A CHRONIC KIDNEY DISEASE, WITHOUT LONG-TERM CURRENT USE OF INSULIN (HCC): ICD-10-CM

## 2023-05-05 RX ORDER — ATORVASTATIN CALCIUM 10 MG/1
10 TABLET, FILM COATED ORAL DAILY
Qty: 90 TABLET | Refills: 3 | Status: SHIPPED | OUTPATIENT
Start: 2023-05-05

## 2023-05-17 DIAGNOSIS — M54.16 LUMBAR RADICULOPATHY: ICD-10-CM

## 2023-05-17 RX ORDER — GABAPENTIN 300 MG/1
300 CAPSULE ORAL 3 TIMES DAILY
Qty: 90 CAPSULE | Refills: 2 | Status: SHIPPED | OUTPATIENT
Start: 2023-05-17 | End: 2023-06-16

## 2023-05-17 NOTE — TELEPHONE ENCOUNTER
Pharmacy is requesting medication refill. Please approve or deny this request.    Rx requested:  Requested Prescriptions     Pending Prescriptions Disp Refills    gabapentin (NEURONTIN) 300 MG capsule [Pharmacy Med Name: Gabapentin 300 MG Oral Capsule] 90 capsule 0     Sig: Take 1 capsule by mouth 3 times daily.          Last Office Visit:   10/11/2022      Next Visit Date:  Future Appointments   Date Time Provider Dawna Biggs   5/23/2023  9:30 AM SHELBI Gonzalez ONC NURSE MLOZ RAD ONC [facility]   12/29/2023 10:30 AM TAMIA Delong [facility] (4) no impairment

## 2023-05-23 ENCOUNTER — HOSPITAL ENCOUNTER (OUTPATIENT)
Dept: RADIATION ONCOLOGY | Age: 68
Discharge: HOME | End: 2023-05-23
Payer: MEDICARE

## 2023-05-23 VITALS
RESPIRATION RATE: 18 BRPM | WEIGHT: 222 LBS | TEMPERATURE: 97.7 F | HEART RATE: 62 BPM | DIASTOLIC BLOOD PRESSURE: 75 MMHG | SYSTOLIC BLOOD PRESSURE: 156 MMHG | OXYGEN SATURATION: 98 % | BODY MASS INDEX: 34.77 KG/M2

## 2023-05-23 DIAGNOSIS — C54.1 ENDOMETRIAL ADENOCARCINOMA (HCC): Primary | ICD-10-CM

## 2023-05-23 PROCEDURE — 99214 OFFICE O/P EST MOD 30 MIN: CPT | Performed by: RADIOLOGY

## 2023-05-23 PROCEDURE — 99212 OFFICE O/P EST SF 10 MIN: CPT | Performed by: RADIOLOGY

## 2023-05-23 NOTE — PROGRESS NOTES
NURSING ASSESSMENT     Date: 5/23/2023        Patient Name: Aixa Deng     YOB: 1955      Age:  79 y.o. MRN: [id number]       Chaperone [x] Yes   [] No  -Nolan    Advance Directives:   Do you currently have completed advance directives (living will)? [] Yes   [x] No         *If yes, please bring us a copy for your records. *If no, would you like info or assistance in completing advance directives (living will)? [] Yes   [x] No    Pain Score:   Pain Score (1-10): 8  Pain Location: both knees  Pain Duration: every day lately  Pain Management/Control: tylenol eases pain      Is pain affecting your ability to take care of yourself or move throughout your home? [] Yes   [x] No    General: Fatigue is less  Patient has gained weight [] Yes   [x] No  Patient has lost weight [] Yes   [x] No  How much weight in pounds and over what length of time:     Eyes (Ophthalmic): No Problem     Skin (Dermatological): Rash under abdomen and groin area that she uses nystatin cream     ENT: No Problems     Respiratory: No Problems     Cardiovascular: No Problems      Device   [] Yes   [x] No   Copy of Card Obtained [] Yes   [x] No    Gastrointestinal: appetite is good, moves bowels regularly, stools have been normal without blood or mucous, no pain with BM    Genito-Urinary: straight cath several times day and night for urinary retention     Breast: No Problems     Musculoskeletal: Joint Pain knees    Neurological: No Problems      Hematological and Lymphatic: No Problems     Endocrine: Diabetes Mellitus blood sugars have been around 150-160    Gyn History: No vaginal bleeding or discharge      A 10-point review of systems  has been conducted and pertinent positives have been   recorded. All other review of systems are negative    Was the patient admitted during the course of treatment OR within 30 days of treatment?  no

## 2023-06-17 NOTE — PROGRESS NOTES
17 [facility]           Radiation Oncology      [address] [facility]        [address]        Jac Cardoza: [phone]        F: [phone]       [employer]                   Dr. Taran Decker MD PhD    FOLLOW-UP NOTE     Date of Service: 2023  Patient ID: Anton Kern   :   MRN: [id number]   Acct Number: [de-identified]       NAME:  Anton Kern    :  1955 79 y.o. female     PCP: Joshua Baldwin MD    REFERRING PROVIDER: Rossy Garibay    DIAGNOSIS:  1. Endometrial adenocarcinoma ([facility] [address]        STAGING: Cancer Staging   Endometrial adenocarcinoma ([facility] [address]  Staging form: Corpus Uteri - Carcinoma And Carcinosarcoma, AJCC 8th Edition  - Clinical: Stage IVB (rcTX, cNX, cM1) - Signed by Taran Decker MD on 2022      PRIOR TREATMENT: 5000 cGy in 25 fractions to the pelvis and vaginal cuff followed by sequential boost to the vaginal cuff to 2000 cGy in 10 fractions. TIME SINCE TREATMENT:  3 months     RECENT HISTORY: Anton Kern returns for follow-up approximately 3 months status post completion of consolidative radiation therapy for the above diagnosis. She tolerated therapy well without the need for any treatment breaks. Today she notes only some mild fatigue but denies any diarrhea or constipation. She denies any abdominal pain, discomfort, bloating, vaginal bleeding, vaginal discharge, or urinary symptoms at this time. She is still straight catheterizing without any issues. MRI pelvis on 3/30/23 revealed favorable response, with no evidence of hypermetabolic disease on restaging PET/CT scan on 23. She saw Dr. Rossy Garibay on 23 who felt that she was doing well clinically with recommendation for repeat PET/CT and follow-up in 3 months. Past medical, surgical, social and family histories reviewed and updated as indicated.     ALLERGIES:  Ciprofloxacin       MEDICATIONS:   Current Outpatient Medications:     gabapentin (NEURONTIN) 300 MG

## 2023-06-21 DIAGNOSIS — E11.9 TYPE 2 DIABETES MELLITUS WITHOUT COMPLICATION, WITHOUT LONG-TERM CURRENT USE OF INSULIN (HCC): ICD-10-CM

## 2023-06-21 NOTE — TELEPHONE ENCOUNTER
Comments:     Last Office Visit (last PCP visit):   10/11/2022    Next Visit Date:  Future Appointments   Date Time Provider [address]   8/30/2023 11:00 AM SHELBI Cohn ONC NURSE SHELBI RAD ONC [facility]   12/29/2023 10:30 AM TAMIA Man [facility]       **If hasn't been seen in over a year OR hasn't followed up according to last diabetes/ADHD visit, make appointment for patient before sending refill to provider.     Rx requested:  Requested Prescriptions     Pending Prescriptions Disp Refills    ONETOUCH ULTRA strip [Pharmacy Med Name: OneTouch Ultra Blue In Vitro Strip] 100 each 0     Sig: USE 1 STRIP TO CHECK GLUCOSE THREE TIMES DAILY

## 2023-06-22 RX ORDER — BLOOD SUGAR DIAGNOSTIC
STRIP MISCELLANEOUS
Qty: 100 EACH | Refills: 5 | Status: SHIPPED | OUTPATIENT
Start: 2023-06-22

## 2023-07-04 DIAGNOSIS — E11.22 TYPE 2 DIABETES MELLITUS WITH STAGE 3A CHRONIC KIDNEY DISEASE, WITHOUT LONG-TERM CURRENT USE OF INSULIN (HCC): ICD-10-CM

## 2023-07-04 DIAGNOSIS — N18.31 TYPE 2 DIABETES MELLITUS WITH STAGE 3A CHRONIC KIDNEY DISEASE, WITHOUT LONG-TERM CURRENT USE OF INSULIN (HCC): ICD-10-CM

## 2023-07-05 RX ORDER — GLIPIZIDE 2.5 MG/1
TABLET, EXTENDED RELEASE ORAL
Qty: 180 TABLET | Refills: 0 | Status: SHIPPED | OUTPATIENT
Start: 2023-07-05

## 2023-07-05 NOTE — TELEPHONE ENCOUNTER
Comments:     Last Office Visit (last PCP visit):   10/11/2022    Next Visit Date:  Future Appointments   Date Time Provider [address]   8/30/2023 11:00 AM SHELBI Cohn ONC NURSE MLOZ RAD ONC [facility]   12/29/2023 10:30 AM [facility]       **If hasn't been seen in over a year OR hasn't followed up according to last diabetes/ADHD visit, make appointment for patient before sending refill to provider.     Rx requested:  Requested Prescriptions     Pending Prescriptions Disp Refills    glipiZIDE (GLUCOTROL XL) 2.5 MG extended release tablet [Pharmacy Med Name: glipiZIDE ER 2.5 MG Oral Tablet Extended Release 24 Hour] 180 tablet 0     Sig: Take 2 tablets by mouth once daily

## 2023-08-09 ENCOUNTER — OFFICE VISIT (OUTPATIENT)
Dept: FAMILY MEDICINE CLINIC | Age: 68
End: 2023-08-09

## 2023-08-09 VITALS
BODY MASS INDEX: 34.69 KG/M2 | SYSTOLIC BLOOD PRESSURE: 146 MMHG | HEART RATE: 75 BPM | DIASTOLIC BLOOD PRESSURE: 80 MMHG | TEMPERATURE: 98 F | OXYGEN SATURATION: 99 % | WEIGHT: 221 LBS | HEIGHT: 67 IN

## 2023-08-09 DIAGNOSIS — E11.69 TYPE 2 DIABETES MELLITUS WITH OTHER SPECIFIED COMPLICATION, WITHOUT LONG-TERM CURRENT USE OF INSULIN (HCC): ICD-10-CM

## 2023-08-09 DIAGNOSIS — Z00.00 MEDICARE ANNUAL WELLNESS VISIT, SUBSEQUENT: Primary | ICD-10-CM

## 2023-08-09 DIAGNOSIS — M54.42 ACUTE MIDLINE LOW BACK PAIN WITH LEFT-SIDED SCIATICA: ICD-10-CM

## 2023-08-09 RX ORDER — HYDROCODONE BITARTRATE AND ACETAMINOPHEN 5; 325 MG/1; MG/1
1 TABLET ORAL EVERY 6 HOURS PRN
Qty: 10 TABLET | Refills: 0 | Status: SHIPPED | OUTPATIENT
Start: 2023-08-09 | End: 2023-08-12

## 2023-08-09 RX ORDER — TIZANIDINE 2 MG/1
2 TABLET ORAL 3 TIMES DAILY PRN
Qty: 30 TABLET | Refills: 0 | Status: SHIPPED | OUTPATIENT
Start: 2023-08-09

## 2023-08-09 SDOH — ECONOMIC STABILITY: HOUSING INSECURITY
IN THE LAST 12 MONTHS, WAS THERE A TIME WHEN YOU DID NOT HAVE A STEADY PLACE TO SLEEP OR SLEPT IN A SHELTER (INCLUDING NOW)?: NO

## 2023-08-09 SDOH — ECONOMIC STABILITY: FOOD INSECURITY: WITHIN THE PAST 12 MONTHS, YOU WORRIED THAT YOUR FOOD WOULD RUN OUT BEFORE YOU GOT MONEY TO BUY MORE.: PATIENT DECLINED

## 2023-08-09 SDOH — ECONOMIC STABILITY: INCOME INSECURITY: HOW HARD IS IT FOR YOU TO PAY FOR THE VERY BASICS LIKE FOOD, HOUSING, MEDICAL CARE, AND HEATING?: PATIENT DECLINED

## 2023-08-09 SDOH — ECONOMIC STABILITY: FOOD INSECURITY: WITHIN THE PAST 12 MONTHS, THE FOOD YOU BOUGHT JUST DIDN'T LAST AND YOU DIDN'T HAVE MONEY TO GET MORE.: PATIENT DECLINED

## 2023-08-09 SDOH — HEALTH STABILITY: PHYSICAL HEALTH: ON AVERAGE, HOW MANY DAYS PER WEEK DO YOU ENGAGE IN MODERATE TO STRENUOUS EXERCISE (LIKE A BRISK WALK)?: 0 DAYS

## 2023-08-09 ASSESSMENT — LIFESTYLE VARIABLES
HOW OFTEN DO YOU HAVE A DRINK CONTAINING ALCOHOL: 1
HOW OFTEN DO YOU HAVE SIX OR MORE DRINKS ON ONE OCCASION: 1
HOW MANY STANDARD DRINKS CONTAINING ALCOHOL DO YOU HAVE ON A TYPICAL DAY: 0
HOW MANY STANDARD DRINKS CONTAINING ALCOHOL DO YOU HAVE ON A TYPICAL DAY: PATIENT DOES NOT DRINK
HOW OFTEN DO YOU HAVE A DRINK CONTAINING ALCOHOL: NEVER

## 2023-08-09 ASSESSMENT — PATIENT HEALTH QUESTIONNAIRE - PHQ9
SUM OF ALL RESPONSES TO PHQ9 QUESTIONS 1 & 2: 0
2. FEELING DOWN, DEPRESSED OR HOPELESS: 0
1. LITTLE INTEREST OR PLEASURE IN DOING THINGS: 0
SUM OF ALL RESPONSES TO PHQ QUESTIONS 1-9: 0

## 2023-08-10 ENCOUNTER — TELEPHONE (OUTPATIENT)
Dept: FAMILY MEDICINE CLINIC | Age: 68
End: 2023-08-10

## 2023-08-10 ENCOUNTER — CLINICAL DOCUMENTATION (OUTPATIENT)
Dept: SPIRITUAL SERVICES | Age: 68
End: 2023-08-10

## 2023-08-10 ENCOUNTER — HOSPITAL ENCOUNTER (OUTPATIENT)
Dept: LAB | Age: 68
Discharge: HOME | End: 2023-08-10
Payer: MEDICARE

## 2023-08-10 DIAGNOSIS — E11.69 TYPE 2 DIABETES MELLITUS WITH OTHER SPECIFIED COMPLICATION, WITHOUT LONG-TERM CURRENT USE OF INSULIN (HCC): ICD-10-CM

## 2023-08-10 DIAGNOSIS — Z00.00 MEDICARE ANNUAL WELLNESS VISIT, SUBSEQUENT: ICD-10-CM

## 2023-08-10 LAB
ALBUMIN SERPL-MCNC: 3.9 G/DL (ref 3.5–4.6)
ALP SERPL-CCNC: 149 U/L (ref 40–130)
ALT SERPL-CCNC: 13 U/L (ref 0–33)
ANION GAP SERPL CALCULATED.3IONS-SCNC: 12 MEQ/L (ref 9–15)
AST SERPL-CCNC: 15 U/L (ref 0–35)
BASOPHILS # BLD: 0 K/UL (ref 0–0.2)
BASOPHILS NFR BLD: 0.6 %
BILIRUB SERPL-MCNC: 0.5 MG/DL (ref 0.2–0.7)
BUN SERPL-MCNC: 19 MG/DL (ref 8–23)
CALCIUM SERPL-MCNC: 9.6 MG/DL (ref 8.5–9.9)
CHLORIDE SERPL-SCNC: 105 MEQ/L (ref 95–107)
CHOLEST SERPL-MCNC: 122 MG/DL (ref 0–199)
CO2 SERPL-SCNC: 23 MEQ/L (ref 20–31)
CREAT SERPL-MCNC: 1.38 MG/DL (ref 0.5–0.9)
CREAT UR-MCNC: 90.2 MG/DL
EOSINOPHIL # BLD: 0.2 K/UL (ref 0–0.7)
EOSINOPHIL NFR BLD: 4.7 %
ERYTHROCYTE [DISTWIDTH] IN BLOOD BY AUTOMATED COUNT: 14 % (ref 11.5–14.5)
GLOBULIN SER CALC-MCNC: 3 G/DL (ref 2.3–3.5)
GLUCOSE SERPL-MCNC: 134 MG/DL (ref 70–99)
HCT VFR BLD AUTO: 33.8 % (ref 37–47)
HDLC SERPL-MCNC: 55 MG/DL (ref 40–59)
HGB BLD-MCNC: 11.5 G/DL (ref 12–16)
LDL CHOLESTEROL CALCULATED: 38 MG/DL (ref 0–129)
LYMPHOCYTES # BLD: 0.9 K/UL (ref 1–4.8)
LYMPHOCYTES NFR BLD: 22.6 %
MCH RBC QN AUTO: 33.1 PG (ref 27–31.3)
MCHC RBC AUTO-ENTMCNC: 34 % (ref 33–37)
MCV RBC AUTO: 97.3 FL (ref 79.4–94.8)
MICROALBUMIN UR-MCNC: <1.2 MG/DL
MICROALBUMIN/CREAT UR-RTO: NORMAL MG/G (ref 0–30)
MONOCYTES # BLD: 0.4 K/UL (ref 0.2–0.8)
MONOCYTES NFR BLD: 9.3 %
NEUTROPHILS # BLD: 2.5 K/UL (ref 1.4–6.5)
NEUTS SEG NFR BLD: 62.8 %
PLATELET # BLD AUTO: 236 K/UL (ref 130–400)
POTASSIUM SERPL-SCNC: 4.4 MEQ/L (ref 3.4–4.9)
PROT SERPL-MCNC: 6.9 G/DL (ref 6.3–8)
RBC # BLD AUTO: 3.47 M/UL (ref 4.2–5.4)
SODIUM SERPL-SCNC: 140 MEQ/L (ref 135–144)
TRIGLYCERIDE, FASTING: 145 MG/DL (ref 0–150)
WBC # BLD AUTO: 4 K/UL (ref 4.8–10.8)

## 2023-08-10 PROCEDURE — 36415 COLL VENOUS BLD VENIPUNCTURE: CPT

## 2023-08-10 PROCEDURE — 85025 COMPLETE CBC W/AUTO DIFF WBC: CPT

## 2023-08-10 PROCEDURE — 80053 COMPREHEN METABOLIC PANEL: CPT

## 2023-08-10 PROCEDURE — 80061 LIPID PANEL: CPT

## 2023-08-10 PROCEDURE — 82570 ASSAY OF URINE CREATININE: CPT

## 2023-08-10 PROCEDURE — 82043 UR ALBUMIN QUANTITATIVE: CPT

## 2023-08-10 NOTE — TELEPHONE ENCOUNTER
Pt had an appt on 8/9 and it mentioned that we would place an order for an xray on her  lumber spine. Can an order be placed and medication is not helping pt asking if office can  send a script in for steroids ? Pt asked that office call when order / prescription has been placed.

## 2023-08-10 NOTE — ACP (ADVANCE CARE PLANNING)
:              Outcomes:                [] 3rd -  Date:                Intervention:  [] Spoke with Patient   [] Left Voice mail [] Email / Mail    [] MyChart  [] Other [id number]) : Outcomes:           []  Additional Outreach -  Date:     (Specify Dates & special circumstances): Outcomes:          Thank you for this referral. no

## 2023-08-10 NOTE — TELEPHONE ENCOUNTER
The order for the lumbar spine xray was placed yesterday.   I would like the x-ray to be completed prior to using steroids as steroids may significantly elevate blood sugar levels

## 2023-08-11 ENCOUNTER — HOSPITAL ENCOUNTER (OUTPATIENT)
Age: 68
End: 2023-08-11
Payer: MEDICARE

## 2023-08-11 ENCOUNTER — HOSPITAL ENCOUNTER (OUTPATIENT)
Dept: GENERAL RADIOLOGY | Age: 68
End: 2023-08-11
Payer: MEDICARE

## 2023-08-11 DIAGNOSIS — M54.42 ACUTE MIDLINE LOW BACK PAIN WITH LEFT-SIDED SCIATICA: ICD-10-CM

## 2023-08-11 PROCEDURE — 72110 X-RAY EXAM L-2 SPINE 4/>VWS: CPT

## 2023-08-15 ENCOUNTER — TELEPHONE (OUTPATIENT)
Dept: FAMILY MEDICINE CLINIC | Age: 68
End: 2023-08-15

## 2023-08-15 RX ORDER — PREDNISONE 20 MG/1
20 TABLET ORAL DAILY
Qty: 5 TABLET | Refills: 0 | Status: SHIPPED | OUTPATIENT
Start: 2023-08-15 | End: 2023-08-15 | Stop reason: SDUPTHER

## 2023-08-15 RX ORDER — PREDNISONE 20 MG/1
20 TABLET ORAL DAILY
Qty: 5 TABLET | Refills: 0 | Status: SHIPPED | OUTPATIENT
Start: 2023-08-15 | End: 2023-08-20

## 2023-08-15 NOTE — TELEPHONE ENCOUNTER
Pt following up on the  prescriptions Vicodin and muscle relaxer that she was given last week  pt said neither are  helping at all pt still in a lot of pain . Is there something else she can take ? Pt has company coming up from out of town tomorrow and does not want to be in all this pain . Sally Reed

## 2023-08-15 NOTE — TELEPHONE ENCOUNTER
We will try a low dose steroid. Patient will need to moniitor blood sugars closely. If symtpoms continue, patient will need to reestablish with pain manAGEMENT OR NEUROSURGERY. I will also order follow up MRI. We can change to percocet, however, patient would need to be reevaluated.

## 2023-08-21 ENCOUNTER — OFFICE VISIT (OUTPATIENT)
Dept: FAMILY MEDICINE CLINIC | Age: 68
End: 2023-08-21
Payer: MEDICARE

## 2023-08-21 VITALS
HEART RATE: 65 BPM | HEIGHT: 67 IN | WEIGHT: 212.4 LBS | DIASTOLIC BLOOD PRESSURE: 80 MMHG | SYSTOLIC BLOOD PRESSURE: 138 MMHG | BODY MASS INDEX: 33.34 KG/M2 | TEMPERATURE: 97.9 F | OXYGEN SATURATION: 98 %

## 2023-08-21 DIAGNOSIS — M54.42 ACUTE MIDLINE LOW BACK PAIN WITH LEFT-SIDED SCIATICA: Primary | ICD-10-CM

## 2023-08-21 DIAGNOSIS — D72.819 LEUKOPENIA, UNSPECIFIED TYPE: ICD-10-CM

## 2023-08-21 DIAGNOSIS — R74.8 ALKALINE PHOSPHATASE ELEVATION: Primary | ICD-10-CM

## 2023-08-21 PROCEDURE — 3075F SYST BP GE 130 - 139MM HG: CPT

## 2023-08-21 PROCEDURE — 3079F DIAST BP 80-89 MM HG: CPT

## 2023-08-21 PROCEDURE — 99213 OFFICE O/P EST LOW 20 MIN: CPT

## 2023-08-21 PROCEDURE — 1123F ACP DISCUSS/DSCN MKR DOCD: CPT

## 2023-08-21 RX ORDER — PREDNISONE 20 MG/1
TABLET ORAL
Qty: 8 TABLET | Refills: 0 | Status: SHIPPED | OUTPATIENT
Start: 2023-08-21 | End: 2023-08-30

## 2023-08-21 RX ORDER — OXYCODONE HYDROCHLORIDE AND ACETAMINOPHEN 5; 325 MG/1; MG/1
1 TABLET ORAL EVERY 6 HOURS PRN
Qty: 12 TABLET | Refills: 0 | Status: SHIPPED | OUTPATIENT
Start: 2023-08-21 | End: 2023-08-24

## 2023-08-21 ASSESSMENT — ENCOUNTER SYMPTOMS
BACK PAIN: 1
RESPIRATORY NEGATIVE: 1
COLOR CHANGE: 0

## 2023-08-21 NOTE — PROGRESS NOTES
[address] [facility]          ASSESSMENT/PLAN     Payal Fernandes is a 76 y.o. female who presents with:  Midline low back pain with shooting pain down the back of the left leg. Previously treated with Zanaflex and Norco and 5-day course of 20 mg prednisone by PCP. Patient reports symptoms began to improve with prednisone. X-ray of the lumbar spine shows bone spurs at L2 and 3 and L4 and 5. Self catheterize no loss of bowel. Patient will be restarted on course of prednisone and tapered off over the next 10 days. She is to monitor glucose levels closely during this course of therapy advised to notify CP she sees fasting levels approaching 200. Orders for Percocet as needed for pain advised to use cautiously and minimum dose necessary to treat pain. CT of lumbar spine ordered referral for neurosurgery. 1. Acute midline low back pain with left-sided sciatica  -     CT LUMBAR SPINE WO CONTRAST; Future  -     oxyCODONE-acetaminophen (PERCOCET) 5-325 MG per tablet; Take 1 tablet by mouth every 6 hours as needed for Pain for up to 3 days. Intended supply: 3 days. Take lowest dose possible to manage pain Max Daily Amount: 4 tablets, Disp-12 tablet, R-0Normal  -     predniSONE (DELTASONE) 20 MG tablet; Take 2 tablets by mouth daily for 1 day, THEN 1 tablet daily for 4 days, THEN 0.5 tablets daily for 4 days. , Disp-8 tablet, R-0Normal  -     Sonja Hannon MD, Neurosurgery, [place]           PATIENT REFERRED TO:  Return for Neurosurgery . DISCHARGE MEDICATIONS:  New Prescriptions    OXYCODONE-ACETAMINOPHEN (PERCOCET) 5-325 MG PER TABLET    Take 1 tablet by mouth every 6 hours as needed for Pain for up to 3 days. Intended supply: 3 days. Take lowest dose possible to manage pain Max Daily Amount: 4 tablets    PREDNISONE (DELTASONE) 20 MG TABLET    Take 2 tablets by mouth daily for 1 day, THEN 1 tablet daily for 4 days, THEN 0.5 tablets daily for 4 days.      Cannot display discharge

## 2023-08-21 NOTE — PATIENT INSTRUCTIONS
Monitor glucose levels closely over the course of therapy with prednisone if you start seeing blood glucose levels approaching 200 notify PCP immediately.

## 2023-08-23 ENCOUNTER — HOSPITAL ENCOUNTER (OUTPATIENT)
Dept: CT IMAGING | Age: 68
Discharge: HOME | End: 2023-08-25
Payer: MEDICARE

## 2023-08-23 DIAGNOSIS — C54.1 ENDOMETRIAL ADENOCARCINOMA (HCC): ICD-10-CM

## 2023-08-23 PROCEDURE — 3430000000 HC RX DIAGNOSTIC RADIOPHARMACEUTICAL: Performed by: RADIOLOGY

## 2023-08-23 PROCEDURE — A9552 F18 FDG: HCPCS | Performed by: RADIOLOGY

## 2023-08-23 PROCEDURE — 78815 PET IMAGE W/CT SKULL-THIGH: CPT

## 2023-08-23 RX ORDER — FLUDEOXYGLUCOSE F 18 200 MCI/ML
16.9 INJECTION, SOLUTION INTRAVENOUS
Status: COMPLETED | OUTPATIENT
Start: 2023-08-23 | End: 2023-08-23

## 2023-08-23 RX ADMIN — FLUDEOXYGLUCOSE F 18 16.9 MILLICURIE: 200 INJECTION, SOLUTION INTRAVENOUS at 14:25

## 2023-08-29 DIAGNOSIS — M54.16 LUMBAR RADICULOPATHY: ICD-10-CM

## 2023-08-29 NOTE — TELEPHONE ENCOUNTER
Pt is requesting medication refill. Please approve or deny this request.    Rx requested:  Requested Prescriptions     Pending Prescriptions Disp Refills    gabapentin (NEURONTIN) 300 MG capsule 90 capsule 2     Sig: Take 1 capsule by mouth 3 times daily for 30 days.          Last Office Visit:   8/9/2023      Next Visit Date:  Future Appointments   Date Time Provider [address]   8/30/2023 11:00 AM SCHEDULE, M[facility]Z RAD ONC NURSE [facility] ONC [place] HOD   8/30/2023  2:00 PM [facility] ROOM 1 [facility]  CT [facility] Fac RAD   9/11/2023  9:30 AM [facility] CENTER AT [place]   12/29/2023 10:30 AM TAMIA Baxter [address] [place] [address]   2/9/2024  9:00 AM [address] MD [address]

## 2023-08-30 ENCOUNTER — HOSPITAL ENCOUNTER (OUTPATIENT)
Dept: RADIATION ONCOLOGY | Age: 68
Discharge: HOME | End: 2023-08-30
Payer: MEDICARE

## 2023-08-30 ENCOUNTER — HOSPITAL ENCOUNTER (OUTPATIENT)
Dept: CT IMAGING | Age: 68
Discharge: HOME | End: 2023-09-01
Payer: MEDICARE

## 2023-08-30 VITALS
OXYGEN SATURATION: 97 % | BODY MASS INDEX: 33.2 KG/M2 | WEIGHT: 212 LBS | HEART RATE: 58 BPM | RESPIRATION RATE: 18 BRPM | TEMPERATURE: 96.9 F | DIASTOLIC BLOOD PRESSURE: 74 MMHG | SYSTOLIC BLOOD PRESSURE: 154 MMHG

## 2023-08-30 DIAGNOSIS — M54.42 ACUTE MIDLINE LOW BACK PAIN WITH LEFT-SIDED SCIATICA: ICD-10-CM

## 2023-08-30 DIAGNOSIS — M84.454D: ICD-10-CM

## 2023-08-30 DIAGNOSIS — C54.1 ENDOMETRIAL ADENOCARCINOMA (HCC): Primary | ICD-10-CM

## 2023-08-30 PROCEDURE — 99214 OFFICE O/P EST MOD 30 MIN: CPT | Performed by: RADIOLOGY

## 2023-08-30 PROCEDURE — 99212 OFFICE O/P EST SF 10 MIN: CPT | Performed by: RADIOLOGY

## 2023-08-30 PROCEDURE — 72131 CT LUMBAR SPINE W/O DYE: CPT

## 2023-08-30 NOTE — PROGRESS NOTES
NURSING ASSESSMENT     Date: 8/30/2023        Patient Name: Flores Ashford     YOB: 1955      Age:  76 y.o. MRN: [id number]       Chaperone [x] Yes   [] No  -Nolan    Advance Directives:   Do you currently have completed advance directives (living will)? [] Yes   [x] No         *If yes, please bring us a copy for your records. *If no, would you like info or assistance in completing advance directives (living will)? [] Yes   [x] No    Pain Score:   Pain Score (1-10): 8  Pain Location: left side of tailbone  Pain Duration: 3 weeks ago after cleaning and mopping  Pain Management/Control: tylenol, finished prednisone taper, heat      Is pain affecting your ability to take care of yourself or move throughout your home? [] Yes   [x] No    General: Weight Loss  Patient has gained weight [] Yes   [x] No  Patient has lost weight [x] Yes   [] No  How much weight in pounds and over what length of time: lost 10 lb over the past 3 months eating better    Eyes (Ophthalmic): No Problem     Skin (Dermatological): No Problems     ENT: No Problems     Respiratory: No Problems     Cardiovascular: No Problems      Device   [] Yes   [x] No   Copy of Card Obtained [] Yes   [x] No    Gastrointestinal: Constipation on occasion, takes a stool softener or miralax prn    Genito-Urinary: self caths      Breast: No Problems     Musculoskeletal:  has pain over the past few weeks left side of tailbone, will have a CT L spine done later today  and see Dr Gila Dowling    Neurological: No Problems      Hematological and Lymphatic: No Problems     Endocrine: Diabetes Mellitus blood sugars between 125-140    Gyn History: No vaginal drainage or discharge. Saw Dr Scott Henriquez yesterday. A 10-point review of systems  has been conducted and pertinent positives have been   recorded.  All other review of systems are negative    Was the patient admitted during the course of treatment OR within 30 days of

## 2023-08-31 NOTE — PROGRESS NOTES
Patient Name: Clau Baker :         Date: 2023      Type of Appt: Consult    Reason for appt: X-RAY AT [facility], CT-SCAN AT [facility] ON 23.   Acute midline low back pain with left-sided sciatica    Referred by: Sharon Unger CNP    Pt last seen by Dr. Wilder Hall on: 21    Studies done: 23- CT L/S WO @ [facility]   23- Xray L/S @ [facility]     Smoking: No- Never    REVIEW OF SYSTEMS: Back Pain

## 2023-09-01 RX ORDER — GABAPENTIN 300 MG/1
300 CAPSULE ORAL 3 TIMES DAILY
Qty: 90 CAPSULE | Refills: 2 | Status: SHIPPED | OUTPATIENT
Start: 2023-09-01 | End: 2023-10-01

## 2023-09-11 ENCOUNTER — INITIAL CONSULT (OUTPATIENT)
Dept: NEUROSURGERY | Age: 68
End: 2023-09-11
Payer: MEDICARE

## 2023-09-11 VITALS
WEIGHT: 210 LBS | TEMPERATURE: 97.6 F | DIASTOLIC BLOOD PRESSURE: 80 MMHG | SYSTOLIC BLOOD PRESSURE: 138 MMHG | BODY MASS INDEX: 32.96 KG/M2 | HEIGHT: 67 IN

## 2023-09-11 DIAGNOSIS — M48.062 SPINAL STENOSIS OF LUMBAR REGION WITH NEUROGENIC CLAUDICATION: Primary | ICD-10-CM

## 2023-09-11 PROCEDURE — 3075F SYST BP GE 130 - 139MM HG: CPT | Performed by: NEUROLOGICAL SURGERY

## 2023-09-11 PROCEDURE — 99204 OFFICE O/P NEW MOD 45 MIN: CPT | Performed by: NEUROLOGICAL SURGERY

## 2023-09-11 PROCEDURE — 3079F DIAST BP 80-89 MM HG: CPT | Performed by: NEUROLOGICAL SURGERY

## 2023-09-11 ASSESSMENT — ENCOUNTER SYMPTOMS
SHORTNESS OF BREATH: 0
ABDOMINAL DISTENTION: 0
BACK PAIN: 1
ABDOMINAL PAIN: 0
EYE PAIN: 0
COUGH: 0
TROUBLE SWALLOWING: 0

## 2023-09-11 NOTE — PROGRESS NOTES
NEUROSURGERY CONSULT NOTE      Patient Name: Armando Wagner  Patient : 1955  MRN: [id number]       PCP: Tom Kapadia MD        History of Present Ilness: 76 y.o. presents in neurosurgical consultation from Tom Kapadia MD with LBP. Dx with endometrial cancer , had hysterectomy and told she had recurrence in . Went through [employer] and radtx. She sees gynecologist at [facility] and Dr. Stan Carlton from radiation oncology. She has had LBP for 20 years, worse over time. Was told she has sacral fx after CT of lumbar spine. she tells me that the LBP now better. Was having pain down LLE to above knee, this has resolved for last 2 1/2 weeks. Was taking Percocet and Aleve, now Tylenol. Symptoms are bearable at this point. She denies any weakness or numbness. She denies any pain in her mid back or neck. Chief Complaint   Patient presents with    Back Pain     Lower    Knee Pain    Tailbone Pain          Conservative Treatments:  Physical Therapy: No  NSAID's: Yes  Narcotics: Yes  Muscle relaxants: Yes  Epidural injections: No      Past Medical History:        Diagnosis Date    Allergic rhinitis     Asthma     Chest pain, unspecified 2018    Endometrial cancer, grade I ([address])     [facility]    Hypertension     Kidney problem     blockage in urethra and urine backs up has to get urethra dilated, has scar tissue    Type 2 diabetes mellitus with chronic kidney disease, without long-term current use of insulin ([address]) 2019       Past Surgical History:        Procedure Laterality Date    CATARACT REMOVAL Left 2020    CHOLECYSTECTOMY      COLONOSCOPY N/A 8/10/2022    COLORECTAL CANCER SCREENING, NOT HIGH RISK performed by Sonya Stern MD at [address] [place] [address])      endometrial cancer stage !a    URETHRAL STRICTURE DILATATION      Dr. Magnolia Ibrahim Medications:   Prior to Admission medications    Medication Sig Start Date End Date Taking?

## 2023-09-14 ENCOUNTER — TELEPHONE (OUTPATIENT)
Dept: PAIN MANAGEMENT | Age: 68
End: 2023-09-14

## 2023-09-14 DIAGNOSIS — M48.061 SPINAL STENOSIS OF LUMBAR REGION WITHOUT NEUROGENIC CLAUDICATION: ICD-10-CM

## 2023-09-14 DIAGNOSIS — M48.062 SPINAL STENOSIS OF LUMBAR REGION WITH NEUROGENIC CLAUDICATION: Primary | ICD-10-CM

## 2023-09-14 RX ORDER — LORAZEPAM 1 MG/1
1 TABLET ORAL PRN
Qty: 2 TABLET | Refills: 0 | Status: SHIPPED | OUTPATIENT
Start: 2023-09-14 | End: 2023-09-15

## 2023-09-20 ENCOUNTER — HOSPITAL ENCOUNTER (OUTPATIENT)
Dept: MRI IMAGING | Age: 68
Discharge: HOME | End: 2023-09-22
Attending: NEUROLOGICAL SURGERY
Payer: MEDICARE

## 2023-09-20 DIAGNOSIS — M48.062 SPINAL STENOSIS OF LUMBAR REGION WITH NEUROGENIC CLAUDICATION: ICD-10-CM

## 2023-09-20 PROCEDURE — 72148 MRI LUMBAR SPINE W/O DYE: CPT

## 2023-09-25 DIAGNOSIS — E11.22 TYPE 2 DIABETES MELLITUS WITH STAGE 3A CHRONIC KIDNEY DISEASE, WITHOUT LONG-TERM CURRENT USE OF INSULIN (HCC): ICD-10-CM

## 2023-09-25 DIAGNOSIS — N18.31 TYPE 2 DIABETES MELLITUS WITH STAGE 3A CHRONIC KIDNEY DISEASE, WITHOUT LONG-TERM CURRENT USE OF INSULIN (HCC): ICD-10-CM

## 2023-09-25 NOTE — TELEPHONE ENCOUNTER
Pt is requesting medication refill.  Please approve or deny this request.    Rx requested:  Requested Prescriptions     Pending Prescriptions Disp Refills    glipiZIDE (GLUCOTROL XL) 2.5 MG extended release tablet 180 tablet 0     Sig: Take 2 tablets by mouth daily         Last Office Visit:   8/9/2023      Next Visit Date:  Future Appointments   Date Time Provider [address]   12/29/2023 10:30 AM TAMIA Brandt [facility]   2/9/2024  9:00 AM [address] MD [address]   2/28/2024 10:30 AM SCHEDULE, SHELBI ANGUIANO ONC NURSE [facility]Z RAD ONC [place] HOD

## 2023-09-26 RX ORDER — GLIPIZIDE 2.5 MG/1
5 TABLET, EXTENDED RELEASE ORAL DAILY
Qty: 180 TABLET | Refills: 0 | Status: SHIPPED | OUTPATIENT
Start: 2023-09-26 | End: 2023-11-08

## 2023-10-09 DIAGNOSIS — E11.22 TYPE 2 DIABETES MELLITUS WITH STAGE 3A CHRONIC KIDNEY DISEASE, WITHOUT LONG-TERM CURRENT USE OF INSULIN (HCC): ICD-10-CM

## 2023-10-09 DIAGNOSIS — N18.31 TYPE 2 DIABETES MELLITUS WITH STAGE 3A CHRONIC KIDNEY DISEASE, WITHOUT LONG-TERM CURRENT USE OF INSULIN (HCC): ICD-10-CM

## 2023-10-09 PROBLEM — M84.454D: Status: ACTIVE | Noted: 2023-10-09

## 2023-10-09 RX ORDER — GLIPIZIDE 2.5 MG/1
5 TABLET, EXTENDED RELEASE ORAL DAILY
Qty: 180 TABLET | Refills: 0 | OUTPATIENT
Start: 2023-10-09

## 2023-10-09 NOTE — PROGRESS NOTES
[address] [facility]           Radiation Oncology      [address] [facility], [facility] [facility]        Zak Click: [phone]        F: [phone]       [employer]                   Dr. Linn Campos MD PhD    FOLLOW-UP NOTE     Date of Service: 2023  Patient ID: Robert Russell   :   MRN: [id number]   Acct Number: [de-identified]       NAME:  Robert Russell    :  1955 76 y.o. female     PCP: Viky Martin MD    REFERRING PROVIDER: Carolyn Lopez    DIAGNOSIS:  1. Endometrial adenocarcinoma ([address])    2. Insufficiency fracture of pelvis with routine healing        STAGING: Cancer Staging   Endometrial adenocarcinoma ([address])  Staging form: Corpus Uteri - Carcinoma And Carcinosarcoma, AJCC 8th Edition  - Clinical: Stage IVB (rcTX, cNX, cM1) - Signed by Linn Campos MD on 2022      PRIOR TREATMENT: 5000 cGy in 25 fractions to the pelvis and vaginal cuff followed by sequential boost to the vaginal cuff to 2000 cGy in 10 fractions. TIME SINCE TREATMENT:  6 months     RECENT HISTORY: Robert Russell returns for follow-up approximately 6 months status post completion of consolidative radiation therapy for the above diagnosis. She tolerated therapy well without the need for any treatment breaks. Today she notes only some mild fatigue but denies any diarrhea or constipation. She also notes some occasional constipation. She is still self catheterizing without any difficulty. She has noted some left sciatic pain which is improving however on PET scan dated 2023 revealed a sacral findings most consistent with insufficiency fractures. There or otherwise no areas of gross disease recurrence with posterior changes within the vaginal cuff and perirectal soft tissue nodule no significantly decreased in size. she denies any abdominal pain, discomfort, bloating, vaginal bleeding, vaginal discharge, or urinary symptoms at this time.       Past medical, surgical, social and

## 2023-10-20 ENCOUNTER — HOSPITAL ENCOUNTER (OUTPATIENT)
Dept: WOMENS IMAGING | Age: 68
End: 2023-10-20
Payer: MEDICARE

## 2023-10-20 DIAGNOSIS — Z12.31 BREAST CANCER SCREENING BY MAMMOGRAM: ICD-10-CM

## 2023-10-20 PROCEDURE — 77063 BREAST TOMOSYNTHESIS BI: CPT

## 2023-11-08 DIAGNOSIS — E11.22 TYPE 2 DIABETES MELLITUS WITH STAGE 3A CHRONIC KIDNEY DISEASE, WITHOUT LONG-TERM CURRENT USE OF INSULIN (HCC): ICD-10-CM

## 2023-11-08 DIAGNOSIS — N18.31 TYPE 2 DIABETES MELLITUS WITH STAGE 3A CHRONIC KIDNEY DISEASE, WITHOUT LONG-TERM CURRENT USE OF INSULIN (HCC): ICD-10-CM

## 2023-11-08 RX ORDER — GLIPIZIDE 2.5 MG/1
5 TABLET, EXTENDED RELEASE ORAL DAILY
Qty: 180 TABLET | Refills: 0 | Status: SHIPPED | OUTPATIENT
Start: 2023-11-08

## 2023-12-02 LAB
AVERAGE GLUCOSE: ABNORMAL
HBA1C MFR BLD: 7.2 %

## 2023-12-09 DIAGNOSIS — M54.16 LUMBAR RADICULOPATHY: ICD-10-CM

## 2023-12-11 ENCOUNTER — HOSPITAL ENCOUNTER (OUTPATIENT)
Dept: LAB | Age: 68
Discharge: HOME | End: 2023-12-11
Payer: MEDICARE

## 2023-12-11 LAB
ALBUMIN SERPL-MCNC: 3.8 G/DL (ref 3.5–4.6)
ANION GAP SERPL CALCULATED.3IONS-SCNC: 10 MEQ/L (ref 9–15)
BACTERIA URNS QL MICRO: NEGATIVE /HPF
BILIRUB UR QL STRIP: NEGATIVE
BUN SERPL-MCNC: 16 MG/DL (ref 8–23)
CALCIUM SERPL-MCNC: 9 MG/DL (ref 8.5–9.9)
CHLORIDE SERPL-SCNC: 106 MEQ/L (ref 95–107)
CLARITY UR: CLEAR
CO2 SERPL-SCNC: 26 MEQ/L (ref 20–31)
COLOR UR: YELLOW
CREAT SERPL-MCNC: 1.19 MG/DL (ref 0.5–0.9)
CREAT UR-MCNC: 58.5 MG/DL
EPI CELLS #/AREA URNS AUTO: ABNORMAL /HPF (ref 0–5)
ERYTHROCYTE [DISTWIDTH] IN BLOOD BY AUTOMATED COUNT: 12.7 % (ref 11.5–14.5)
GLUCOSE SERPL-MCNC: 152 MG/DL (ref 70–99)
GLUCOSE UR STRIP-MCNC: NEGATIVE MG/DL
HCT VFR BLD AUTO: 38.8 % (ref 37–47)
HGB BLD-MCNC: 12.3 G/DL (ref 12–16)
HGB UR QL STRIP: NEGATIVE
HYALINE CASTS #/AREA URNS AUTO: ABNORMAL /HPF (ref 0–5)
KETONES UR STRIP-MCNC: NEGATIVE MG/DL
LEUKOCYTE ESTERASE UR QL STRIP: ABNORMAL
MCH RBC QN AUTO: 32.5 PG (ref 27–31.3)
MCHC RBC AUTO-ENTMCNC: 31.7 % (ref 33–37)
MCV RBC AUTO: 102.4 FL (ref 79.4–94.8)
NITRITE UR QL STRIP: NEGATIVE
PH UR STRIP: 6.5 [PH] (ref 5–9)
PHOSPHATE SERPL-MCNC: 3.2 MG/DL (ref 2.3–4.8)
PLATELET # BLD AUTO: 235 K/UL (ref 130–400)
POTASSIUM SERPL-SCNC: 3.8 MEQ/L (ref 3.4–4.9)
PROT UR STRIP-MCNC: NEGATIVE MG/DL
PROT UR-MCNC: 13 MG/DL
PROT/CREAT UR-RTO: 0.2 ML/ML
PROT/CREAT UR-RTO: 0.2 ML/ML (ref 0–0.2)
RBC # BLD AUTO: 3.79 M/UL (ref 4.2–5.4)
RBC #/AREA URNS AUTO: ABNORMAL /HPF (ref 0–5)
SODIUM SERPL-SCNC: 142 MEQ/L (ref 135–144)
SP GR UR STRIP: 1.01 (ref 1–1.03)
UROBILINOGEN UR STRIP-ACNC: 0.2 E.U./DL
WBC # BLD AUTO: 4.2 K/UL (ref 4.8–10.8)
WBC #/AREA URNS AUTO: ABNORMAL /HPF (ref 0–5)

## 2023-12-11 PROCEDURE — 82306 VITAMIN D 25 HYDROXY: CPT

## 2023-12-11 PROCEDURE — 85027 COMPLETE CBC AUTOMATED: CPT

## 2023-12-11 PROCEDURE — 36415 COLL VENOUS BLD VENIPUNCTURE: CPT

## 2023-12-11 PROCEDURE — 83970 ASSAY OF PARATHORMONE: CPT

## 2023-12-11 PROCEDURE — 80069 RENAL FUNCTION PANEL: CPT

## 2023-12-11 PROCEDURE — 84156 ASSAY OF PROTEIN URINE: CPT

## 2023-12-11 PROCEDURE — 81001 URINALYSIS AUTO W/SCOPE: CPT

## 2023-12-11 NOTE — TELEPHONE ENCOUNTER
Comments:     Last Office Visit (last PCP visit):   8/9/2023    Next Visit Date:  Future Appointments   Date Time Provider 4600  [address]   12/29/2023 10:30 AM Reba Lovell PA [address] [place] [address]   2/9/2024  9:00 AM Regis, Lizette Cockayne, MD [address]   2/28/2024 10:30 AM SCHEDULE, MLOZ RAD ONC NURSE OZ RAD ONC [place]ra TELLO       **If hasn't been seen in over a year OR hasn't followed up according to last diabetes/ADHD visit, make appointment for patient before sending refill to provider.     Rx requested:  Requested Prescriptions     Pending Prescriptions Disp Refills    gabapentin (NEURONTIN) 300 MG capsule [Pharmacy Med Name: gabapentin 300 mg capsule] 90 capsule 2     Sig: TAKE 1 CAPSULE BY MOUTH THREE TIMES DAILY for 30 days

## 2023-12-12 RX ORDER — GABAPENTIN 300 MG/1
CAPSULE ORAL
Qty: 90 CAPSULE | Refills: 2 | Status: SHIPPED | OUTPATIENT
Start: 2023-12-12 | End: 2024-01-11

## 2023-12-13 LAB
COMMENT: NORMAL
MISCELLANEOUS LAB TEST RESULT: NORMAL

## 2023-12-15 LAB — VITAMIN D 25-HYDROXY: 45.5 NG/ML (ref 30–100)

## 2024-01-21 NOTE — TELEPHONE ENCOUNTER
Comments:     Last Office Visit (last PCP visit):   12/29/2022    Next Visit Date:  Future Appointments   Date Time Provider Department Center   1/26/2024 10:45 AM Brian Lovell PA [place] [facility]   2/9/2024  9:00 AM Bi Solorio MD [facility]   2/28/2024 10:30 AM SCHEDULE, MLOZ RAD ONC NURSE [facility] ON[facility] [place] [facility]       **If hasn't been seen in over a year OR hasn't followed up according to last diabetes/ADHD visit, make appointment for patient before sending refill to provider.    Rx requested:  Requested Prescriptions     Pending Prescriptions Disp Refills    tamsulosin (FLOMAX) 0.4 MG capsule 90 capsule 3     Sig: Take 1 capsule by mouth daily

## 2024-01-23 RX ORDER — TAMSULOSIN HYDROCHLORIDE 0.4 MG/1
0.4 CAPSULE ORAL DAILY
Qty: 90 CAPSULE | Refills: 3 | OUTPATIENT
Start: 2024-01-23

## 2024-01-26 ENCOUNTER — OFFICE VISIT (OUTPATIENT)
Dept: UROLOGY | Age: 69
End: 2024-01-26
Payer: MEDICARE

## 2024-01-26 VITALS
WEIGHT: 207 LBS | DIASTOLIC BLOOD PRESSURE: 72 MMHG | SYSTOLIC BLOOD PRESSURE: 124 MMHG | HEIGHT: 67 IN | BODY MASS INDEX: 32.49 KG/M2 | HEART RATE: 51 BPM

## 2024-01-26 DIAGNOSIS — N31.9 NEUROGENIC BLADDER: ICD-10-CM

## 2024-01-26 DIAGNOSIS — R33.9 URINARY RETENTION: Primary | ICD-10-CM

## 2024-01-26 DIAGNOSIS — N32.0 BLADDER OUTLET OBSTRUCTION: ICD-10-CM

## 2024-01-26 PROCEDURE — 3078F DIAST BP <80 MM HG: CPT | Performed by: PHYSICIAN ASSISTANT

## 2024-01-26 PROCEDURE — 3074F SYST BP LT 130 MM HG: CPT | Performed by: PHYSICIAN ASSISTANT

## 2024-01-26 PROCEDURE — 1123F ACP DISCUSS/DSCN MKR DOCD: CPT | Performed by: PHYSICIAN ASSISTANT

## 2024-01-26 PROCEDURE — 99203 OFFICE O/P NEW LOW 30 MIN: CPT | Performed by: PHYSICIAN ASSISTANT

## 2024-01-26 RX ORDER — TAMSULOSIN HYDROCHLORIDE 0.4 MG/1
0.4 CAPSULE ORAL DAILY
Qty: 90 CAPSULE | Refills: 3 | Status: SHIPPED | OUTPATIENT
Start: 2024-01-26

## 2024-01-26 RX ORDER — AMLODIPINE BESYLATE 5 MG/1
TABLET ORAL
COMMUNITY
Start: 2024-01-24

## 2024-01-26 ASSESSMENT — ENCOUNTER SYMPTOMS: APNEA: 0

## 2024-01-26 NOTE — PROGRESS NOTES
Subjective:      Patient ID: Julia Castro is a 68 y.o. female    HPI 68 year old female who presents with a known history of having a neurogenic bladder with a bladder outlet obstruction. She has been self catheterizing up to 7 times per day. She admits to occasionally having difficulty inserting the catheter and it can be tender at times. She does void occasionally and takes flomax to help. Denies gross hematuria and dysuria    Past Medical History:   Diagnosis Date    Allergic rhinitis     Asthma     Chest pain, unspecified 8/20/2018    Endometrial cancer, grade I (HCC) 2011    [facility]    Hypertension     Kidney problem     blockage in urethra and urine backs up has to get urethra dilated, has scar tissue    Type 2 diabetes mellitus with chronic kidney disease, without long-term current use of insulin ([facility]) 6/21/2019     Past Surgical History:   Procedure Laterality Date    CATARACT REMOVAL Left 11/2020    CHOLECYSTECTOMY  1990    COLONOSCOPY N/A 8/10/2022    COLORECTAL CANCER SCREENING, NOT HIGH RISK performed by Angela Skaggs MD at [facility] OR    HYSTERECTOMY (CERVIX STATUS UNKNOWN)  2011    endometrial cancer stage !a    URETHRAL STRICTURE DILATATION  2016    Dr. Klein      Social History     Socioeconomic History    Marital status:      Spouse name: None    Number of children: None    Years of education: None    Highest education level: None   Tobacco Use    Smoking status: Never    Smokeless tobacco: Never   Substance and Sexual Activity    Alcohol use: No    Drug use: No     Social Determinants of Health     Financial Resource Strain: Patient Declined (8/9/2023)    Overall Financial Resource Strain (CARDIA)     Difficulty of Paying Living Expenses: Patient declined   Transportation Needs: Unknown (8/9/2023)    PRAPARE - Transportation     Lack of Transportation (Non-Medical): No   Physical Activity: Unknown (8/9/2023)    Exercise Vital Sign     Days of Exercise per Week: 0 days

## 2024-02-09 ENCOUNTER — OFFICE VISIT (OUTPATIENT)
Dept: FAMILY MEDICINE CLINIC | Age: 69
End: 2024-02-09
Payer: MEDICARE

## 2024-02-09 VITALS
SYSTOLIC BLOOD PRESSURE: 130 MMHG | BODY MASS INDEX: 33.2 KG/M2 | DIASTOLIC BLOOD PRESSURE: 64 MMHG | TEMPERATURE: 97 F | WEIGHT: 212 LBS | HEART RATE: 60 BPM | OXYGEN SATURATION: 99 %

## 2024-02-09 DIAGNOSIS — H60.502 ACUTE OTITIS EXTERNA OF LEFT EAR, UNSPECIFIED TYPE: ICD-10-CM

## 2024-02-09 DIAGNOSIS — H92.02 LEFT EAR PAIN: ICD-10-CM

## 2024-02-09 DIAGNOSIS — N18.31 TYPE 2 DIABETES MELLITUS WITH STAGE 3A CHRONIC KIDNEY DISEASE, WITHOUT LONG-TERM CURRENT USE OF INSULIN (HCC): Primary | ICD-10-CM

## 2024-02-09 DIAGNOSIS — E11.22 TYPE 2 DIABETES MELLITUS WITH STAGE 3A CHRONIC KIDNEY DISEASE, WITHOUT LONG-TERM CURRENT USE OF INSULIN (HCC): Primary | ICD-10-CM

## 2024-02-09 DIAGNOSIS — H65.93 MIDDLE EAR EFFUSION, BILATERAL: ICD-10-CM

## 2024-02-09 LAB — HBA1C MFR BLD: 7 %

## 2024-02-09 PROCEDURE — 3078F DIAST BP <80 MM HG: CPT | Performed by: FAMILY MEDICINE

## 2024-02-09 PROCEDURE — 1123F ACP DISCUSS/DSCN MKR DOCD: CPT | Performed by: FAMILY MEDICINE

## 2024-02-09 PROCEDURE — 3075F SYST BP GE 130 - 139MM HG: CPT | Performed by: FAMILY MEDICINE

## 2024-02-09 PROCEDURE — 3051F HG A1C>EQUAL 7.0%<8.0%: CPT | Performed by: FAMILY MEDICINE

## 2024-02-09 PROCEDURE — 83036 HEMOGLOBIN GLYCOSYLATED A1C: CPT | Performed by: FAMILY MEDICINE

## 2024-02-09 PROCEDURE — 99214 OFFICE O/P EST MOD 30 MIN: CPT | Performed by: FAMILY MEDICINE

## 2024-02-09 RX ORDER — NYSTATIN 100000 U/G
CREAM TOPICAL
Qty: 30 G | Refills: 2 | Status: SHIPPED | OUTPATIENT
Start: 2024-02-09

## 2024-02-09 NOTE — PROGRESS NOTES
Subjective:      Patient ID: Julia Castro is a 68 y.o. female who presents today for:     Chief Complaint   Patient presents with    6 Month Follow-Up     States she d/c gabapentin. States this was making her walk off center. Would like L ear evaluated states this is sore unsure if from gabapentin. Asking for a large tube of nystatin cream        HPI  Julia Castro is a very pleasant 68-year-old female presents today to follow-up.  She recently discontinued gabapentin.  It was helpful previously but she feels as though it was making her more \"off balance\".  Since discontinuing medication she has not noticed much of a difference in terms of her overall balance.  She has been complaining of left ear pain.  She denies any hearing deficit or discharge however she has been able to pick out more \"white material\" when she puts her finger in her ear.  She denies any fever or chills.    Diabetes: Has been well-controlled.  Patient denies any polyuria or polydipsia and is adherent to her medication.  She has regular follow-up with nephrology with no need for any medication changes at this time  Past Medical History:   Diagnosis Date    Allergic rhinitis     Asthma     Chest pain, unspecified 8/20/2018    Endometrial cancer, grade I (HCC) 2011    [facility]    Hypertension     Kidney problem     blockage in urethra and urine backs up has to get urethra dilated, has scar tissue    Type 2 diabetes mellitus with chronic kidney disease, without long-term current use of insulin ([facility]) 6/21/2019     Past Surgical History:   Procedure Laterality Date    CATARACT REMOVAL Left 11/2020    CHOLECYSTECTOMY  1990    COLONOSCOPY N/A 8/10/2022    COLORECTAL CANCER SCREENING, NOT HIGH RISK performed by Angela Skaggs MD at [facility] OR    HYSTERECTOMY (CERVIX STATUS UNKNOWN)  2011    endometrial cancer stage !a    URETHRAL STRICTURE DILATATION  2016    Dr. Klein      Family History   Problem Relation Age of Onset    Rheum

## 2024-02-12 DIAGNOSIS — N18.31 TYPE 2 DIABETES MELLITUS WITH STAGE 3A CHRONIC KIDNEY DISEASE, WITHOUT LONG-TERM CURRENT USE OF INSULIN (HCC): ICD-10-CM

## 2024-02-12 DIAGNOSIS — E11.22 TYPE 2 DIABETES MELLITUS WITH STAGE 3A CHRONIC KIDNEY DISEASE, WITHOUT LONG-TERM CURRENT USE OF INSULIN (HCC): ICD-10-CM

## 2024-02-12 DIAGNOSIS — M54.16 LUMBAR RADICULOPATHY: ICD-10-CM

## 2024-02-12 RX ORDER — GABAPENTIN 300 MG/1
CAPSULE ORAL
Qty: 90 CAPSULE | Refills: 0 | OUTPATIENT
Start: 2024-02-12 | End: 2024-03-13

## 2024-02-12 NOTE — TELEPHONE ENCOUNTER
Comments:     Last Office Visit (last PCP visit):   2/9/2024    Next Visit Date:  Future Appointments   Date Time Provider Department Center   2/28/2024 10:30 AM SCHEDULE, SHELBI ANGUIANO ONC NURSE [facility] RAD ONC [place] [facility]   8/9/2024  9:00 AM Bi Solorio MD [facility]   1/30/2025  9:45 AM Brian Lovell PA [place] [facility]       **If hasn't been seen in over a year OR hasn't followed up according to last diabetes/ADHD visit, make appointment for patient before sending refill to provider.    Rx requested:  Requested Prescriptions     Pending Prescriptions Disp Refills    glipiZIDE (GLUCOTROL XL) 2.5 MG extended release tablet [Pharmacy Med Name: glipizide ER 2.5 mg tablet, extended release 24 hr] 180 tablet 0     Sig: Take 2 tablets by mouth daily     Refused Prescriptions Disp Refills    gabapentin (NEURONTIN) 300 MG capsule [Pharmacy Med Name: gabapentin 300 mg capsule] 90 capsule 0     Sig: TAKE 1 CAPSULE BY MOUTH THREE TIMES DAILY for 30 days

## 2024-02-14 RX ORDER — GLIPIZIDE 2.5 MG/1
5 TABLET, EXTENDED RELEASE ORAL DAILY
Qty: 180 TABLET | Refills: 0 | Status: SHIPPED | OUTPATIENT
Start: 2024-02-14

## 2024-02-28 ENCOUNTER — HOSPITAL ENCOUNTER (OUTPATIENT)
Dept: RADIATION ONCOLOGY | Age: 69
End: 2024-02-28
Payer: MEDICARE

## 2024-02-28 ENCOUNTER — HOSPITAL ENCOUNTER (OUTPATIENT)
Dept: LAB | Age: 69
Discharge: HOME | End: 2024-02-28
Payer: MEDICARE

## 2024-02-28 ENCOUNTER — HOSPITAL ENCOUNTER (OUTPATIENT)
Dept: CT IMAGING | Age: 69
Discharge: HOME | End: 2024-03-01
Payer: MEDICARE

## 2024-02-28 DIAGNOSIS — C54.1 ENDOMETRIAL ADENOCARCINOMA (HCC): Primary | ICD-10-CM

## 2024-02-28 DIAGNOSIS — C54.1 ENDOMETRIAL ADENOCARCINOMA (HCC): ICD-10-CM

## 2024-02-28 LAB — CREAT SERPL-MCNC: 1.36 MG/DL (ref 0.5–0.9)

## 2024-02-28 PROCEDURE — 6360000004 HC RX CONTRAST MEDICATION: Performed by: RADIOLOGY

## 2024-02-28 PROCEDURE — 82565 ASSAY OF CREATININE: CPT

## 2024-02-28 PROCEDURE — 74177 CT ABD & PELVIS W/CONTRAST: CPT

## 2024-02-28 PROCEDURE — 36415 COLL VENOUS BLD VENIPUNCTURE: CPT

## 2024-02-28 RX ADMIN — IOPAMIDOL 75 ML: 755 INJECTION, SOLUTION INTRAVENOUS at 07:54

## 2024-03-01 ENCOUNTER — HOSPITAL ENCOUNTER (OUTPATIENT)
Dept: RADIATION ONCOLOGY | Age: 69
Discharge: HOME | End: 2024-03-01
Payer: MEDICARE

## 2024-03-01 VITALS
OXYGEN SATURATION: 98 % | DIASTOLIC BLOOD PRESSURE: 87 MMHG | SYSTOLIC BLOOD PRESSURE: 167 MMHG | TEMPERATURE: 96.7 F | BODY MASS INDEX: 32.89 KG/M2 | WEIGHT: 210 LBS | HEART RATE: 67 BPM | RESPIRATION RATE: 18 BRPM

## 2024-03-01 DIAGNOSIS — C54.1 ENDOMETRIAL ADENOCARCINOMA (HCC): Primary | ICD-10-CM

## 2024-03-01 DIAGNOSIS — C55 MALIGNANT NEOPLASM OF UTERUS, UNSPECIFIED SITE (HCC): ICD-10-CM

## 2024-03-01 PROCEDURE — 99212 OFFICE O/P EST SF 10 MIN: CPT | Performed by: RADIOLOGY

## 2024-03-01 PROCEDURE — 99214 OFFICE O/P EST MOD 30 MIN: CPT | Performed by: RADIOLOGY

## 2024-03-01 NOTE — PROGRESS NOTES
NURSING ASSESSMENT     Date: 3/1/2024        Patient Name: Julia Castro     YOB: 1955      Age:  68 y.o.      MRN: [id number]       Chaperone [x] Yes   [] No      Advance Directives:   Do you currently have completed advance directives (living will)? [] Yes   [x] No         *If yes, please bring us a copy for your records.  *If no, would you like info or assistance in completing advance directives (living will)?   [] Yes   [x] No    Pain Score:   Pain Score (1-10): 5  Pain Location: lower back chronic  Pain Duration: ibuprofen and tylenol  Pain Management/Control: good relief      Is pain affecting your ability to take care of yourself or move throughout your home?                        [] Yes   [x] No    General: No Problems  Patient has gained weight [] Yes   [x] No  Patient has lost weight [] Yes   [x] No  How much weight in pounds and over what length of time:     Eyes (Ophthalmic): No Problem     Skin (Dermatological): has rash in the groin area that is managed with nystatin cream     ENT: No Problems     Respiratory: No Problems     Cardiovascular: No Problems      Device   [] Yes   [x] No   Copy of Card Obtained [] Yes   [x] No    Gastrointestinal: daily bowel movements without blood, mucous or pain    Genito-Urinary: self catheterizes 4-5 times a day, urine has been clear yellow     Breast: No Problems     Musculoskeletal: Back Pain chronic    Neurological: saw neurologist about sciatic pain, pt states he said he could send her to pain management but she declined and is getting adequate pain management with OTC medications      Hematological and Lymphatic: No Problems     Endocrine: Diabetes Mellitus blood sugars have been 145-165    Gyn History: pt denies vaginal pain, itching, dryness, pt is sexually active without any discomfort    A 10-point review of systems  has been conducted and pertinent positives have been   recorded. All other review of systems are negative    Was the

## 2024-03-09 DIAGNOSIS — M54.16 LUMBAR RADICULOPATHY: ICD-10-CM

## 2024-03-10 RX ORDER — GABAPENTIN 300 MG/1
CAPSULE ORAL
Qty: 90 CAPSULE | Refills: 2 | OUTPATIENT
Start: 2024-03-10 | End: 2024-04-09

## 2024-04-16 ENCOUNTER — TELEPHONE (OUTPATIENT)
Dept: FAMILY MEDICINE CLINIC | Age: 69
End: 2024-04-16

## 2024-04-16 NOTE — TELEPHONE ENCOUNTER
Rx refill request received from [facility] for gabapentin. Last OV pt had she stated this medication made her feel off center. Contacted pt to determine if refill was needed Pt states she is still having leg pain, asking for a different medication. Please advise, pt states this was discussed at last OV, would you like an appt to discuss further?

## 2024-05-08 ENCOUNTER — OFFICE VISIT (OUTPATIENT)
Dept: FAMILY MEDICINE CLINIC | Age: 69
End: 2024-05-08
Payer: MEDICARE

## 2024-05-08 VITALS
DIASTOLIC BLOOD PRESSURE: 74 MMHG | TEMPERATURE: 97.9 F | HEART RATE: 80 BPM | WEIGHT: 211.6 LBS | BODY MASS INDEX: 33.21 KG/M2 | OXYGEN SATURATION: 98 % | SYSTOLIC BLOOD PRESSURE: 128 MMHG | HEIGHT: 67 IN

## 2024-05-08 DIAGNOSIS — M54.16 LUMBAR RADICULOPATHY: Primary | ICD-10-CM

## 2024-05-08 DIAGNOSIS — N18.31 TYPE 2 DIABETES MELLITUS WITH STAGE 3A CHRONIC KIDNEY DISEASE, WITHOUT LONG-TERM CURRENT USE OF INSULIN (HCC): ICD-10-CM

## 2024-05-08 DIAGNOSIS — G62.9 NEUROPATHY: ICD-10-CM

## 2024-05-08 DIAGNOSIS — E11.22 TYPE 2 DIABETES MELLITUS WITH STAGE 3A CHRONIC KIDNEY DISEASE, WITHOUT LONG-TERM CURRENT USE OF INSULIN (HCC): ICD-10-CM

## 2024-05-08 DIAGNOSIS — I10 ESSENTIAL HYPERTENSION: ICD-10-CM

## 2024-05-08 PROCEDURE — 99214 OFFICE O/P EST MOD 30 MIN: CPT | Performed by: FAMILY MEDICINE

## 2024-05-08 PROCEDURE — 3078F DIAST BP <80 MM HG: CPT | Performed by: FAMILY MEDICINE

## 2024-05-08 PROCEDURE — 3051F HG A1C>EQUAL 7.0%<8.0%: CPT | Performed by: FAMILY MEDICINE

## 2024-05-08 PROCEDURE — 3074F SYST BP LT 130 MM HG: CPT | Performed by: FAMILY MEDICINE

## 2024-05-08 PROCEDURE — 1123F ACP DISCUSS/DSCN MKR DOCD: CPT | Performed by: FAMILY MEDICINE

## 2024-05-08 RX ORDER — PREGABALIN 25 MG/1
25 CAPSULE ORAL 3 TIMES DAILY
Qty: 21 CAPSULE | Refills: 0 | Status: SHIPPED | OUTPATIENT
Start: 2024-05-08 | End: 2024-05-15

## 2024-05-08 RX ORDER — ATORVASTATIN CALCIUM 10 MG/1
10 TABLET, FILM COATED ORAL DAILY
Qty: 90 TABLET | Refills: 1 | Status: SHIPPED | OUTPATIENT
Start: 2024-05-08

## 2024-05-08 NOTE — PROGRESS NOTES
Subjective:      Patient ID: Julia Castro is a 68 y.o. female who presents today for:  Chief Complaint   Patient presents with    Discuss Medications     Patient presents today to discuss a medication for arthritis pain in toes/feet/knees. Patient previously on gabapentin but did not like the way it made her feel. She uses tylenol that at times manages the pain but not always.        HPI  Julia Castro is a very pleasant 68-year-old female presents today for follow-up.  She has history of lumbar radiculopathy as well as diabetic neuropathy.  She states that the gabapentin was not effective and produced side effects of a \"woozy\" sensation.  She does like to consider something different to control her pain    Diabetes: Well-controlled.  Last hemoglobin A1c has been at goal.  Patient denies any polyuria or polydipsia, or hypoglycemic episodes    Hypertension: Well-controlled.  Patient denies any chest pain, shortness of breath or lower extremity edema      Past Medical History:   Diagnosis Date    Allergic rhinitis     Asthma     Chest pain, unspecified 8/20/2018    Endometrial cancer, grade I ([facility]) 2011    [facility]    Hypertension     Kidney problem     blockage in urethra and urine backs up has to get urethra dilated, has scar tissue    Type 2 diabetes mellitus with chronic kidney disease, without long-term current use of insulin ([facility]) 6/21/2019     Past Surgical History:   Procedure Laterality Date    CATARACT REMOVAL Left 11/2020    CHOLECYSTECTOMY  1990    COLONOSCOPY N/A 8/10/2022    COLORECTAL CANCER SCREENING, NOT HIGH RISK performed by Angela Skaggs MD at [facility] OR    HYSTERECTOMY (CERVIX STATUS UNKNOWN)  2011    endometrial cancer stage !a    URETHRAL STRICTURE DILATATION  2016    Dr. Klein      Family History   Problem Relation Age of Onset    Rheum Arthritis Mother     Osteoporosis Mother     No Known Problems Father         complications afte gallbladder surgery    Osteoarthritis

## 2024-05-11 DIAGNOSIS — N18.31 TYPE 2 DIABETES MELLITUS WITH STAGE 3A CHRONIC KIDNEY DISEASE, WITHOUT LONG-TERM CURRENT USE OF INSULIN (HCC): ICD-10-CM

## 2024-05-11 DIAGNOSIS — E11.22 TYPE 2 DIABETES MELLITUS WITH STAGE 3A CHRONIC KIDNEY DISEASE, WITHOUT LONG-TERM CURRENT USE OF INSULIN (HCC): ICD-10-CM

## 2024-05-11 NOTE — TELEPHONE ENCOUNTER
Comments:     Last Office Visit (last PCP visit):   5/8/2024    Next Visit Date:  Future Appointments   Date Time Provider Department Center   8/9/2024  9:00 AM Osmin, Bi K, MD [facility]   9/20/2024 11:00 AM SCHEDULE, SHE[facility]BI RAD ONC NURSE [facility]   1/30/2025  9:45 AM Brian Lovell PA [place] [facility]         Rx requested:  Requested Prescriptions     Pending Prescriptions Disp Refills    glipiZIDE (GLUCOTROL XL) 2.5 MG extended release tablet 180 tablet 2     Sig: Take 2 tablets by mouth daily

## 2024-05-13 RX ORDER — GLIPIZIDE 2.5 MG/1
5 TABLET, EXTENDED RELEASE ORAL DAILY
Qty: 180 TABLET | Refills: 2 | Status: SHIPPED | OUTPATIENT
Start: 2024-05-13

## 2024-05-13 ASSESSMENT — ENCOUNTER SYMPTOMS
NAUSEA: 0
CHEST TIGHTNESS: 0
APNEA: 0
ABDOMINAL PAIN: 0
COUGH: 0
VOMITING: 0
BACK PAIN: 1
SHORTNESS OF BREATH: 0
CONSTIPATION: 0

## 2024-05-16 ENCOUNTER — TELEPHONE (OUTPATIENT)
Dept: FAMILY MEDICINE CLINIC | Age: 69
End: 2024-05-16

## 2024-05-16 DIAGNOSIS — M54.16 LUMBAR RADICULOPATHY: Primary | ICD-10-CM

## 2024-05-16 DIAGNOSIS — G62.9 NEUROPATHY: ICD-10-CM

## 2024-05-16 NOTE — TELEPHONE ENCOUNTER
Patient calling in to report that she has been taking Lyrica BID since last Saturday and reports still having neuropathy pain. Please advise. Aware you are out of office until Monday.

## 2024-05-21 NOTE — TELEPHONE ENCOUNTER
Pt asked that I let PCP know that she is taking 2 Lyrica a day it is not helping with neuropathy pain . Please advise

## 2024-05-31 NOTE — TELEPHONE ENCOUNTER
If patient is tolerating the medication.   states that he can explore patient taking 2 pills to equal 50 mg to see if it helps with pain and further titrate medication

## 2024-06-07 RX ORDER — PREGABALIN 50 MG/1
50 CAPSULE ORAL 2 TIMES DAILY
Qty: 60 CAPSULE | Refills: 0 | Status: SHIPPED | OUTPATIENT
Start: 2024-06-07 | End: 2024-07-07

## 2024-07-03 DIAGNOSIS — M54.16 LUMBAR RADICULOPATHY: ICD-10-CM

## 2024-07-03 DIAGNOSIS — G62.9 NEUROPATHY: ICD-10-CM

## 2024-07-03 RX ORDER — PREGABALIN 50 MG/1
50 CAPSULE ORAL 2 TIMES DAILY
Qty: 14 CAPSULE | Refills: 0 | Status: SHIPPED | OUTPATIENT
Start: 2024-07-06 | End: 2024-07-03 | Stop reason: SDUPTHER

## 2024-07-03 RX ORDER — PREGABALIN 50 MG/1
50 CAPSULE ORAL 2 TIMES DAILY
Qty: 14 CAPSULE | Refills: 0 | Status: SHIPPED | OUTPATIENT
Start: 2024-07-06 | End: 2024-07-13

## 2024-07-03 NOTE — TELEPHONE ENCOUNTER
Pt has been taking Lyrica TID at Dr Solorio's advice but has ran out. He wanted her to make an appointment to discuss dose change. She did for 7/9/24. Can we send in a 7 day supply to get her to that appointment please?

## 2024-07-03 NOTE — TELEPHONE ENCOUNTER
Dr. Solorio recommended 50 mg dose twice daily and sent a 30-day prescription on 06/07/2024.  If patient is taking as directed she would be due for refill on 07/07/2024.  I have written a 1 week refill for 50 mg BID that she can  on 07/06/2024.

## 2024-07-03 NOTE — TELEPHONE ENCOUNTER
Patient reports Dr. Solorio advised patient she could take 3 a day and see how she feels. Patient ran out of medication yesterday. Can we authorize an early fill?

## 2024-07-09 ENCOUNTER — TELEMEDICINE (OUTPATIENT)
Dept: FAMILY MEDICINE CLINIC | Age: 69
End: 2024-07-09
Payer: MEDICARE

## 2024-07-09 DIAGNOSIS — G62.9 NEUROPATHY: ICD-10-CM

## 2024-07-09 DIAGNOSIS — M54.16 LUMBAR RADICULOPATHY: ICD-10-CM

## 2024-07-09 PROCEDURE — 99213 OFFICE O/P EST LOW 20 MIN: CPT | Performed by: FAMILY MEDICINE

## 2024-07-09 PROCEDURE — 1123F ACP DISCUSS/DSCN MKR DOCD: CPT | Performed by: FAMILY MEDICINE

## 2024-07-09 RX ORDER — PREGABALIN 50 MG/1
50 CAPSULE ORAL 3 TIMES DAILY
Qty: 90 CAPSULE | Refills: 2 | Status: SHIPPED | OUTPATIENT
Start: 2024-07-09 | End: 2024-10-07

## 2024-07-09 ASSESSMENT — ENCOUNTER SYMPTOMS
NAUSEA: 0
CONSTIPATION: 0
APNEA: 0
VOMITING: 0
BACK PAIN: 1
DIARRHEA: 0
COUGH: 0
BLOOD IN STOOL: 0
SHORTNESS OF BREATH: 0
ABDOMINAL PAIN: 0
CHEST TIGHTNESS: 0

## 2024-07-09 NOTE — PROGRESS NOTES
Julia Castro, was evaluated through a synchronous (real-time) audio-video encounter. The patient (or guardian if applicable) is aware that this is a billable service, which includes applicable co-pays. This Virtual Visit was conducted with patient's (and/or legal guardian's) consent. Patient identification was verified, and a caregiver was present when appropriate.   The patient was located at Home: [address] [address]  Provider was located at Facility (Appt Dept): [address] [address]  Confirm you are appropriately licensed, registered, or certified to deliver care in the state where the patient is located as indicated above. If you are not or unsure, please re-schedule the visit: Yes, I confirm.     Julia Castro (:  1955) is a Established patient, presenting virtually for evaluation of the following:    Assessment & Plan   Below is the assessment and plan developed based on review of pertinent history, physical exam, labs, studies, and medications.  1. Lumbar radiculopathy  OARRS appropriate.  Improving:  will support increase in frequency with efforts to find the lowest effective dose  -     pregabalin (LYRICA) 50 MG capsule; Take 1 capsule by mouth 3 times daily for 90 days. Max Daily Amount: 150 mg, Disp-90 capsule, R-2Normal  2. Neuropathy  As above  -     pregabalin (LYRICA) 50 MG capsule; Take 1 capsule by mouth 3 times daily for 90 days. Max Daily Amount: 150 mg, Disp-90 capsule, R-2Normal    Return in about 3 months (around 10/9/2024), or if symptoms worsen or fail to improve.       Subjective   HPI  Julia Castro is a very pleasant 68-year-old female presents today to follow-up on neuropathic pain.  She has tried multiple different medications and was recently started on Lyrica at a low dose twice daily.  She was not able to tolerate Neurontin she states that he did have a positive effect however had persistent symptoms during the

## 2024-07-30 NOTE — TELEPHONE ENCOUNTER
Comments:     Last Office Visit (last PCP visit):   7/9/2024    Next Visit Date:  Future Appointments   Date Time Provider Department Center   8/9/2024  9:00 AM Bi Solorio MD [facility]   9/20/2024 11:00 AM SCHEDULE, SHE[facility]BI RAD ONC NURSE [facility]   1/30/2025  9:45 AM Brian Lovell PA [place] [facility]       **If hasn't been seen in over a year OR hasn't followed up according to last diabetes/ADHD visit, make appointment for patient before sending refill to provider.    Rx requested:  Requested Prescriptions     Pending Prescriptions Disp Refills    nystatin (MYCOSTATIN) 333760 UNIT/GM cream 30 g 2     Sig: Apply topically 2 times daily.

## 2024-07-31 RX ORDER — NYSTATIN 100000 U/G
CREAM TOPICAL
Qty: 30 G | Refills: 2 | Status: SHIPPED | OUTPATIENT
Start: 2024-07-31

## 2024-08-02 ENCOUNTER — HOSPITAL ENCOUNTER (OUTPATIENT)
Dept: LAB | Age: 69
Discharge: HOME | End: 2024-08-02
Payer: MEDICARE

## 2024-08-02 DIAGNOSIS — N18.31 TYPE 2 DIABETES MELLITUS WITH STAGE 3A CHRONIC KIDNEY DISEASE, WITHOUT LONG-TERM CURRENT USE OF INSULIN (HCC): ICD-10-CM

## 2024-08-02 DIAGNOSIS — E11.22 TYPE 2 DIABETES MELLITUS WITH STAGE 3A CHRONIC KIDNEY DISEASE, WITHOUT LONG-TERM CURRENT USE OF INSULIN (HCC): ICD-10-CM

## 2024-08-02 DIAGNOSIS — I10 ESSENTIAL HYPERTENSION: ICD-10-CM

## 2024-08-02 LAB
ALBUMIN SERPL-MCNC: 3.8 G/DL (ref 3.5–4.6)
ALP SERPL-CCNC: 88 U/L (ref 40–130)
ALT SERPL-CCNC: 12 U/L (ref 0–33)
ANION GAP SERPL CALCULATED.3IONS-SCNC: 10 MEQ/L (ref 9–15)
AST SERPL-CCNC: 16 U/L (ref 0–35)
BASOPHILS # BLD: 0 K/UL (ref 0–0.2)
BASOPHILS NFR BLD: 1 %
BILIRUB SERPL-MCNC: 0.5 MG/DL (ref 0.2–0.7)
BUN SERPL-MCNC: 18 MG/DL (ref 8–23)
CALCIUM SERPL-MCNC: 9.3 MG/DL (ref 8.5–9.9)
CHLORIDE SERPL-SCNC: 105 MEQ/L (ref 95–107)
CHOLEST SERPL-MCNC: 142 MG/DL (ref 0–199)
CO2 SERPL-SCNC: 24 MEQ/L (ref 20–31)
CREAT SERPL-MCNC: 1.26 MG/DL (ref 0.5–0.9)
CREAT UR-MCNC: 117 MG/DL
EOSINOPHIL # BLD: 0.1 K/UL (ref 0–0.7)
EOSINOPHIL NFR BLD: 2.9 %
ERYTHROCYTE [DISTWIDTH] IN BLOOD BY AUTOMATED COUNT: 12.5 % (ref 11.5–14.5)
ESTIMATED AVERAGE GLUCOSE: 140 MG/DL
GLOBULIN SER CALC-MCNC: 2.7 G/DL (ref 2.3–3.5)
GLUCOSE SERPL-MCNC: 157 MG/DL (ref 70–99)
HBA1C MFR BLD: 6.5 % (ref 4–6)
HCT VFR BLD AUTO: 36.9 % (ref 37–47)
HDLC SERPL-MCNC: 59 MG/DL (ref 40–59)
HGB BLD-MCNC: 12.1 G/DL (ref 12–16)
LDL CHOLESTEROL: 56 MG/DL (ref 0–129)
LYMPHOCYTES # BLD: 0.8 K/UL (ref 1–4.8)
LYMPHOCYTES NFR BLD: 21 %
MCH RBC QN AUTO: 33.2 PG (ref 27–31.3)
MCHC RBC AUTO-ENTMCNC: 32.8 % (ref 33–37)
MCV RBC AUTO: 101.1 FL (ref 79.4–94.8)
MICROALBUMIN UR-MCNC: 2.9 MG/DL
MICROALBUMIN/CREAT UR-RTO: 24.8 MG/G (ref 0–30)
MONOCYTES # BLD: 0.3 K/UL (ref 0.2–0.8)
MONOCYTES NFR BLD: 7.8 %
NEUTROPHILS # BLD: 2.6 K/UL (ref 1.4–6.5)
NEUTS SEG NFR BLD: 67 %
PLATELET # BLD AUTO: 223 K/UL (ref 130–400)
POTASSIUM SERPL-SCNC: 4.1 MEQ/L (ref 3.4–4.9)
PROT SERPL-MCNC: 6.5 G/DL (ref 6.3–8)
RBC # BLD AUTO: 3.65 M/UL (ref 4.2–5.4)
SODIUM SERPL-SCNC: 139 MEQ/L (ref 135–144)
TRIGLYCERIDE, FASTING: 137 MG/DL (ref 0–150)
WBC # BLD AUTO: 3.9 K/UL (ref 4.8–10.8)

## 2024-08-02 PROCEDURE — 80061 LIPID PANEL: CPT

## 2024-08-02 PROCEDURE — 82043 UR ALBUMIN QUANTITATIVE: CPT

## 2024-08-02 PROCEDURE — 85025 COMPLETE CBC W/AUTO DIFF WBC: CPT

## 2024-08-02 PROCEDURE — 36415 COLL VENOUS BLD VENIPUNCTURE: CPT

## 2024-08-02 PROCEDURE — 82570 ASSAY OF URINE CREATININE: CPT

## 2024-08-02 PROCEDURE — 80053 COMPREHEN METABOLIC PANEL: CPT

## 2024-08-02 PROCEDURE — 83036 HEMOGLOBIN GLYCOSYLATED A1C: CPT

## 2024-08-09 ENCOUNTER — OFFICE VISIT (OUTPATIENT)
Dept: FAMILY MEDICINE CLINIC | Age: 69
End: 2024-08-09
Payer: MEDICARE

## 2024-08-09 VITALS
TEMPERATURE: 97.4 F | DIASTOLIC BLOOD PRESSURE: 74 MMHG | BODY MASS INDEX: 34.06 KG/M2 | OXYGEN SATURATION: 98 % | WEIGHT: 217 LBS | SYSTOLIC BLOOD PRESSURE: 120 MMHG | HEIGHT: 67 IN | HEART RATE: 61 BPM

## 2024-08-09 VITALS
OXYGEN SATURATION: 98 % | TEMPERATURE: 97.4 F | SYSTOLIC BLOOD PRESSURE: 144 MMHG | BODY MASS INDEX: 33.99 KG/M2 | WEIGHT: 217 LBS | DIASTOLIC BLOOD PRESSURE: 80 MMHG | HEART RATE: 61 BPM

## 2024-08-09 DIAGNOSIS — E11.22 TYPE 2 DIABETES MELLITUS WITH STAGE 3A CHRONIC KIDNEY DISEASE, WITHOUT LONG-TERM CURRENT USE OF INSULIN (HCC): ICD-10-CM

## 2024-08-09 DIAGNOSIS — G62.9 NEUROPATHY: ICD-10-CM

## 2024-08-09 DIAGNOSIS — N18.31 TYPE 2 DIABETES MELLITUS WITH STAGE 3A CHRONIC KIDNEY DISEASE, WITHOUT LONG-TERM CURRENT USE OF INSULIN (HCC): ICD-10-CM

## 2024-08-09 DIAGNOSIS — Z00.00 MEDICARE ANNUAL WELLNESS VISIT, SUBSEQUENT: Primary | ICD-10-CM

## 2024-08-09 DIAGNOSIS — E78.5 HYPERLIPIDEMIA, UNSPECIFIED HYPERLIPIDEMIA TYPE: ICD-10-CM

## 2024-08-09 DIAGNOSIS — I10 ESSENTIAL HYPERTENSION: Primary | ICD-10-CM

## 2024-08-09 PROCEDURE — 3079F DIAST BP 80-89 MM HG: CPT | Performed by: FAMILY MEDICINE

## 2024-08-09 PROCEDURE — 3044F HG A1C LEVEL LT 7.0%: CPT | Performed by: FAMILY MEDICINE

## 2024-08-09 PROCEDURE — 3074F SYST BP LT 130 MM HG: CPT | Performed by: FAMILY MEDICINE

## 2024-08-09 PROCEDURE — 1123F ACP DISCUSS/DSCN MKR DOCD: CPT | Performed by: FAMILY MEDICINE

## 2024-08-09 PROCEDURE — G0439 PPPS, SUBSEQ VISIT: HCPCS | Performed by: FAMILY MEDICINE

## 2024-08-09 PROCEDURE — 3078F DIAST BP <80 MM HG: CPT | Performed by: FAMILY MEDICINE

## 2024-08-09 PROCEDURE — 3077F SYST BP >= 140 MM HG: CPT | Performed by: FAMILY MEDICINE

## 2024-08-09 PROCEDURE — 99214 OFFICE O/P EST MOD 30 MIN: CPT | Performed by: FAMILY MEDICINE

## 2024-08-09 RX ORDER — ATORVASTATIN CALCIUM 10 MG/1
10 TABLET, FILM COATED ORAL DAILY
Qty: 90 TABLET | Refills: 1 | Status: SHIPPED | OUTPATIENT
Start: 2024-08-09

## 2024-08-09 RX ORDER — LANOLIN ALCOHOL/MO/W.PET/CERES
50 CREAM (GRAM) TOPICAL DAILY
COMMUNITY

## 2024-08-09 SDOH — ECONOMIC STABILITY: FOOD INSECURITY: WITHIN THE PAST 12 MONTHS, YOU WORRIED THAT YOUR FOOD WOULD RUN OUT BEFORE YOU GOT MONEY TO BUY MORE.: NEVER TRUE

## 2024-08-09 SDOH — ECONOMIC STABILITY: FOOD INSECURITY: WITHIN THE PAST 12 MONTHS, THE FOOD YOU BOUGHT JUST DIDN'T LAST AND YOU DIDN'T HAVE MONEY TO GET MORE.: NEVER TRUE

## 2024-08-09 SDOH — ECONOMIC STABILITY: INCOME INSECURITY: HOW HARD IS IT FOR YOU TO PAY FOR THE VERY BASICS LIKE FOOD, HOUSING, MEDICAL CARE, AND HEATING?: NOT HARD AT ALL

## 2024-08-09 ASSESSMENT — PATIENT HEALTH QUESTIONNAIRE - PHQ9
SUM OF ALL RESPONSES TO PHQ QUESTIONS 1-9: 0
7. TROUBLE CONCENTRATING ON THINGS, SUCH AS READING THE NEWSPAPER OR WATCHING TELEVISION: NOT AT ALL
2. FEELING DOWN, DEPRESSED OR HOPELESS: NOT AT ALL
10. IF YOU CHECKED OFF ANY PROBLEMS, HOW DIFFICULT HAVE THESE PROBLEMS MADE IT FOR YOU TO DO YOUR WORK, TAKE CARE OF THINGS AT HOME, OR GET ALONG WITH OTHER PEOPLE: NOT DIFFICULT AT ALL
5. POOR APPETITE OR OVEREATING: NOT AT ALL
9. THOUGHTS THAT YOU WOULD BE BETTER OFF DEAD, OR OF HURTING YOURSELF: NOT AT ALL
SUM OF ALL RESPONSES TO PHQ QUESTIONS 1-9: 0
SUM OF ALL RESPONSES TO PHQ QUESTIONS 1-9: 0
8. MOVING OR SPEAKING SO SLOWLY THAT OTHER PEOPLE COULD HAVE NOTICED. OR THE OPPOSITE, BEING SO FIGETY OR RESTLESS THAT YOU HAVE BEEN MOVING AROUND A LOT MORE THAN USUAL: NOT AT ALL
3. TROUBLE FALLING OR STAYING ASLEEP: NOT AT ALL
1. LITTLE INTEREST OR PLEASURE IN DOING THINGS: NOT AT ALL
SUM OF ALL RESPONSES TO PHQ QUESTIONS 1-9: 0
4. FEELING TIRED OR HAVING LITTLE ENERGY: NOT AT ALL
SUM OF ALL RESPONSES TO PHQ9 QUESTIONS 1 & 2: 0

## 2024-08-09 ASSESSMENT — LIFESTYLE VARIABLES
HOW MANY STANDARD DRINKS CONTAINING ALCOHOL DO YOU HAVE ON A TYPICAL DAY: PATIENT DOES NOT DRINK
HOW OFTEN DO YOU HAVE A DRINK CONTAINING ALCOHOL: NEVER

## 2024-08-15 NOTE — PROGRESS NOTES
Medicare Annual Wellness Visit    Julia Castro is here for No chief complaint on file.    Assessment & Plan   Medicare annual wellness visit, subsequent  Recommendations for Preventive Services Due: see orders and patient instructions/AVS.  Recommended screening schedule for the next 5-10 years is provided to the patient in written form: see Patient Instructions/AVS.     Return in 6 months (on 2/9/2025).     Subjective       Patient's complete Health Risk Assessment and screening values have been reviewed and are found in Flowsheets. The following problems were reviewed today and where indicated follow up appointments were made and/or referrals ordered.    Positive Risk Factor Screenings with Interventions:                Inactivity:  On average, how many days per week do you engage in moderate to strenuous exercise (like a brisk walk)?: 0 days (!) Abnormal  On average, how many minutes do you engage in exercise at this level?: 0 min  Interventions:  Patient advised to follow up in the office for further evaluation and treatment     Abnormal BMI (obese):  Body mass index is 33.99 kg/m². (!) Abnormal  Interventions:  Patient declines any further evaluation or treatment             Advanced Directives:  Do you have a Living Will?: (!) No    Intervention:  has NO advanced directive - information provided                     Objective   Vitals:    08/09/24 0941 08/09/24 0942   BP: (!) 144/80 120/74   Pulse: 61    Temp: 97.4 °F (36.3 °C)    TempSrc: Infrared    SpO2: 98%    Weight: 98.4 kg (217 lb)    Height: 1.702 m (5' 7\")       Body mass index is 33.99 kg/m².        General Appearance: alert and oriented to person, place and time, well developed and well- nourished, in no acute distress  Skin: warm and dry, no rash or erythema  Head: normocephalic and atraumatic  Eyes: pupils equal, round, and reactive to light, extraocular eye movements intact, conjunctivae normal  ENT: tympanic membrane, external ear and ear

## 2024-08-27 ENCOUNTER — HOSPITAL ENCOUNTER (OUTPATIENT)
Dept: CT IMAGING | Age: 69
Discharge: HOME | End: 2024-08-29
Payer: MEDICARE

## 2024-08-27 DIAGNOSIS — C55 MALIGNANT NEOPLASM OF UTERUS, UNSPECIFIED SITE (HCC): ICD-10-CM

## 2024-08-27 DIAGNOSIS — C54.1 ENDOMETRIAL ADENOCARCINOMA (HCC): ICD-10-CM

## 2024-08-27 PROCEDURE — 6360000004 HC RX CONTRAST MEDICATION: Performed by: RADIOLOGY

## 2024-08-27 PROCEDURE — 74177 CT ABD & PELVIS W/CONTRAST: CPT

## 2024-08-27 RX ORDER — IOPAMIDOL 755 MG/ML
75 INJECTION, SOLUTION INTRAVASCULAR
Status: COMPLETED | OUTPATIENT
Start: 2024-08-27 | End: 2024-08-27

## 2024-08-27 RX ADMIN — IOPAMIDOL 75 ML: 755 INJECTION, SOLUTION INTRAVENOUS at 09:33

## 2024-09-24 DIAGNOSIS — G62.9 NEUROPATHY: ICD-10-CM

## 2024-09-24 DIAGNOSIS — M54.16 LUMBAR RADICULOPATHY: ICD-10-CM

## 2024-09-24 NOTE — TELEPHONE ENCOUNTER
Comments:     Last Office Visit (last PCP visit):   8/9/2024    Next Visit Date:  Future Appointments   Date Time Provider Department Center   10/1/2024  1:30 PM SCHEDULE, [facility] NURSE [facility]   1/30/2025  9:45 AM Brian Lovell PA [place] [facility]       **If hasn't been seen in over a year OR hasn't followed up according to last diabetes/ADHD visit, make appointment for patient before sending refill to provider.    Rx requested:  Requested Prescriptions     Pending Prescriptions Disp Refills    pregabalin (LYRICA) 50 MG capsule [Pharmacy Med Name: pregabalin 50 mg capsule] 90 capsule 2     Sig: Take 1 capsule by mouth 3 times daily for 90 days. Max Daily Amount: 150 mg

## 2024-10-01 ENCOUNTER — HOSPITAL ENCOUNTER (OUTPATIENT)
Dept: RADIATION ONCOLOGY | Age: 69
Discharge: HOME | End: 2024-10-01
Payer: MEDICARE

## 2024-10-01 VITALS
OXYGEN SATURATION: 97 % | WEIGHT: 219.2 LBS | DIASTOLIC BLOOD PRESSURE: 70 MMHG | SYSTOLIC BLOOD PRESSURE: 152 MMHG | TEMPERATURE: 97.7 F | HEART RATE: 64 BPM | RESPIRATION RATE: 18 BRPM | BODY MASS INDEX: 34.33 KG/M2

## 2024-10-01 DIAGNOSIS — K44.9 HIATAL HERNIA: ICD-10-CM

## 2024-10-01 DIAGNOSIS — C54.1 ENDOMETRIAL ADENOCARCINOMA (HCC): Primary | ICD-10-CM

## 2024-10-01 PROCEDURE — 99214 OFFICE O/P EST MOD 30 MIN: CPT | Performed by: RADIOLOGY

## 2024-10-01 PROCEDURE — 99212 OFFICE O/P EST SF 10 MIN: CPT | Performed by: RADIOLOGY

## 2024-10-01 RX ORDER — TRIAMCINOLONE ACETONIDE 0.25 MG/G
CREAM TOPICAL DAILY
COMMUNITY

## 2024-10-01 RX ORDER — PREGABALIN 50 MG/1
50 CAPSULE ORAL 3 TIMES DAILY
Qty: 90 CAPSULE | Refills: 0 | Status: SHIPPED | OUTPATIENT
Start: 2024-10-04 | End: 2024-11-20

## 2024-10-01 NOTE — PROGRESS NOTES
NURSING ASSESSMENT     Date: 10/1/2024        Patient Name: Julia Castro     YOB: 1955      Age:  69 y.o.      MRN: [id number]       Chaperone [x] Yes   [] No  , Nolan    Advance Directives:   Do you currently have completed advance directives (living will)? [] Yes   [x] No         *If yes, please bring us a copy for your records.  *If no, would you like info or assistance in completing advance directives (living will)?   [] Yes   [x] No    Pain Score:   Pain Score (1-10): Moderate   Pain Location: Chronic lower back    Pain Duration: Daily   Pain Management/Control: Tylenol Arthritis with pretty good relief      Is pain affecting your ability to take care of yourself or move throughout your home?                        [] Yes   [x] No    General: No Problems  Patient has gained weight [] Yes   [] No  Patient has lost weight [] Yes   [] No  How much weight in pounds and over what length of time:     Eyes (Ophthalmic): No Problem     Skin (Dermatological): Dry patch to right lower leg near ankle, using kenalog which helps.     ENT: No Problems     Respiratory: No Problems     Cardiovascular: No Problems      Device   [] Yes   [x] No   Copy of Card Obtained [] Yes   [x] No    Gastrointestinal: Daily soft BM's. Denies any blood in stool or pain.    Genito-Urinary: Self catheterization 5-6 times daily of clear yellow urine, 3-4 episodes at night.       Breast: No Problems     Musculoskeletal: Chronic lower back pain    Neurological: No Problems      Hematological and Lymphatic: No Problems     Endocrine: Diabetes Mellitus    Gyn History:   Denies vaginal discharge or bleeding    A 10-point review of systems  has been conducted and pertinent positives have been   recorded. All other review of systems are negative    Was the patient admitted during the course of treatment OR within 30 days of treatment?  No      Additional Comments: F/U with Dr. Dodd 11/26/24. F/U with urology Brian CANTRELL  on 1/30/25

## 2024-10-17 ENCOUNTER — OFFICE VISIT (OUTPATIENT)
Dept: CARDIOTHORACIC SURGERY | Age: 69
End: 2024-10-17
Payer: MEDICARE

## 2024-10-17 ENCOUNTER — PREP FOR PROCEDURE (OUTPATIENT)
Dept: CT IMAGING | Age: 69
End: 2024-10-17

## 2024-10-17 VITALS
WEIGHT: 219 LBS | TEMPERATURE: 98.1 F | OXYGEN SATURATION: 97 % | BODY MASS INDEX: 34.37 KG/M2 | HEART RATE: 77 BPM | HEIGHT: 67 IN

## 2024-10-17 DIAGNOSIS — K44.9 HIATAL HERNIA: Primary | ICD-10-CM

## 2024-10-17 DIAGNOSIS — K44.9 HIATAL HERNIA: ICD-10-CM

## 2024-10-17 PROCEDURE — 1123F ACP DISCUSS/DSCN MKR DOCD: CPT | Performed by: THORACIC SURGERY (CARDIOTHORACIC VASCULAR SURGERY)

## 2024-10-17 PROCEDURE — 99204 OFFICE O/P NEW MOD 45 MIN: CPT | Performed by: THORACIC SURGERY (CARDIOTHORACIC VASCULAR SURGERY)

## 2024-10-17 ASSESSMENT — ENCOUNTER SYMPTOMS
SHORTNESS OF BREATH: 0
ABDOMINAL DISTENTION: 0
STRIDOR: 0
VOMITING: 0
CHOKING: 0
NAUSEA: 0
VOICE CHANGE: 0
DIARRHEA: 0
WHEEZING: 0
COUGH: 0
ABDOMINAL PAIN: 0
SORE THROAT: 0
TROUBLE SWALLOWING: 0
CHEST TIGHTNESS: 0

## 2024-10-17 NOTE — PATIENT INSTRUCTIONS
Patient get a CAT scan to follow-up on a right thoracic abnormality.  We tentatively plan repair of her paraesophageal hernia on October 29.

## 2024-10-17 NOTE — PROGRESS NOTES
Subjective:      Patient ID: Julia Castro is a 69 y.o. female who presents today for:  Chief Complaint   Patient presents with    New Patient       HPI  Patient has known for about 30 years that she has a hiatal hernia.  She was having heartburn but her symptoms have gotten significantly worse in the last couple of months.  She now has food sticking fairly often when she eats.  She is able to wash the food down.  She is still able to eat regular food.  She is also now having episodes of waking up at night coughing and choking on acid.  New CAT scan was obtained which showed worsening of her paraesophageal hernia.    Past Medical History:   Diagnosis Date    Allergic rhinitis     Asthma     Chest pain, unspecified 8/20/2018    Endometrial cancer, grade I ([facility]) 2011    [facility]    Hypertension     Kidney problem     blockage in urethra and urine backs up has to get urethra dilated, has scar tissue    Type 2 diabetes mellitus with chronic kidney disease, without long-term current use of insulin ([facility]) 6/21/2019      Past Surgical History:   Procedure Laterality Date    CATARACT REMOVAL Left 11/2020    CHOLECYSTECTOMY  1990    COLONOSCOPY N/A 8/10/2022    COLORECTAL CANCER SCREENING, NOT HIGH RISK performed by Angela Skaggs MD at [facility] OR    HYSTERECTOMY (CERVIX STATUS UNKNOWN)  2011    endometrial cancer stage !a    URETHRAL STRICTURE DILATATION  2016    Dr. Klein      Social History     Socioeconomic History    Marital status:      Spouse name: Not on file    Number of children: Not on file    Years of education: Not on file    Highest education level: Not on file   Occupational History    Not on file   Tobacco Use    Smoking status: Never    Smokeless tobacco: Never   Substance and Sexual Activity    Alcohol use: No    Drug use: No    Sexual activity: Not on file   Other Topics Concern    Not on file   Social History Narrative    Not on file     Social Determinants of Health     Financial

## 2024-10-21 RX ORDER — SODIUM CHLORIDE 0.9 % (FLUSH) 0.9 %
5-40 SYRINGE (ML) INJECTION EVERY 12 HOURS SCHEDULED
Status: CANCELLED | OUTPATIENT
Start: 2024-10-21

## 2024-10-21 RX ORDER — SODIUM CHLORIDE 9 MG/ML
INJECTION, SOLUTION INTRAVENOUS PRN
Status: CANCELLED | OUTPATIENT
Start: 2024-10-21

## 2024-10-21 RX ORDER — SODIUM CHLORIDE 0.9 % (FLUSH) 0.9 %
5-40 SYRINGE (ML) INJECTION PRN
Status: CANCELLED | OUTPATIENT
Start: 2024-10-21

## 2024-10-22 ENCOUNTER — HOSPITAL ENCOUNTER (OUTPATIENT)
Dept: PREADMISSION TESTING | Age: 69
Discharge: HOME | End: 2024-10-26
Payer: MEDICARE

## 2024-10-22 VITALS
HEIGHT: 68 IN | WEIGHT: 220 LBS | TEMPERATURE: 97.5 F | BODY MASS INDEX: 33.34 KG/M2 | DIASTOLIC BLOOD PRESSURE: 79 MMHG | RESPIRATION RATE: 18 BRPM | HEART RATE: 66 BPM | SYSTOLIC BLOOD PRESSURE: 159 MMHG | OXYGEN SATURATION: 97 %

## 2024-10-22 LAB
ABO + RH BLD: NORMAL
BLD GP AB SCN SERPL QL: NORMAL
ERYTHROCYTE [DISTWIDTH] IN BLOOD BY AUTOMATED COUNT: 12.6 % (ref 11.5–14.5)
HCT VFR BLD AUTO: 34.3 % (ref 37–47)
HGB BLD-MCNC: 11.8 G/DL (ref 12–16)
MCH RBC QN AUTO: 33.6 PG (ref 27–31.3)
MCHC RBC AUTO-ENTMCNC: 34.4 % (ref 33–37)
MCV RBC AUTO: 97.7 FL (ref 79.4–94.8)
PLATELET # BLD AUTO: 241 K/UL (ref 130–400)
RBC # BLD AUTO: 3.51 M/UL (ref 4.2–5.4)
WBC # BLD AUTO: 5.8 K/UL (ref 4.8–10.8)

## 2024-10-22 PROCEDURE — 85027 COMPLETE CBC AUTOMATED: CPT

## 2024-10-22 PROCEDURE — 86901 BLOOD TYPING SEROLOGIC RH(D): CPT

## 2024-10-22 PROCEDURE — 86900 BLOOD TYPING SEROLOGIC ABO: CPT

## 2024-10-22 PROCEDURE — 86850 RBC ANTIBODY SCREEN: CPT

## 2024-10-24 ENCOUNTER — HOSPITAL ENCOUNTER (OUTPATIENT)
Dept: CT IMAGING | Age: 69
Discharge: HOME | End: 2024-10-26
Attending: THORACIC SURGERY (CARDIOTHORACIC VASCULAR SURGERY)
Payer: MEDICARE

## 2024-10-24 DIAGNOSIS — K44.9 HIATAL HERNIA: ICD-10-CM

## 2024-10-24 PROCEDURE — 71250 CT THORAX DX C-: CPT

## 2024-10-28 ENCOUNTER — ANESTHESIA EVENT (OUTPATIENT)
Dept: OPERATING ROOM | Age: 69
End: 2024-10-28
Payer: MEDICARE

## 2024-10-28 NOTE — TELEPHONE ENCOUNTER
Pharmacy is requesting medication refill.  Please approve or deny this request.    Rx requested:  Requested Prescriptions     Pending Prescriptions Disp Refills    atorvastatin (LIPITOR) 10 MG tablet [Pharmacy Med Name: Atorvastatin Calcium 10 MG Oral Tablet] 30 tablet 0     Sig: Take 1 tablet by mouth daily         Last Office Visit:   6/13/2019      Next Visit Date:  Future Appointments   Date Time Provider Dawna Biggs   11/17/2020  9:15 AM MD Coral Garcia
<-- Click to add NO significant Past Surgical History

## 2024-10-28 NOTE — ANESTHESIA PRE PROCEDURE
reviewed   no history of anesthetic complications:   Airway: Mallampati: III  TM distance: >3 FB   Neck ROM: full  Mouth opening: > = 3 FB   Dental:    (+) partials      Pulmonary:normal exam    (+)           asthma:                            Cardiovascular:  Exercise tolerance: good (>4 METS)  (+) hypertension:, CAD: no interval change      ECG reviewed      Echocardiogram reviewed  Stress test reviewed       Beta Blocker:  Not on Beta Blocker         Neuro/Psych:   (+) neuromuscular disease:            GI/Hepatic/Renal:   (+) hiatal hernia, renal disease: CRI          Endo/Other:    (+) DiabetesType II DM, blood dyscrasia: anemia, arthritis: OA., electrolyte abnormalities, malignancy/cancer (Uterine cancer, endometrial cancer).                 Abdominal:   (+) obese          Vascular: negative vascular ROS.         Other Findings:       Anesthesia Plan      general and regional     ASA 3     (GETA w/ RSI + bilateral TAP blocks)  Induction: intravenous and rapid sequence.    MIPS: Postoperative opioids intended and Prophylactic antiemetics administered.  Anesthetic plan and risks discussed with patient.    Use of blood products discussed with patient whom consented to blood products.      Attending anesthesiologist reviewed and agrees with Pre Eval content and Attending anesthesiologist reviewed and agrees with Preprocedure content      Post-op pain plan if not by surgeon: single peripheral nerve block        Brenna Keyes MD   10/29/2024

## 2024-10-29 ENCOUNTER — APPOINTMENT (OUTPATIENT)
Dept: GENERAL RADIOLOGY | Age: 69
DRG: 328 | End: 2024-10-29
Attending: THORACIC SURGERY (CARDIOTHORACIC VASCULAR SURGERY)
Payer: MEDICARE

## 2024-10-29 ENCOUNTER — HOSPITAL ENCOUNTER (INPATIENT)
Age: 69
LOS: 1 days | Discharge: HOME | DRG: 328 | End: 2024-10-30
Attending: THORACIC SURGERY (CARDIOTHORACIC VASCULAR SURGERY) | Admitting: THORACIC SURGERY (CARDIOTHORACIC VASCULAR SURGERY)
Payer: MEDICARE

## 2024-10-29 ENCOUNTER — ANESTHESIA (OUTPATIENT)
Dept: OPERATING ROOM | Age: 69
End: 2024-10-29
Payer: MEDICARE

## 2024-10-29 DIAGNOSIS — Z98.890 S/P REPAIR OF PARAESOPHAGEAL HERNIA: Primary | ICD-10-CM

## 2024-10-29 DIAGNOSIS — Z87.19 S/P REPAIR OF PARAESOPHAGEAL HERNIA: Primary | ICD-10-CM

## 2024-10-29 LAB
GLUCOSE BLD-MCNC: 175 MG/DL (ref 70–99)
GLUCOSE BLD-MCNC: 222 MG/DL (ref 70–99)
PERFORMED ON: ABNORMAL
PERFORMED ON: ABNORMAL

## 2024-10-29 PROCEDURE — 64488 TAP BLOCK BI INJECTION: CPT | Performed by: STUDENT IN AN ORGANIZED HEALTH CARE EDUCATION/TRAINING PROGRAM

## 2024-10-29 PROCEDURE — 2580000003 HC RX 258: Performed by: THORACIC SURGERY (CARDIOTHORACIC VASCULAR SURGERY)

## 2024-10-29 PROCEDURE — 2720000010 HC SURG SUPPLY STERILE: Performed by: THORACIC SURGERY (CARDIOTHORACIC VASCULAR SURGERY)

## 2024-10-29 PROCEDURE — 6360000002 HC RX W HCPCS: Performed by: STUDENT IN AN ORGANIZED HEALTH CARE EDUCATION/TRAINING PROGRAM

## 2024-10-29 PROCEDURE — 2500000003 HC RX 250 WO HCPCS: Performed by: THORACIC SURGERY (CARDIOTHORACIC VASCULAR SURGERY)

## 2024-10-29 PROCEDURE — 2580000003 HC RX 258: Performed by: STUDENT IN AN ORGANIZED HEALTH CARE EDUCATION/TRAINING PROGRAM

## 2024-10-29 PROCEDURE — 6360000002 HC RX W HCPCS: Performed by: THORACIC SURGERY (CARDIOTHORACIC VASCULAR SURGERY)

## 2024-10-29 PROCEDURE — 43281 LAP PARAESOPHAG HERN REPAIR: CPT | Performed by: THORACIC SURGERY (CARDIOTHORACIC VASCULAR SURGERY)

## 2024-10-29 PROCEDURE — 2709999900 HC NON-CHARGEABLE SUPPLY: Performed by: THORACIC SURGERY (CARDIOTHORACIC VASCULAR SURGERY)

## 2024-10-29 PROCEDURE — A4217 STERILE WATER/SALINE, 500 ML: HCPCS | Performed by: THORACIC SURGERY (CARDIOTHORACIC VASCULAR SURGERY)

## 2024-10-29 PROCEDURE — G0378 HOSPITAL OBSERVATION PER HR: HCPCS

## 2024-10-29 PROCEDURE — 1210000000 HC MED SURG R&B

## 2024-10-29 PROCEDURE — 7100000000 HC PACU RECOVERY - FIRST 15 MIN: Performed by: THORACIC SURGERY (CARDIOTHORACIC VASCULAR SURGERY)

## 2024-10-29 PROCEDURE — 71045 X-RAY EXAM CHEST 1 VIEW: CPT

## 2024-10-29 PROCEDURE — 0DQ64ZZ REPAIR STOMACH, PERCUTANEOUS ENDOSCOPIC APPROACH: ICD-10-PCS | Performed by: THORACIC SURGERY (CARDIOTHORACIC VASCULAR SURGERY)

## 2024-10-29 PROCEDURE — 0BQT4ZZ REPAIR DIAPHRAGM, PERCUTANEOUS ENDOSCOPIC APPROACH: ICD-10-PCS | Performed by: THORACIC SURGERY (CARDIOTHORACIC VASCULAR SURGERY)

## 2024-10-29 PROCEDURE — 96372 THER/PROPH/DIAG INJ SC/IM: CPT

## 2024-10-29 PROCEDURE — 3600000004 HC SURGERY LEVEL 4 BASE: Performed by: THORACIC SURGERY (CARDIOTHORACIC VASCULAR SURGERY)

## 2024-10-29 PROCEDURE — 2500000003 HC RX 250 WO HCPCS: Performed by: STUDENT IN AN ORGANIZED HEALTH CARE EDUCATION/TRAINING PROGRAM

## 2024-10-29 PROCEDURE — 3700000001 HC ADD 15 MINUTES (ANESTHESIA): Performed by: THORACIC SURGERY (CARDIOTHORACIC VASCULAR SURGERY)

## 2024-10-29 PROCEDURE — 3700000000 HC ANESTHESIA ATTENDED CARE: Performed by: THORACIC SURGERY (CARDIOTHORACIC VASCULAR SURGERY)

## 2024-10-29 PROCEDURE — 3600000014 HC SURGERY LEVEL 4 ADDTL 15MIN: Performed by: THORACIC SURGERY (CARDIOTHORACIC VASCULAR SURGERY)

## 2024-10-29 PROCEDURE — 7100000001 HC PACU RECOVERY - ADDTL 15 MIN: Performed by: THORACIC SURGERY (CARDIOTHORACIC VASCULAR SURGERY)

## 2024-10-29 PROCEDURE — 94760 N-INVAS EAR/PLS OXIMETRY 1: CPT

## 2024-10-29 RX ORDER — SODIUM CHLORIDE 9 MG/ML
INJECTION, SOLUTION INTRAVENOUS CONTINUOUS
Status: DISCONTINUED | OUTPATIENT
Start: 2024-10-29 | End: 2024-10-29 | Stop reason: HOSPADM

## 2024-10-29 RX ORDER — BISACODYL 10 MG
10 SUPPOSITORY, RECTAL RECTAL DAILY PRN
Status: DISCONTINUED | OUTPATIENT
Start: 2024-10-29 | End: 2024-10-30

## 2024-10-29 RX ORDER — ONDANSETRON 2 MG/ML
4 INJECTION INTRAMUSCULAR; INTRAVENOUS
Status: DISCONTINUED | OUTPATIENT
Start: 2024-10-29 | End: 2024-10-29 | Stop reason: HOSPADM

## 2024-10-29 RX ORDER — SODIUM CHLORIDE 450 MG/100ML
INJECTION, SOLUTION INTRAVENOUS
Status: DISCONTINUED
Start: 2024-10-29 | End: 2024-10-29

## 2024-10-29 RX ORDER — POTASSIUM CHLORIDE 7.45 MG/ML
10 INJECTION INTRAVENOUS PRN
Status: DISCONTINUED | OUTPATIENT
Start: 2024-10-29 | End: 2024-10-30 | Stop reason: HOSPADM

## 2024-10-29 RX ORDER — METOCLOPRAMIDE HYDROCHLORIDE 5 MG/ML
10 INJECTION INTRAMUSCULAR; INTRAVENOUS EVERY 6 HOURS
Status: DISCONTINUED | OUTPATIENT
Start: 2024-10-29 | End: 2024-10-30 | Stop reason: HOSPADM

## 2024-10-29 RX ORDER — DIPHENHYDRAMINE HYDROCHLORIDE 50 MG/ML
25 INJECTION INTRAMUSCULAR; INTRAVENOUS EVERY 6 HOURS PRN
Status: DISCONTINUED | OUTPATIENT
Start: 2024-10-29 | End: 2024-10-30

## 2024-10-29 RX ORDER — SODIUM CHLORIDE 0.9 % (FLUSH) 0.9 %
5-40 SYRINGE (ML) INJECTION EVERY 12 HOURS SCHEDULED
Status: DISCONTINUED | OUTPATIENT
Start: 2024-10-29 | End: 2024-10-29 | Stop reason: HOSPADM

## 2024-10-29 RX ORDER — NALOXONE HYDROCHLORIDE 0.4 MG/ML
INJECTION, SOLUTION INTRAMUSCULAR; INTRAVENOUS; SUBCUTANEOUS PRN
Status: DISCONTINUED | OUTPATIENT
Start: 2024-10-29 | End: 2024-10-29 | Stop reason: HOSPADM

## 2024-10-29 RX ORDER — ONDANSETRON 2 MG/ML
4 INJECTION INTRAMUSCULAR; INTRAVENOUS EVERY 6 HOURS PRN
Status: DISCONTINUED | OUTPATIENT
Start: 2024-10-29 | End: 2024-10-30 | Stop reason: HOSPADM

## 2024-10-29 RX ORDER — SODIUM CHLORIDE 0.9 % (FLUSH) 0.9 %
5-40 SYRINGE (ML) INJECTION EVERY 12 HOURS SCHEDULED
Status: DISCONTINUED | OUTPATIENT
Start: 2024-10-29 | End: 2024-10-30 | Stop reason: HOSPADM

## 2024-10-29 RX ORDER — MIDAZOLAM HYDROCHLORIDE 1 MG/ML
INJECTION, SOLUTION INTRAMUSCULAR; INTRAVENOUS
Status: DISCONTINUED | OUTPATIENT
Start: 2024-10-29 | End: 2024-10-29 | Stop reason: SDUPTHER

## 2024-10-29 RX ORDER — SODIUM CHLORIDE 9 MG/ML
INJECTION, SOLUTION INTRAVENOUS PRN
Status: DISCONTINUED | OUTPATIENT
Start: 2024-10-29 | End: 2024-10-29 | Stop reason: HOSPADM

## 2024-10-29 RX ORDER — SODIUM CHLORIDE 0.9 % (FLUSH) 0.9 %
5-40 SYRINGE (ML) INJECTION PRN
Status: DISCONTINUED | OUTPATIENT
Start: 2024-10-29 | End: 2024-10-30 | Stop reason: HOSPADM

## 2024-10-29 RX ORDER — ONDANSETRON 2 MG/ML
INJECTION INTRAMUSCULAR; INTRAVENOUS
Status: DISCONTINUED | OUTPATIENT
Start: 2024-10-29 | End: 2024-10-29 | Stop reason: SDUPTHER

## 2024-10-29 RX ORDER — ROCURONIUM BROMIDE 10 MG/ML
INJECTION, SOLUTION INTRAVENOUS
Status: DISCONTINUED | OUTPATIENT
Start: 2024-10-29 | End: 2024-10-29 | Stop reason: SDUPTHER

## 2024-10-29 RX ORDER — DEXTROSE, SODIUM CHLORIDE, SODIUM LACTATE, POTASSIUM CHLORIDE, AND CALCIUM CHLORIDE 5; .6; .31; .03; .02 G/100ML; G/100ML; G/100ML; G/100ML; G/100ML
INJECTION, SOLUTION INTRAVENOUS CONTINUOUS
Status: DISCONTINUED | OUTPATIENT
Start: 2024-10-29 | End: 2024-10-30

## 2024-10-29 RX ORDER — FENTANYL CITRATE 0.05 MG/ML
25 INJECTION, SOLUTION INTRAMUSCULAR; INTRAVENOUS EVERY 5 MIN PRN
Status: DISCONTINUED | OUTPATIENT
Start: 2024-10-29 | End: 2024-10-29 | Stop reason: HOSPADM

## 2024-10-29 RX ORDER — OXYCODONE HYDROCHLORIDE 5 MG/1
5 TABLET ORAL
Status: DISCONTINUED | OUTPATIENT
Start: 2024-10-29 | End: 2024-10-29 | Stop reason: HOSPADM

## 2024-10-29 RX ORDER — KETOROLAC TROMETHAMINE 15 MG/ML
15 INJECTION, SOLUTION INTRAMUSCULAR; INTRAVENOUS EVERY 6 HOURS PRN
Status: DISCONTINUED | OUTPATIENT
Start: 2024-10-29 | End: 2024-10-30 | Stop reason: HOSPADM

## 2024-10-29 RX ORDER — PROPOFOL 10 MG/ML
INJECTION, EMULSION INTRAVENOUS
Status: DISCONTINUED | OUTPATIENT
Start: 2024-10-29 | End: 2024-10-29 | Stop reason: SDUPTHER

## 2024-10-29 RX ORDER — NALOXONE HYDROCHLORIDE 0.4 MG/ML
INJECTION, SOLUTION INTRAMUSCULAR; INTRAVENOUS; SUBCUTANEOUS PRN
Status: DISCONTINUED | OUTPATIENT
Start: 2024-10-29 | End: 2024-10-30

## 2024-10-29 RX ORDER — MAGNESIUM SULFATE IN WATER 40 MG/ML
2000 INJECTION, SOLUTION INTRAVENOUS PRN
Status: DISCONTINUED | OUTPATIENT
Start: 2024-10-29 | End: 2024-10-30 | Stop reason: HOSPADM

## 2024-10-29 RX ORDER — SODIUM CHLORIDE 0.9 % (FLUSH) 0.9 %
5-40 SYRINGE (ML) INJECTION PRN
Status: DISCONTINUED | OUTPATIENT
Start: 2024-10-29 | End: 2024-10-29 | Stop reason: HOSPADM

## 2024-10-29 RX ORDER — ROPIVACAINE HYDROCHLORIDE 5 MG/ML
INJECTION, SOLUTION EPIDURAL; INFILTRATION; PERINEURAL
Status: COMPLETED | OUTPATIENT
Start: 2024-10-29 | End: 2024-10-29

## 2024-10-29 RX ORDER — ACETAMINOPHEN 650 MG/1
650 SUPPOSITORY RECTAL EVERY 4 HOURS PRN
Status: DISCONTINUED | OUTPATIENT
Start: 2024-10-29 | End: 2024-10-30 | Stop reason: HOSPADM

## 2024-10-29 RX ORDER — MAGNESIUM HYDROXIDE 1200 MG/15ML
LIQUID ORAL CONTINUOUS PRN
Status: DISCONTINUED | OUTPATIENT
Start: 2024-10-29 | End: 2024-10-29 | Stop reason: HOSPADM

## 2024-10-29 RX ORDER — ACETAMINOPHEN 325 MG/1
650 TABLET ORAL
Status: DISCONTINUED | OUTPATIENT
Start: 2024-10-29 | End: 2024-10-29 | Stop reason: HOSPADM

## 2024-10-29 RX ORDER — FENTANYL CITRATE 50 UG/ML
INJECTION, SOLUTION INTRAMUSCULAR; INTRAVENOUS
Status: DISCONTINUED | OUTPATIENT
Start: 2024-10-29 | End: 2024-10-29 | Stop reason: SDUPTHER

## 2024-10-29 RX ORDER — DEXAMETHASONE SODIUM PHOSPHATE 4 MG/ML
INJECTION, SOLUTION INTRA-ARTICULAR; INTRALESIONAL; INTRAMUSCULAR; INTRAVENOUS; SOFT TISSUE
Status: DISCONTINUED | OUTPATIENT
Start: 2024-10-29 | End: 2024-10-29 | Stop reason: SDUPTHER

## 2024-10-29 RX ORDER — DIPHENHYDRAMINE HCL 25 MG
25 TABLET ORAL EVERY 6 HOURS PRN
Status: DISCONTINUED | OUTPATIENT
Start: 2024-10-29 | End: 2024-10-30

## 2024-10-29 RX ORDER — FAMOTIDINE 20 MG/1
20 TABLET, FILM COATED ORAL 2 TIMES DAILY
Status: DISCONTINUED | OUTPATIENT
Start: 2024-10-29 | End: 2024-10-30 | Stop reason: HOSPADM

## 2024-10-29 RX ORDER — SODIUM CHLORIDE 9 MG/ML
INJECTION, SOLUTION INTRAVENOUS PRN
Status: DISCONTINUED | OUTPATIENT
Start: 2024-10-29 | End: 2024-10-30 | Stop reason: HOSPADM

## 2024-10-29 RX ORDER — ENOXAPARIN SODIUM 100 MG/ML
40 INJECTION SUBCUTANEOUS DAILY
Status: DISCONTINUED | OUTPATIENT
Start: 2024-10-29 | End: 2024-10-30 | Stop reason: HOSPADM

## 2024-10-29 RX ORDER — ONDANSETRON 4 MG/1
4 TABLET, ORALLY DISINTEGRATING ORAL EVERY 8 HOURS PRN
Status: DISCONTINUED | OUTPATIENT
Start: 2024-10-29 | End: 2024-10-30 | Stop reason: HOSPADM

## 2024-10-29 RX ORDER — LIDOCAINE HYDROCHLORIDE 10 MG/ML
INJECTION, SOLUTION EPIDURAL; INFILTRATION; INTRACAUDAL; PERINEURAL
Status: DISCONTINUED | OUTPATIENT
Start: 2024-10-29 | End: 2024-10-29 | Stop reason: SDUPTHER

## 2024-10-29 RX ORDER — EPHEDRINE SULFATE/0.9% NACL/PF 25 MG/5 ML
SYRINGE (ML) INTRAVENOUS
Status: DISCONTINUED | OUTPATIENT
Start: 2024-10-29 | End: 2024-10-29 | Stop reason: SDUPTHER

## 2024-10-29 RX ORDER — LIDOCAINE HYDROCHLORIDE 10 MG/ML
1 INJECTION, SOLUTION EPIDURAL; INFILTRATION; INTRACAUDAL; PERINEURAL
Status: DISCONTINUED | OUTPATIENT
Start: 2024-10-29 | End: 2024-10-29 | Stop reason: HOSPADM

## 2024-10-29 RX ORDER — SODIUM CHLORIDE 9 MG/ML
INJECTION, SOLUTION INTRAVENOUS CONTINUOUS
Status: DISCONTINUED | OUTPATIENT
Start: 2024-10-29 | End: 2024-10-30

## 2024-10-29 RX ADMIN — ONDANSETRON 4 MG: 2 INJECTION, SOLUTION INTRAMUSCULAR; INTRAVENOUS at 08:35

## 2024-10-29 RX ADMIN — DEXAMETHASONE SODIUM PHOSPHATE 4 MG: 4 INJECTION INTRA-ARTICULAR; INTRALESIONAL; INTRAMUSCULAR; INTRAVENOUS; SOFT TISSUE at 07:45

## 2024-10-29 RX ADMIN — EPHEDRINE SULFATE 5 MG: 5 INJECTION INTRAVENOUS at 07:42

## 2024-10-29 RX ADMIN — SODIUM CHLORIDE: 9 INJECTION, SOLUTION INTRAVENOUS at 14:52

## 2024-10-29 RX ADMIN — METOCLOPRAMIDE HYDROCHLORIDE 10 MG: 5 INJECTION INTRAMUSCULAR; INTRAVENOUS at 21:14

## 2024-10-29 RX ADMIN — PROPOFOL 170 MG: 10 INJECTION, EMULSION INTRAVENOUS at 07:30

## 2024-10-29 RX ADMIN — METOCLOPRAMIDE HYDROCHLORIDE 10 MG: 5 INJECTION INTRAMUSCULAR; INTRAVENOUS at 16:00

## 2024-10-29 RX ADMIN — ROCURONIUM BROMIDE 100 MG: 10 INJECTION, SOLUTION INTRAVENOUS at 07:30

## 2024-10-29 RX ADMIN — FAMOTIDINE 20 MG: 10 INJECTION, SOLUTION INTRAVENOUS at 21:14

## 2024-10-29 RX ADMIN — EPHEDRINE SULFATE 5 MG: 5 INJECTION INTRAVENOUS at 07:50

## 2024-10-29 RX ADMIN — ENOXAPARIN SODIUM 40 MG: 100 INJECTION SUBCUTANEOUS at 19:17

## 2024-10-29 RX ADMIN — ROPIVACAINE HYDROCHLORIDE 40 ML: 5 INJECTION, SOLUTION EPIDURAL; INFILTRATION; PERINEURAL at 07:35

## 2024-10-29 RX ADMIN — LIDOCAINE HYDROCHLORIDE 50 MG: 10 INJECTION, SOLUTION EPIDURAL; INFILTRATION; INTRACAUDAL; PERINEURAL at 07:30

## 2024-10-29 RX ADMIN — CEFAZOLIN 2000 MG: 2 INJECTION, POWDER, FOR SOLUTION INTRAMUSCULAR; INTRAVENOUS at 07:45

## 2024-10-29 RX ADMIN — HYDROMORPHONE HYDROCHLORIDE 0.5 MG: 1 INJECTION, SOLUTION INTRAMUSCULAR; INTRAVENOUS; SUBCUTANEOUS at 09:38

## 2024-10-29 RX ADMIN — SODIUM CHLORIDE: 9 INJECTION, SOLUTION INTRAVENOUS at 06:37

## 2024-10-29 RX ADMIN — FENTANYL CITRATE 25 MCG: 0.05 INJECTION, SOLUTION INTRAMUSCULAR; INTRAVENOUS at 10:01

## 2024-10-29 RX ADMIN — MIDAZOLAM HYDROCHLORIDE 2 MG: 1 INJECTION, SOLUTION INTRAMUSCULAR; INTRAVENOUS at 07:25

## 2024-10-29 RX ADMIN — SODIUM CHLORIDE: 9 INJECTION, SOLUTION INTRAVENOUS at 08:22

## 2024-10-29 RX ADMIN — SODIUM CHLORIDE, SODIUM LACTATE, POTASSIUM CHLORIDE, CALCIUM CHLORIDE AND DEXTROSE MONOHYDRATE: 5; 600; 310; 30; 20 INJECTION, SOLUTION INTRAVENOUS at 19:14

## 2024-10-29 RX ADMIN — Medication: at 10:12

## 2024-10-29 RX ADMIN — FENTANYL CITRATE 100 MCG: 50 INJECTION, SOLUTION INTRAMUSCULAR; INTRAVENOUS at 07:30

## 2024-10-29 RX ADMIN — SUGAMMADEX 200 MG: 100 INJECTION, SOLUTION INTRAVENOUS at 09:07

## 2024-10-29 RX ADMIN — FENTANYL CITRATE 50 MCG: 50 INJECTION, SOLUTION INTRAMUSCULAR; INTRAVENOUS at 08:10

## 2024-10-29 ASSESSMENT — PAIN DESCRIPTION - LOCATION
LOCATION: ABDOMEN

## 2024-10-29 ASSESSMENT — PAIN SCALES - GENERAL
PAINLEVEL_OUTOF10: 6
PAINLEVEL_OUTOF10: 6
PAINLEVEL_OUTOF10: 8
PAINLEVEL_OUTOF10: 4
PAINLEVEL_OUTOF10: 0
PAINLEVEL_OUTOF10: 5
PAINLEVEL_OUTOF10: 6

## 2024-10-29 ASSESSMENT — PAIN DESCRIPTION - DESCRIPTORS
DESCRIPTORS: PRESSURE
DESCRIPTORS: PRESSURE

## 2024-10-29 ASSESSMENT — PAIN - FUNCTIONAL ASSESSMENT: PAIN_FUNCTIONAL_ASSESSMENT: 0-10

## 2024-10-29 NOTE — ANESTHESIA POSTPROCEDURE EVALUATION
Department of Anesthesiology  Postprocedure Note    Patient: Julia Castro  MRN: [id number]  YOB: 1955  Date of evaluation: 10/29/2024    Procedure Summary       Date: 10/29/24 Room / Location: [address]    Anesthesia Start: 0725 Anesthesia Stop: 0921    Procedure: laparoscopic hiatal hernia repair with fundoplication WITH GASTROPEXY (Chest) Diagnosis:       Hiatal hernia      (Hiatal hernia [K44.9])    Surgeons: Baljit Callejas MD Responsible Provider: Brenna Keyes MD    Anesthesia Type: General, Regional ASA Status: 3            Anesthesia Type: General, Regional    Mikey Phase I: Mikey Score: 10    Mikey Phase II:      Anesthesia Post Evaluation    Patient location during evaluation: bedside  Patient participation: complete - patient participated  Level of consciousness: awake and awake and alert  Airway patency: patent  Nausea & Vomiting: no nausea and no vomiting  Cardiovascular status: blood pressure returned to baseline and hemodynamically stable  Respiratory status: acceptable  Hydration status: euvolemic  Pain management: adequate    No notable events documented.

## 2024-10-29 NOTE — ANESTHESIA PROCEDURE NOTES
Peripheral Block    Patient location during procedure: OR  Reason for block: post-op pain management and at surgeon's request  Start time: 10/29/2024 7:35 AM  End time: 10/29/2024 7:40 AM  Staffing  Performed: anesthesiologist   Anesthesiologist: Brenna Keyes MD  Performed by: Brenan Keyes MD  Authorized by: Brenna Keyes MD    Preanesthetic Checklist  Completed: patient identified, IV checked, site marked, risks and benefits discussed, surgical/procedural consents, equipment checked, pre-op evaluation, timeout performed, anesthesia consent given, oxygen available and monitors applied/VS acknowledged  Peripheral Block   Patient position: supine  Prep: ChloraPrep  Provider prep: mask and sterile gloves  Patient monitoring: cardiac monitor, continuous pulse ox, frequent blood pressure checks and IV access  Block type: TAP  Laterality: bilateral  Injection technique: single-shot  Guidance: ultrasound guided    Needle   Needle type: combined needle/nerve stimulator   Needle gauge: 22 G  Needle localization: anatomical landmarks and ultrasound guidance  Needle length: 10 cm  Assessment   Injection assessment: negative aspiration for heme, no paresthesia on injection, local visualized surrounding nerve on ultrasound and no intravascular symptoms  Paresthesia pain: none  Slow fractionated injection: yes  Hemodynamics: stable  Outcomes: uncomplicated and patient tolerated procedure well    Additional Notes  Ultrasound image in chart.       Consent obtained. Patient under general anesthesia and site prepped. Ultrasound used to visualize muscle layers and needle advanced under US guidance, local anesthetic deposited in transversus abdominus plane. Negative aspiration. A total of 40cc ropivacaine + 20cc NS injected, 30cc on each side. Patient tolerated well.   Medications Administered  ropivacaine (NAROPIN) injection 0.5% - Perineural   40 mL - 10/29/2024 7:35:00 AM

## 2024-10-29 NOTE — OP NOTE
Operative Note      Patient: Julia Castro  YOB: 1955  MRN: [id number]    Date of Procedure: 10/29/2024    Pre-Op Diagnosis Codes:      * Hiatal hernia [K44.9]    Post-Op Diagnosis: Same       Procedure(s):  laparoscopic hiatal hernia repair with fundoplication WITH GASTROPEXY    Surgeon(s):  Baljit Callejas MD    Assistant:   First Assistant: Zohra Arteaga    Anesthesia: General    Estimated Blood Loss (mL): Minimal    Complications: None    Specimens:   * No specimens in log *    Implants:  * No implants in log *      Drains: * No LDAs found *    Findings:  Infection Present At Time Of Surgery (PATOS) (choose all levels that have infection present):  No infection present  Other Findings: None    Detailed Description of Procedure:   Patient has a chronic paraesophageal hernia.  Her symptoms were becoming significantly worse and she agreed with surgical repair.    Once patient was intubated an adult gastroscope was gently guided down to her GE junction.  There was no evidence of tumors or ulcerations.  Scope was removed and an orogastric tube was passed.  Her abdomen is in prepped and draped in sterile fashion.  Using a direct cutdown technique a 12 mm blunt port was placed midway between the xiphoid and umbilicus.  CO2 insufflation was begun to a pressure of 15 mmHg.  4 more 5 mm ports were placed across her upper abdomen.  Through the far right port a liver retractor was used to elevate the left lateral segment off the hiatus.  Using 2 bowel graspers and LigaSure device we gently reduced the stomach and herniated fat.  The lesser omentum was opened exposing the right marichuy.  The edge of the hernia sac was incised.  We then dissected the hernia sac sac circumferentially from around the hiatus.  We then pulled it out from the mediastinum, transected, and removed it.  We then carefully freed up the distal end of the esophagus until several centimeters of esophagus were resting within the

## 2024-10-29 NOTE — CONSULTS
[facility] Hospitalist Consult Note    Admitting Date and Time: 10/29/2024  5:54 AM  Admit Dx: Hiatal hernia [K44.9]  S/P repair of paraesophageal hernia [Z98.890, Z87.19]    Subjective:    70 yo F with PMH T2DM, CKD, HTN, neuropathy who is currently admitted after laparoscopic hiatal hernia repair w/ fundoplication. She is doing well currently with NG to LWS and with dilaudid PCA pump. No acute complaints. We discussed her PMH and current meds in detail. All questions answered       ROS: denies fever, chills, cp, sob, n/v, HA unless stated above.      sodium chloride flush  5-40 mL IntraVENous 2 times per day    famotidine  20 mg Oral BID    Or    famotidine (PEPCID) injection  20 mg IntraVENous BID    enoxaparin  40 mg SubCUTAneous Daily    metoclopramide  10 mg IntraVENous Q6H     sodium chloride flush, 5-40 mL, PRN  sodium chloride, , PRN  ondansetron, 4 mg, Q8H PRN   Or  ondansetron, 4 mg, Q6H PRN  naloxone 0.4 mg in 10 mL sodium chloride syringe, , PRN  ketorolac, 15 mg, Q6H PRN  magnesium sulfate, 2,000 mg, PRN  potassium chloride, 10 mEq, PRN  acetaminophen, 650 mg, Q4H PRN  bisacodyl, 10 mg, Daily PRN  diphenhydrAMINE, 25 mg, Q6H PRN   Or  diphenhydrAMINE, 25 mg, Q6H PRN         Objective:    BP (!) 161/72   Pulse 68   Temp 97.6 °F (36.4 °C) (Oral)   Resp 16   Ht 1.715 m (5' 7.5\")   Wt 99.8 kg (220 lb)   LMP  (LMP Unknown)   SpO2 98%   BMI 33.95 kg/m²     General Appearance: alert and oriented to person, place and time and in no acute distress  Skin: warm and dry  Head: normocephalic and atraumatic  Eyes: pupils equal, round, and reactive to light, extraocular eye movements intact, conjunctivae normal  Neck: neck supple and non tender without mass   Pulmonary/Chest: clear to auscultation bilaterally- no wheezes, rales or rhonchi, normal air movement, no respiratory distress  Cardiovascular: normal rate, normal S1 and S2 and no carotid bruits  Abdomen: soft, TTP, non-distended, normal bowel  sounds, no masses or organomegaly  Extremities: no cyanosis, no clubbing and no edema  Neurologic: no cranial nerve deficit and speech normal        No results for input(s): \"NA\", \"K\", \"CL\", \"CO2\", \"BUN\", \"CREATININE\", \"GLUCOSE\", \"CALCIUM\" in the last 72 hours.    No results for input(s): \"WBC\", \"RBC\", \"HGB\", \"HCT\", \"MCV\", \"MCH\", \"MCHC\", \"RDW\", \"PLT\", \"MPV\" in the last 72 hours.    Radiology:   XR CHEST PORTABLE   Final Result   NG tube just terminates in the stomach.  Recommend advancement.             Assessment/Plan:    Hiatal hernia s/p laparoscopic repair w/ fundoplication  -Post op AC and pain management per primary surgeon  -Advancement of diet/removal of NG per primary surgeon    T2DM  -SSI while inpatient    HTN  -Resume home meds once cleared for PO intake    HLD  -Resume home meds once cleared for PO intake    Bilateral LE neuropathy  -Resume home meds once cleared for PO intake          Electronically signed by César Kendrick MD on 10/29/2024 at 7:11 PM

## 2024-10-29 NOTE — ACP (ADVANCE CARE PLANNING)
Advance Care Planning   Healthcare Decision Maker:    Primary Decision Maker: Rl Castro - Spouse - [phone]    Secondary Decision Maker: Raymond Castro - Child - [phone]    Click here to complete Healthcare Decision Makers including selection of the Healthcare Decision Maker Relationship (ie \"Primary\").  Today we documented Decision Maker(s) consistent with Legal Next of Kin hierarchy.

## 2024-10-29 NOTE — H&P
Update History & Physical    I examined the patient and reviewed the History and Physical and there were no significant changes.    Vitals:    10/29/24 0600   BP: (!) 145/82   Pulse: 70   Resp: 18   Temp: 96.8 °F (36 °C)   SpO2: 96%     Principal Problem:    Hiatal hernia  Resolved Problems:    * No resolved hospital problems. *        Plan: The risk, benefits, expected outcome, and alternative to the recommended procedure have been discussed with the patient.  Patient understands and wants to proceed with the procedure.    Electronically signed by BIBI PRITCHETT MD on 10/29/24 at 7:27 AM EDT

## 2024-10-29 NOTE — CARE COORDINATION
Case Management Assessment  Initial Evaluation    Date/Time of Evaluation: 10/29/2024 2:06 PM  Assessment Completed by: Zohra Martines    If patient is discharged prior to next notation, then this note serves as note for discharge by case management.    Patient Name: Julia Castro                   YOB: 1955  Diagnosis: Hiatal hernia [K44.9]  S/P repair of paraesophageal hernia [Z98.890, Z87.19]                   Date / Time: 10/29/2024  5:54 AM    Patient Admission Status: Inpatient   Readmission Risk (Low < 19, Mod (19-27), High > 27): Readmission Risk Score: 6.8    Current PCP: Bi Solorio MD  PCP verified by CM? Yes    Chart Reviewed: Yes      History Provided by: Patient  Patient Orientation: Alert and Oriented, Person, Place, Situation    Patient Cognition: Alert    Hospitalization in the last 30 days (Readmission):  No    If yes, Readmission Assessment in CM Navigator will be completed.    Advance Directives:      Code Status: Full Code   Patient's Primary Decision Maker is: Named in Scanned ACP Document    Primary Decision Maker: Claudia Castro - Spouse - [phone]    Secondary Decision Maker: Raymond Castro - Child - [phone]    Discharge Planning:    Patient lives with: Spouse/Significant Other Type of Home: House  Primary Care Giver: Self  Patient Support Systems include: Spouse/Significant Other   Current Financial resources:    Current community resources:    Current services prior to admission: None            Current DME:              Type of Home Care services:  None    ADLS  Prior functional level: Independent in ADLs/IADLs  Current functional level: Independent in ADLs/IADLs    PT AM-PAC:   /24  OT AM-PAC:   /24    Family can provide assistance at DC: Yes  Would you like Case Management to discuss the discharge plan with any other family members/significant others, and if so, who? Yes ( CLAUDIA AT BEDSIDE)  Plans to Return to Present Housing: Yes  Other

## 2024-10-30 VITALS
OXYGEN SATURATION: 95 % | HEIGHT: 68 IN | SYSTOLIC BLOOD PRESSURE: 130 MMHG | BODY MASS INDEX: 33.34 KG/M2 | WEIGHT: 220 LBS | DIASTOLIC BLOOD PRESSURE: 68 MMHG | TEMPERATURE: 98.2 F | RESPIRATION RATE: 18 BRPM | HEART RATE: 69 BPM

## 2024-10-30 LAB
ANION GAP SERPL CALCULATED.3IONS-SCNC: 8 MEQ/L (ref 9–15)
BUN SERPL-MCNC: 16 MG/DL (ref 8–23)
CALCIUM SERPL-MCNC: 8.6 MG/DL (ref 8.5–9.9)
CHLORIDE SERPL-SCNC: 106 MEQ/L (ref 95–107)
CO2 SERPL-SCNC: 26 MEQ/L (ref 20–31)
CREAT SERPL-MCNC: 1.23 MG/DL (ref 0.5–0.9)
GLUCOSE SERPL-MCNC: 185 MG/DL (ref 70–99)
POTASSIUM SERPL-SCNC: 3.8 MEQ/L (ref 3.4–4.9)
SODIUM SERPL-SCNC: 140 MEQ/L (ref 135–144)

## 2024-10-30 PROCEDURE — 2700000000 HC OXYGEN THERAPY PER DAY

## 2024-10-30 PROCEDURE — 96366 THER/PROPH/DIAG IV INF ADDON: CPT

## 2024-10-30 PROCEDURE — 6370000000 HC RX 637 (ALT 250 FOR IP): Performed by: THORACIC SURGERY (CARDIOTHORACIC VASCULAR SURGERY)

## 2024-10-30 PROCEDURE — 80048 BASIC METABOLIC PNL TOTAL CA: CPT

## 2024-10-30 PROCEDURE — 36415 COLL VENOUS BLD VENIPUNCTURE: CPT

## 2024-10-30 PROCEDURE — 96375 TX/PRO/DX INJ NEW DRUG ADDON: CPT

## 2024-10-30 PROCEDURE — G0378 HOSPITAL OBSERVATION PER HR: HCPCS

## 2024-10-30 PROCEDURE — 96376 TX/PRO/DX INJ SAME DRUG ADON: CPT

## 2024-10-30 PROCEDURE — 96365 THER/PROPH/DIAG IV INF INIT: CPT

## 2024-10-30 PROCEDURE — 6360000002 HC RX W HCPCS: Performed by: THORACIC SURGERY (CARDIOTHORACIC VASCULAR SURGERY)

## 2024-10-30 PROCEDURE — 6370000000 HC RX 637 (ALT 250 FOR IP): Performed by: STUDENT IN AN ORGANIZED HEALTH CARE EDUCATION/TRAINING PROGRAM

## 2024-10-30 RX ORDER — INSULIN LISPRO 100 [IU]/ML
0-4 INJECTION, SOLUTION INTRAVENOUS; SUBCUTANEOUS
Status: DISCONTINUED | OUTPATIENT
Start: 2024-10-30 | End: 2024-10-30 | Stop reason: HOSPADM

## 2024-10-30 RX ORDER — DEXTROSE MONOHYDRATE 100 MG/ML
INJECTION, SOLUTION INTRAVENOUS CONTINUOUS PRN
Status: DISCONTINUED | OUTPATIENT
Start: 2024-10-30 | End: 2024-10-30 | Stop reason: HOSPADM

## 2024-10-30 RX ORDER — INSULIN LISPRO 100 [IU]/ML
0-4 INJECTION, SOLUTION INTRAVENOUS; SUBCUTANEOUS EVERY 6 HOURS SCHEDULED
Status: DISCONTINUED | OUTPATIENT
Start: 2024-10-30 | End: 2024-10-30

## 2024-10-30 RX ORDER — GLUCAGON 1 MG/ML
1 KIT INJECTION PRN
Status: DISCONTINUED | OUTPATIENT
Start: 2024-10-30 | End: 2024-10-30 | Stop reason: HOSPADM

## 2024-10-30 RX ORDER — PREGABALIN 50 MG/1
50 CAPSULE ORAL 3 TIMES DAILY
Status: DISCONTINUED | OUTPATIENT
Start: 2024-10-30 | End: 2024-10-30 | Stop reason: HOSPADM

## 2024-10-30 RX ORDER — OXYCODONE HYDROCHLORIDE 5 MG/1
5 TABLET ORAL EVERY 6 HOURS PRN
Qty: 25 TABLET | Refills: 0 | Status: SHIPPED | OUTPATIENT
Start: 2024-10-30 | End: 2024-11-06

## 2024-10-30 RX ORDER — TAMSULOSIN HYDROCHLORIDE 0.4 MG/1
0.4 CAPSULE ORAL DAILY
Status: DISCONTINUED | OUTPATIENT
Start: 2024-10-30 | End: 2024-10-30 | Stop reason: HOSPADM

## 2024-10-30 RX ORDER — ATORVASTATIN CALCIUM 10 MG/1
10 TABLET, FILM COATED ORAL DAILY
Status: DISCONTINUED | OUTPATIENT
Start: 2024-10-30 | End: 2024-10-30 | Stop reason: HOSPADM

## 2024-10-30 RX ORDER — OXYCODONE HYDROCHLORIDE 5 MG/1
5 TABLET ORAL EVERY 4 HOURS PRN
Status: DISCONTINUED | OUTPATIENT
Start: 2024-10-30 | End: 2024-10-30 | Stop reason: HOSPADM

## 2024-10-30 RX ORDER — AMLODIPINE BESYLATE 5 MG/1
5 TABLET ORAL DAILY
Status: DISCONTINUED | OUTPATIENT
Start: 2024-10-30 | End: 2024-10-30 | Stop reason: HOSPADM

## 2024-10-30 RX ADMIN — TAMSULOSIN HYDROCHLORIDE 0.4 MG: 0.4 CAPSULE ORAL at 08:58

## 2024-10-30 RX ADMIN — METOCLOPRAMIDE HYDROCHLORIDE 10 MG: 5 INJECTION INTRAMUSCULAR; INTRAVENOUS at 04:12

## 2024-10-30 RX ADMIN — ATORVASTATIN CALCIUM 10 MG: 10 TABLET, FILM COATED ORAL at 08:58

## 2024-10-30 RX ADMIN — PREGABALIN 50 MG: 50 CAPSULE ORAL at 08:57

## 2024-10-30 RX ADMIN — FAMOTIDINE 20 MG: 20 TABLET, FILM COATED ORAL at 08:58

## 2024-10-30 RX ADMIN — OXYCODONE 5 MG: 5 TABLET ORAL at 08:58

## 2024-10-30 RX ADMIN — METOCLOPRAMIDE HYDROCHLORIDE 10 MG: 5 INJECTION INTRAMUSCULAR; INTRAVENOUS at 08:59

## 2024-10-30 RX ADMIN — AMLODIPINE BESYLATE 5 MG: 5 TABLET ORAL at 08:58

## 2024-10-30 ASSESSMENT — PAIN SCALES - GENERAL
PAINLEVEL_OUTOF10: 7
PAINLEVEL_OUTOF10: 4

## 2024-10-30 NOTE — DISCHARGE SUMMARY
Physician Discharge Summary     Patient ID:  Julia Castro  [id number]  69 y.o.  1955    Admit date: 10/29/2024    Discharge date and time: No discharge date for patient encounter. 10/30/2024    Admitting Physician: Baljit Callejas MD     Discharge Physician: Same    Admission Diagnoses: Hiatal hernia [K44.9]  S/P repair of paraesophageal hernia [Z98.890, Z87.19]    Discharge Diagnoses: Same    Admission Condition: good    Discharged Condition: good    Indication for Admission: Surgery    Hospital Course: Patient was admitted day of surgery for repair of a very symptomatic paraesophageal hernia.  The surgery went well.  By postoperative day 1 she was tolerating a soft diet and discharged to home.    Consults: Hospitalist    Significant Diagnostic Studies: None    Treatments: surgery: Laparoscopic repair of paraesophageal hernia with fundoplication    Discharge Exam:  /68   Pulse 69   Temp 98.2 °F (36.8 °C) (Oral)   Resp 18   Ht 1.715 m (5' 7.5\")   Wt 99.8 kg (220 lb)   LMP  (LMP Unknown)   SpO2 95%   BMI 33.95 kg/m²     General Appearance:    Alert, cooperative, no distress, appears stated age   Head:    Normocephalic, without obvious abnormality, atraumatic   Eyes:    PERRL, conjunctiva/corneas clear, EOM's intact, fundi     benign, both eyes        Ears:    Normal TM's and external ear canals, both ears   Nose:   Nares normal, septum midline, mucosa normal, no drainage    or sinus tenderness   Throat:   Lips, mucosa, and tongue normal; teeth and gums normal   Neck:   Supple, symmetrical, trachea midline, no adenopathy;        thyroid:  No enlargement/tenderness/nodules; no carotid    bruit or JVD   Back:     Symmetric, no curvature, ROM normal, no CVA tenderness   Lungs:     Clear to auscultation bilaterally, respirations unlabored   Chest wall:    No tenderness or deformity   Heart:    Regular rate and rhythm, S1 and S2 normal, no murmur, rub   or gallop   Abdomen:     Soft, non-tender, bowel  these medications       CALCIUM PO Comments:   Reason for Stopping:         loperamide (IMODIUM) 2 MG capsule Comments:   Reason for Stopping:         Alcohol Swabs PADS Comments:   Reason for Stopping:             Activity: no heavy lifting for 6 weeks  Diet:  Soft food and noncarbonated drinks for 2 weeks  Wound Care: as directed    Follow-up with Dr. Callejas in 3 weeks.    Signed:  Dr. Callejas  10/30/2024  8:22 AM

## 2024-10-30 NOTE — DISCHARGE INSTRUCTIONS
Soft diet and noncarbonated drinks for 2 weeks then advance to regular food.  No lifting more than 15 pounds for 6 weeks.  Avoid constipation.  May remove surgical dressings after discharge and shower.  Recover incisions with Band-Aids daily until healed.  No driving while taking narcotics.

## 2024-10-30 NOTE — PROGRESS NOTES
Pt's NG @ 47cm to LIWS, dark brown color, the last CXR from 10am recommended advancing the NG, no further notes about advancing it.     2111: Dr Baljit Callejas MD, notified via [employer], ,and CXR requested, no further orders.

## 2024-10-30 NOTE — PLAN OF CARE
Problem: Chronic Conditions and Co-morbidities  Goal: Patient's chronic conditions and co-morbidity symptoms are monitored and maintained or improved  10/30/2024 1050 by Beverley Smith RN  Outcome: Progressing  10/29/2024 2312 by Elzbieta Leiva RN  Outcome: Progressing     Problem: Discharge Planning  Goal: Discharge to home or other facility with appropriate resources  10/30/2024 1050 by Beverley Smith RN  Outcome: Progressing  10/29/2024 2312 by Elzbieta Leiva RN  Outcome: Progressing     Problem: Pain  Goal: Verbalizes/displays adequate comfort level or baseline comfort level  10/30/2024 1050 by Beverley Smith RN  Outcome: Progressing  10/29/2024 2312 by Elzbieta Leiva RN  Outcome: Progressing     Problem: Safety - Adult  Goal: Free from fall injury  10/30/2024 1050 by Beverley Smith RN  Outcome: Progressing  10/29/2024 2312 by Elzbieta Leiva RN  Outcome: Progressing     Problem: ABCDS Injury Assessment  Goal: Absence of physical injury  10/30/2024 1050 by Beverley Smith RN  Outcome: Progressing  10/29/2024 2312 by Elzbieta Leiva RN  Outcome: Progressing

## 2024-10-30 NOTE — PROGRESS NOTES
Patient tolerating liquids.  Will advance to soft diet for lunch.  If she is able to tolerate lunch and that she can be discharged home this afternoon.  Discharge instructions were given to the patient.

## 2024-10-30 NOTE — CARE COORDINATION
MET WITH PATIENT AT BEDSIDE TO DISCUSS DISCHARGE PLAN. PATIENT DENIES HOME GOING NEEDS. DISCHARGE PLAN REMAINS HOME NO NEEDS.

## 2024-10-30 NOTE — PLAN OF CARE
Problem: Chronic Conditions and Co-morbidities  Goal: Patient's chronic conditions and co-morbidity symptoms are monitored and maintained or improved  10/29/2024 2312 by Elzbieta Leiva RN  Outcome: Progressing  10/29/2024 1219 by Myriam Kirkland RN  Outcome: Progressing     Problem: Discharge Planning  Goal: Discharge to home or other facility with appropriate resources  10/29/2024 2312 by Elzbieta Leiva RN  Outcome: Progressing  10/29/2024 1219 by Myriam Kirkland RN  Outcome: Progressing     Problem: Pain  Goal: Verbalizes/displays adequate comfort level or baseline comfort level  10/29/2024 2312 by Elzbieta Leiva RN  Outcome: Progressing  10/29/2024 1219 by Myriam Kirkland RN  Outcome: Progressing     Problem: Safety - Adult  Goal: Free from fall injury  10/29/2024 2312 by Elzbieta Leiva RN  Outcome: Progressing  10/29/2024 1219 by Myriam Kirkland RN  Outcome: Progressing     Problem: ABCDS Injury Assessment  Goal: Absence of physical injury  10/29/2024 2312 by Elzbieta Leiva RN  Outcome: Progressing  10/29/2024 1219 by Myriam Kirkland RN  Outcome: Progressing

## 2024-10-31 ENCOUNTER — TELEPHONE (OUTPATIENT)
Dept: FAMILY MEDICINE CLINIC | Age: 69
End: 2024-10-31

## 2024-10-31 NOTE — TELEPHONE ENCOUNTER
Care Transitions Initial Follow Up Call    Outreach made within 2 business days of discharge: Yes    Patient: Julia Castro Patient : 1955   MRN: [id number]  Reason for Admission: Hiatal hernia   Discharge Date: 10/30/24       Spoke with: PT    Discharge department/facility: [facility]    TCM Interactive Patient Contact:  Was patient able to fill all prescriptions: Yes  Was patient instructed to bring all medications to the follow-up visit: Yes  Is patient taking all medications as directed in the discharge summary? Yes  Does patient understand their discharge instructions: Yes  Does patient have questions or concerns that need addressed prior to 7-14 day follow up office visit: no    Additional needs identified to be addressed with provider  No needs identified             Scheduled appointment with PCP within 7-14 days    Follow Up  Future Appointments   Date Time Provider Department Center   2024  9:00 AM Bi Solorio MD [facility]   2024 11:15 AM Baljit Callejas MD [facility]   2025  9:45 AM Brian Lovell PA [facility]   6/10/2025  2:00 PM SCHEDULE, [facility] NURSE [facility], [place]

## 2024-11-04 ENCOUNTER — OFFICE VISIT (OUTPATIENT)
Age: 69
End: 2024-11-04

## 2024-11-04 VITALS
SYSTOLIC BLOOD PRESSURE: 110 MMHG | HEART RATE: 77 BPM | WEIGHT: 215.6 LBS | OXYGEN SATURATION: 99 % | TEMPERATURE: 97.2 F | DIASTOLIC BLOOD PRESSURE: 70 MMHG | BODY MASS INDEX: 33.27 KG/M2

## 2024-11-04 DIAGNOSIS — Z87.19 S/P REPAIR OF PARAESOPHAGEAL HERNIA: ICD-10-CM

## 2024-11-04 DIAGNOSIS — Z98.890 S/P REPAIR OF PARAESOPHAGEAL HERNIA: ICD-10-CM

## 2024-11-04 DIAGNOSIS — Z09 HOSPITAL DISCHARGE FOLLOW-UP: Primary | ICD-10-CM

## 2024-11-04 NOTE — PROGRESS NOTES
Post-Discharge Transitional Care  Follow Up      Julia Castro   YOB: 1955    Date of Office Visit:  11/4/2024  Date of Hospital Admission: 10/29/24  Date of Hospital Discharge: 10/30/24  Risk of hospital readmission (high >=14%. Medium >=10%) :Readmission Risk Score: 8.1      Care management risk score Rising risk (score 2-5) and Complex Care (Scores >=6): No Risk Score On File     Non face to face  following discharge, date last encounter closed (first attempt may have been earlier): 10/31/2024    Call initiated 2 business days of discharge: Yes    ASSESSMENT/PLAN:   Hospital discharge follow-up  -     HI DISCHARGE MEDS RECONCILED W/ CURRENT OUTPATIENT MED LIST  S/P repair of paraesophageal hernia  Feeling well.  Advised continued activity modifications.  Stool softener as needed while taking opioid medication  Medical Decision Making: moderate complexity  Return in 3 months (on 2/4/2025).           Subjective:   HPI:  Follow up of Hospital problems/diagnosis(es): Paraesophageal hernia repair    Inpatient course: Discharge summary reviewed- see chart.    Interval history/Current status: Patient is overall doing well.  She is still having abdominal soreness but is improving the day today.  She is using opiate medication only as needed.  She has not had a bowel movement in the past 2 days denies any pain.  She is primarily using a stool softener today.  She denies any nausea, vomiting and is tolerating a soft diet.  She denies any fever or chills    Patient Active Problem List   Diagnosis    Urethral stricture    Urinary retention    Renal insufficiency syndrome    Renal failure syndrome    Renal cyst    Pancreas cyst    Osteoarthrosis involving lower leg    Essential hypertension    Asthma    Allergic rhinitis    Chest pain, unspecified    Type 2 diabetes mellitus with chronic kidney disease, without long-term current use of insulin (HCC)    Arteriosclerosis of coronary artery    Stage 4

## 2024-11-20 ENCOUNTER — OFFICE VISIT (OUTPATIENT)
Dept: CARDIOTHORACIC SURGERY | Age: 69
End: 2024-11-20

## 2024-11-20 VITALS
BODY MASS INDEX: 33.74 KG/M2 | HEART RATE: 70 BPM | TEMPERATURE: 97.3 F | OXYGEN SATURATION: 99 % | WEIGHT: 215 LBS | HEIGHT: 67 IN

## 2024-11-20 DIAGNOSIS — Z87.19 S/P REPAIR OF PARAESOPHAGEAL HERNIA: Primary | ICD-10-CM

## 2024-11-20 DIAGNOSIS — Z98.890 S/P REPAIR OF PARAESOPHAGEAL HERNIA: Primary | ICD-10-CM

## 2024-11-20 PROCEDURE — 99024 POSTOP FOLLOW-UP VISIT: CPT | Performed by: THORACIC SURGERY (CARDIOTHORACIC VASCULAR SURGERY)

## 2024-11-20 NOTE — PROGRESS NOTES
Patient is 3 weeks out from repair of a very symptomatic paraesophageal hernia with fundoplication.  She is doing wonderfully.  She is eating regular food without any dysphagia or heartburn.  All of her preoperative symptoms have completely resolved.  Her only complaint is that she is having some increased flatulence, but this is common after this procedure.    Abdomen is soft.  Incisions are well-healed.    Excellent healing after repair of a paraesophageal hernia.  She has no dietary restrictions but I have still encouraged her to limit carbonated drinks and eat slowly given her increased flatulence.  She can try over-the-counter anti-gas measures as she sees fit.  She still has heavy lifting restrictions for 3 more weeks but after that no restrictions.  I will see her on an as-needed basis.

## 2024-12-06 RX ORDER — NYSTATIN 100000 U/G
CREAM TOPICAL
Qty: 30 G | Refills: 2 | Status: SHIPPED | OUTPATIENT
Start: 2024-12-06

## 2024-12-06 NOTE — TELEPHONE ENCOUNTER
Comments:     Last Office Visit (last PCP visit):   11/4/2024    Next Visit Date:  Future Appointments   Date Time Provider Department Center   1/30/2025  9:45 AM Brian Lovell PA [facility]   2/4/2025  9:00 AM Bi Solorio MD [facility]   6/10/2025  2:00 PM SCHEDULE, [facility] ONC NURSE [facility] [place] [facility]       **If hasn't been seen in over a year OR hasn't followed up according to last diabetes/ADHD visit, make appointment for patient before sending refill to provider.    Rx requested:  Requested Prescriptions     Pending Prescriptions Disp Refills    nystatin (MYCOSTATIN) [id number] UNIT/GM cream [Pharmacy Med Name: nystatin 100,000 unit/gram topical cream] 30 g 2     Sig: Apply topically 2 times daily.

## 2024-12-16 ENCOUNTER — HOSPITAL ENCOUNTER (OUTPATIENT)
Dept: LAB | Age: 69
Discharge: HOME | End: 2024-12-16
Payer: MEDICARE

## 2024-12-16 LAB
ALBUMIN SERPL-MCNC: 3.9 G/DL (ref 3.5–4.6)
ANION GAP SERPL CALCULATED.3IONS-SCNC: 11 MEQ/L (ref 9–15)
BACTERIA URNS QL MICRO: ABNORMAL /HPF
BILIRUB UR QL STRIP: NEGATIVE
BUN SERPL-MCNC: 17 MG/DL (ref 8–23)
CALCIUM SERPL-MCNC: 9 MG/DL (ref 8.5–9.9)
CHLORIDE SERPL-SCNC: 104 MEQ/L (ref 95–107)
CLARITY UR: ABNORMAL
CO2 SERPL-SCNC: 24 MEQ/L (ref 20–31)
COLOR UR: YELLOW
CREAT SERPL-MCNC: 1.34 MG/DL (ref 0.5–0.9)
CREAT UR-MCNC: 101.8 MG/DL
EPI CELLS #/AREA URNS AUTO: ABNORMAL /HPF (ref 0–5)
GLUCOSE SERPL-MCNC: 157 MG/DL (ref 70–99)
GLUCOSE UR STRIP-MCNC: NEGATIVE MG/DL
HGB UR QL STRIP: ABNORMAL
HYALINE CASTS #/AREA URNS AUTO: ABNORMAL /HPF (ref 0–5)
KETONES UR STRIP-MCNC: NEGATIVE MG/DL
LEUKOCYTE ESTERASE UR QL STRIP: ABNORMAL
NITRITE UR QL STRIP: NEGATIVE
PH UR STRIP: 6 [PH] (ref 5–9)
PHOSPHATE SERPL-MCNC: 2.9 MG/DL (ref 2.3–4.8)
POTASSIUM SERPL-SCNC: 3.7 MEQ/L (ref 3.4–4.9)
PROT UR STRIP-MCNC: 100 MG/DL
PROT UR-MCNC: 117 MG/DL
PROT/CREAT UR-RTO: 1.1 ML/ML
PROT/CREAT UR-RTO: 1.1 ML/ML (ref 0–0.2)
PTH-INTACT SERPL-MCNC: 72.9 PG/ML (ref 15–65)
RBC #/AREA URNS AUTO: ABNORMAL /HPF (ref 0–5)
SODIUM SERPL-SCNC: 139 MEQ/L (ref 135–144)
SP GR UR STRIP: 1.01 (ref 1–1.03)
UROBILINOGEN UR STRIP-ACNC: 0.2 E.U./DL
WBC #/AREA URNS AUTO: >100 /HPF (ref 0–5)
YEAST URNS QL MICRO: PRESENT /HPF

## 2024-12-16 PROCEDURE — 82306 VITAMIN D 25 HYDROXY: CPT

## 2024-12-16 PROCEDURE — 83970 ASSAY OF PARATHORMONE: CPT

## 2024-12-16 PROCEDURE — 36415 COLL VENOUS BLD VENIPUNCTURE: CPT

## 2024-12-16 PROCEDURE — 84156 ASSAY OF PROTEIN URINE: CPT

## 2024-12-16 PROCEDURE — 81001 URINALYSIS AUTO W/SCOPE: CPT

## 2024-12-16 PROCEDURE — 80069 RENAL FUNCTION PANEL: CPT

## 2024-12-17 LAB — VITAMIN D 25-HYDROXY: 48.6 NG/ML (ref 30–100)

## 2025-01-09 LAB — DIABETIC RETINOPATHY: NEGATIVE

## 2025-01-29 ENCOUNTER — OFFICE VISIT (OUTPATIENT)
Dept: FAMILY MEDICINE CLINIC | Age: 70
End: 2025-01-29

## 2025-01-29 VITALS
OXYGEN SATURATION: 96 % | SYSTOLIC BLOOD PRESSURE: 124 MMHG | HEART RATE: 87 BPM | TEMPERATURE: 98.2 F | DIASTOLIC BLOOD PRESSURE: 86 MMHG

## 2025-01-29 DIAGNOSIS — J01.10 ACUTE NON-RECURRENT FRONTAL SINUSITIS: ICD-10-CM

## 2025-01-29 DIAGNOSIS — R51.9 ACUTE INTRACTABLE HEADACHE, UNSPECIFIED HEADACHE TYPE: Primary | ICD-10-CM

## 2025-01-29 LAB
INFLUENZA A ANTIBODY: NORMAL
INFLUENZA B ANTIBODY: NORMAL

## 2025-01-29 RX ORDER — CEFDINIR 300 MG/1
300 CAPSULE ORAL 2 TIMES DAILY
Qty: 20 CAPSULE | Refills: 0 | Status: SHIPPED | OUTPATIENT
Start: 2025-01-29 | End: 2025-02-08

## 2025-01-29 ASSESSMENT — PATIENT HEALTH QUESTIONNAIRE - PHQ9
6. FEELING BAD ABOUT YOURSELF - OR THAT YOU ARE A FAILURE OR HAVE LET YOURSELF OR YOUR FAMILY DOWN: NOT AT ALL
4. FEELING TIRED OR HAVING LITTLE ENERGY: NOT AT ALL
2. FEELING DOWN, DEPRESSED OR HOPELESS: NOT AT ALL
7. TROUBLE CONCENTRATING ON THINGS, SUCH AS READING THE NEWSPAPER OR WATCHING TELEVISION: NOT AT ALL
10. IF YOU CHECKED OFF ANY PROBLEMS, HOW DIFFICULT HAVE THESE PROBLEMS MADE IT FOR YOU TO DO YOUR WORK, TAKE CARE OF THINGS AT HOME, OR GET ALONG WITH OTHER PEOPLE: NOT DIFFICULT AT ALL
1. LITTLE INTEREST OR PLEASURE IN DOING THINGS: NOT AT ALL
3. TROUBLE FALLING OR STAYING ASLEEP: NOT AT ALL
SUM OF ALL RESPONSES TO PHQ QUESTIONS 1-9: 0
SUM OF ALL RESPONSES TO PHQ QUESTIONS 1-9: 0
5. POOR APPETITE OR OVEREATING: NOT AT ALL
9. THOUGHTS THAT YOU WOULD BE BETTER OFF DEAD, OR OF HURTING YOURSELF: NOT AT ALL
SUM OF ALL RESPONSES TO PHQ9 QUESTIONS 1 & 2: 0
SUM OF ALL RESPONSES TO PHQ QUESTIONS 1-9: 0
8. MOVING OR SPEAKING SO SLOWLY THAT OTHER PEOPLE COULD HAVE NOTICED. OR THE OPPOSITE, BEING SO FIGETY OR RESTLESS THAT YOU HAVE BEEN MOVING AROUND A LOT MORE THAN USUAL: NOT AT ALL
SUM OF ALL RESPONSES TO PHQ QUESTIONS 1-9: 0

## 2025-01-29 ASSESSMENT — ENCOUNTER SYMPTOMS
COUGH: 1
DIARRHEA: 0
NAUSEA: 0
EYE REDNESS: 0
COLOR CHANGE: 0
EYE DISCHARGE: 0
VOMITING: 0
CHEST TIGHTNESS: 0
SHORTNESS OF BREATH: 0
WHEEZING: 0

## 2025-01-29 NOTE — PROGRESS NOTES
Julia Castro (: 1955) is a 69 y.o. female, Established patient, who presents today for:    Chief Complaint   Patient presents with    Congestion     Head congestion, started , headaches          Subjective     HPI:  HPI     4 days ago started with sinus congestion and headache, PND  Denies fever, ST  Covid negative at home Monday  Tried Coricidin and OTC cough syrup without relief      Review of Systems   Constitutional:  Positive for fatigue. Negative for chills, fever and unexpected weight change.   HENT:  Positive for congestion. Negative for ear pain.    Eyes:  Negative for discharge, redness and visual disturbance.   Respiratory:  Positive for cough. Negative for chest tightness, shortness of breath and wheezing.    Cardiovascular:  Negative for chest pain.   Gastrointestinal:  Negative for diarrhea, nausea and vomiting.   Genitourinary:  Negative for dysuria and flank pain.   Musculoskeletal:  Negative for myalgias.   Skin:  Negative for color change and rash.   Allergic/Immunologic: Negative for environmental allergies.   Neurological:  Positive for weakness and headaches.   Hematological:  Negative for adenopathy.   Psychiatric/Behavioral:  Negative for sleep disturbance. The patient is not nervous/anxious.         Past Medical History:   Diagnosis Date    Allergic rhinitis     Chest pain, unspecified 2018    Endometrial cancer, grade I ([facility])     [facility]    Hypertension     Kidney problem     blockage in urethra and urine backs up has to get urethra dilated, has scar tissue    Type 2 diabetes mellitus with chronic kidney disease, without long-term current use of insulin ([facility]) 2019      Social History     Socioeconomic History    Marital status:      Spouse name: Not on file    Number of children: Not on file    Years of education: Not on file    Highest education level: Not on file   Occupational History    Not on file   Tobacco Use    Smoking

## 2025-01-30 ENCOUNTER — OFFICE VISIT (OUTPATIENT)
Dept: UROLOGY | Age: 70
End: 2025-01-30
Payer: COMMERCIAL

## 2025-01-30 VITALS
HEART RATE: 79 BPM | SYSTOLIC BLOOD PRESSURE: 124 MMHG | DIASTOLIC BLOOD PRESSURE: 84 MMHG | BODY MASS INDEX: 32.58 KG/M2 | WEIGHT: 208 LBS

## 2025-01-30 DIAGNOSIS — N31.9 NEUROGENIC BLADDER: ICD-10-CM

## 2025-01-30 DIAGNOSIS — R33.9 URINARY RETENTION: ICD-10-CM

## 2025-01-30 DIAGNOSIS — N32.0 BLADDER OUTLET OBSTRUCTION: ICD-10-CM

## 2025-01-30 PROCEDURE — 3079F DIAST BP 80-89 MM HG: CPT | Performed by: PHYSICIAN ASSISTANT

## 2025-01-30 PROCEDURE — 1159F MED LIST DOCD IN RCRD: CPT | Performed by: PHYSICIAN ASSISTANT

## 2025-01-30 PROCEDURE — 3074F SYST BP LT 130 MM HG: CPT | Performed by: PHYSICIAN ASSISTANT

## 2025-01-30 PROCEDURE — 1126F AMNT PAIN NOTED NONE PRSNT: CPT | Performed by: PHYSICIAN ASSISTANT

## 2025-01-30 PROCEDURE — 1123F ACP DISCUSS/DSCN MKR DOCD: CPT | Performed by: PHYSICIAN ASSISTANT

## 2025-01-30 PROCEDURE — 99213 OFFICE O/P EST LOW 20 MIN: CPT | Performed by: PHYSICIAN ASSISTANT

## 2025-01-30 RX ORDER — PREGABALIN 50 MG/1
1 CAPSULE ORAL 3 TIMES DAILY
COMMUNITY
Start: 2024-12-10

## 2025-01-30 RX ORDER — OXYCODONE HYDROCHLORIDE 5 MG/1
TABLET ORAL
COMMUNITY
Start: 2024-10-30

## 2025-01-30 RX ORDER — PREDNISOLONE ACETATE 10 MG/ML
SUSPENSION/ DROPS OPHTHALMIC
COMMUNITY
Start: 2024-12-27

## 2025-01-30 RX ORDER — TRIAMCINOLONE ACETONIDE 1 MG/G
CREAM TOPICAL
COMMUNITY
Start: 2024-11-16

## 2025-01-30 RX ORDER — TAMSULOSIN HYDROCHLORIDE 0.4 MG/1
0.4 CAPSULE ORAL DAILY
Qty: 90 CAPSULE | Refills: 3 | Status: SHIPPED | OUTPATIENT
Start: 2025-01-30

## 2025-01-30 ASSESSMENT — ENCOUNTER SYMPTOMS: APNEA: 0

## 2025-01-30 NOTE — PROGRESS NOTES
Subjective:      Patient ID: Julia Castro is a 69 y.o. female    HPI69 year old female who presents with a known history of having a neurogenic bladder with a bladder outlet obstruction. She has been self catheterizing up to 7 times per day. She admits to occasionally having difficulty inserting the catheter and it can be tender at times. She does void occasionally and takes flomax to help. Denies gross hematuria and dysuria       Past Medical History:   Diagnosis Date    Allergic rhinitis     Chest pain, unspecified 08/20/2018    Endometrial cancer, grade I (HCC) 2011    [facility]    Hypertension     Kidney problem     blockage in urethra and urine backs up has to get urethra dilated, has scar tissue    Type 2 diabetes mellitus with chronic kidney disease, without long-term current use of insulin (HCC) 06/21/2019     Past Surgical History:   Procedure Laterality Date    CATARACT REMOVAL Left 11/2020    CHOLECYSTECTOMY  1990    COLONOSCOPY N/A 8/10/2022    COLORECTAL CANCER SCREENING, NOT HIGH RISK performed by Angela Skaggs MD at [facility] OR    GASTRIC FUNDOPLICATION N/A 10/29/2024    laparoscopic hiatal hernia repair with fundoplication WITH GASTROPEXY performed [facility] Baljit Callejas [facility]    HYSTERECTOMY (CERVIX STATUS UNKNOWN)  2011    endometrial cancer stage !a    URETHRAL STRICTURE DILATATION  2016    Dr. Klein      Social History     Socioeconomic History    Marital status:    Tobacco Use    Smoking status: Never    Smokeless tobacco: Never   Substance and Sexual Activity    Alcohol use: No    Drug use: No     Social Determinants of Health     Financial Resource Strain: Low Risk  (8/9/2024)    Overall Financial Resource Strain (CARDIA)     Difficulty of Paying Living Expenses: Not hard at all   Food Insecurity: No Food Insecurity (10/29/2024)    Hunger Vital Sign     Worried About Running Out of Food in the Last Year: Never true     Ran Out of Food in the Last Year: Never true

## 2025-02-04 ENCOUNTER — OFFICE VISIT (OUTPATIENT)
Age: 70
End: 2025-02-04
Payer: COMMERCIAL

## 2025-02-04 VITALS
SYSTOLIC BLOOD PRESSURE: 120 MMHG | DIASTOLIC BLOOD PRESSURE: 70 MMHG | BODY MASS INDEX: 32.11 KG/M2 | TEMPERATURE: 97.3 F | WEIGHT: 205 LBS | OXYGEN SATURATION: 98 % | HEART RATE: 99 BPM

## 2025-02-04 DIAGNOSIS — G62.9 NEUROPATHY: ICD-10-CM

## 2025-02-04 DIAGNOSIS — J06.9 UPPER RESPIRATORY TRACT INFECTION, UNSPECIFIED TYPE: Primary | ICD-10-CM

## 2025-02-04 DIAGNOSIS — M54.16 LUMBAR RADICULOPATHY: ICD-10-CM

## 2025-02-04 PROCEDURE — 1159F MED LIST DOCD IN RCRD: CPT | Performed by: FAMILY MEDICINE

## 2025-02-04 PROCEDURE — 99214 OFFICE O/P EST MOD 30 MIN: CPT | Performed by: FAMILY MEDICINE

## 2025-02-04 PROCEDURE — 1123F ACP DISCUSS/DSCN MKR DOCD: CPT | Performed by: FAMILY MEDICINE

## 2025-02-04 PROCEDURE — 1160F RVW MEDS BY RX/DR IN RCRD: CPT | Performed by: FAMILY MEDICINE

## 2025-02-04 PROCEDURE — 3074F SYST BP LT 130 MM HG: CPT | Performed by: FAMILY MEDICINE

## 2025-02-04 PROCEDURE — 3078F DIAST BP <80 MM HG: CPT | Performed by: FAMILY MEDICINE

## 2025-02-04 RX ORDER — AZITHROMYCIN 250 MG/1
TABLET, FILM COATED ORAL
Qty: 6 TABLET | Refills: 0 | Status: SHIPPED | OUTPATIENT
Start: 2025-02-04

## 2025-02-04 RX ORDER — PREGABALIN 50 MG/1
50 CAPSULE ORAL 3 TIMES DAILY
Qty: 90 CAPSULE | Refills: 2 | Status: SHIPPED | OUTPATIENT
Start: 2025-02-04 | End: 2025-05-05

## 2025-02-04 ASSESSMENT — ENCOUNTER SYMPTOMS
APNEA: 0
ABDOMINAL PAIN: 0
DIARRHEA: 0
CONSTIPATION: 0
BLOOD IN STOOL: 0
NAUSEA: 0
CHEST TIGHTNESS: 0
VOMITING: 0
SHORTNESS OF BREATH: 0
COUGH: 0

## 2025-02-04 NOTE — PROGRESS NOTES
Subjective:      Patient ID: Julia Castro is a 69 y.o. female who presents today for:  History of Present Illness  The patient presents for evaluation of cough and medication management.    She was initially evaluated at a walk-in clinic last Sunday, where she was prescribed antibiotics. The antibiotic treatment effectively alleviated her rhinorrhea, but she continues to experience nasal congestion and a persistent cough, particularly at night. She reports no febrile episodes. Initially, her nasal discharge was clear, but it has recently developed a yellowish tinge. The onset of her cough and congestion coincided with the start of her rhinorrhea. She does not produce any sputum when coughing. She experienced a period of weakness lasting 3 to 4 days, during which she found it challenging to ascend stairs due to dyspnea. However, her strength has since improved. She also reported headaches, which have now resolved. Her symptoms are primarily localized to her throat. Despite some improvement in her condition, her cough remains unresolved. She is currently on the seventh day of her antibiotic course.    Her Lyrica was supposed to be refilled in November, but the drug store said that the provider did not have the refill. Her GYN oncologist gave her a 30-day refill in December, but she has not had any since then and now her toes and legs are hurting her. She had surgery at the end of October and saw Dr. Callejas 6 weeks after that and he told her she would not have to see him again.    MEDICATIONS  Current: Omnicef  Past: Lyrica    Chief Complaint   Patient presents with    3 Month Follow-Up     Seen walk in clinic 1/29 started abx, states she is still coughing              Results      Past Medical History:   Diagnosis Date    Allergic rhinitis     Chest pain, unspecified 08/20/2018    Endometrial cancer, grade I (HCC) 2011    [facility]    Hypertension     Kidney problem     blockage in urethra and urine backs

## 2025-02-06 ENCOUNTER — TELEPHONE (OUTPATIENT)
Age: 70
End: 2025-02-06

## 2025-02-06 NOTE — TELEPHONE ENCOUNTER
Wife states that she feels better. And mornings are worse and symptoms have improved throughout the day

## 2025-02-06 NOTE — TELEPHONE ENCOUNTER
Pt called bc her symptoms have come back,. She is still coughing and severely congested. Wanted to f/u w/ PCP. Please advise.

## 2025-02-07 NOTE — TELEPHONE ENCOUNTER
Wife states she feels a bit better today but her  is about the same.    Please advise    Callback [phone]

## 2025-02-19 DIAGNOSIS — N18.31 TYPE 2 DIABETES MELLITUS WITH STAGE 3A CHRONIC KIDNEY DISEASE, WITHOUT LONG-TERM CURRENT USE OF INSULIN (HCC): ICD-10-CM

## 2025-02-19 DIAGNOSIS — E11.22 TYPE 2 DIABETES MELLITUS WITH STAGE 3A CHRONIC KIDNEY DISEASE, WITHOUT LONG-TERM CURRENT USE OF INSULIN (HCC): ICD-10-CM

## 2025-02-19 RX ORDER — GLIPIZIDE 2.5 MG/1
5 TABLET, EXTENDED RELEASE ORAL DAILY
Qty: 180 TABLET | Refills: 2 | Status: SHIPPED | OUTPATIENT
Start: 2025-02-19

## 2025-02-19 NOTE — TELEPHONE ENCOUNTER
Comments:     Last Office Visit (last PCP visit):   2/4/2025    Next Visit Date:  Future Appointments   Date Time Provider Department Center   6/10/2025  2:00 PM SCHEDULE, [facility] ONC NURSE [facility]   1/30/2026  9:30 AM Brian Lovell PA [facility]       **If hasn't been seen in over a year OR hasn't followed up according to last diabetes/ADHD visit, make appointment for patient before sending refill to provider.    Rx requested:  Requested Prescriptions     Pending Prescriptions Disp Refills    glipiZIDE (GLUCOTROL XL) 2.5 MG extended release tablet [Pharmacy Med Name: glipizide ER 2.5 mg tablet, extended release 24 hr] 180 tablet 2     Sig: Take 2 tablets by mouth daily

## 2025-02-25 RX ORDER — NYSTATIN 100000 U/G
CREAM TOPICAL
Qty: 30 G | Refills: 2 | Status: SHIPPED | OUTPATIENT
Start: 2025-02-25

## 2025-02-25 NOTE — TELEPHONE ENCOUNTER
Comments:     Last Office Visit (last PCP visit):   2/4/2025    Next Visit Date:  Future Appointments   Date Time Provider Department Center   6/10/2025  2:00 PM SCHEDULE, SHE[facility] ANGUIANO ONC NURSE [facility]   1/30/2026  9:30 AM Brian Lovell PA [facility]       **If hasn't been seen in over a year OR hasn't followed up according to last diabetes/ADHD visit, make appointment for patient before sending refill to provider.    Rx requested:  Requested Prescriptions     Pending Prescriptions Disp Refills    nystatin (MYCOSTATIN) [id number] UNIT/GM cream [Pharmacy Med Name: nystatin 100,000 unit/gram topical cream] 30 g 2     Sig: Apply topically 2 times daily.

## 2025-03-28 ENCOUNTER — OFFICE VISIT (OUTPATIENT)
Age: 70
End: 2025-03-28
Payer: COMMERCIAL

## 2025-03-28 VITALS
SYSTOLIC BLOOD PRESSURE: 130 MMHG | DIASTOLIC BLOOD PRESSURE: 84 MMHG | OXYGEN SATURATION: 99 % | BODY MASS INDEX: 32.11 KG/M2 | TEMPERATURE: 97.5 F | WEIGHT: 205 LBS | HEART RATE: 68 BPM

## 2025-03-28 DIAGNOSIS — E11.22 TYPE 2 DIABETES MELLITUS WITH STAGE 3A CHRONIC KIDNEY DISEASE, WITHOUT LONG-TERM CURRENT USE OF INSULIN (HCC): Primary | ICD-10-CM

## 2025-03-28 DIAGNOSIS — N18.31 TYPE 2 DIABETES MELLITUS WITH STAGE 3A CHRONIC KIDNEY DISEASE, WITHOUT LONG-TERM CURRENT USE OF INSULIN (HCC): Primary | ICD-10-CM

## 2025-03-28 LAB — HBA1C MFR BLD: 7.7 %

## 2025-03-28 PROCEDURE — 1159F MED LIST DOCD IN RCRD: CPT | Performed by: FAMILY MEDICINE

## 2025-03-28 PROCEDURE — 99213 OFFICE O/P EST LOW 20 MIN: CPT | Performed by: FAMILY MEDICINE

## 2025-03-28 PROCEDURE — 83036 HEMOGLOBIN GLYCOSYLATED A1C: CPT | Performed by: FAMILY MEDICINE

## 2025-03-28 PROCEDURE — 3079F DIAST BP 80-89 MM HG: CPT | Performed by: FAMILY MEDICINE

## 2025-03-28 PROCEDURE — 1126F AMNT PAIN NOTED NONE PRSNT: CPT | Performed by: FAMILY MEDICINE

## 2025-03-28 PROCEDURE — 3075F SYST BP GE 130 - 139MM HG: CPT | Performed by: FAMILY MEDICINE

## 2025-03-28 PROCEDURE — 1123F ACP DISCUSS/DSCN MKR DOCD: CPT | Performed by: FAMILY MEDICINE

## 2025-03-28 PROCEDURE — 1160F RVW MEDS BY RX/DR IN RCRD: CPT | Performed by: FAMILY MEDICINE

## 2025-03-28 RX ORDER — GLIPIZIDE 5 MG/1
5 TABLET, FILM COATED, EXTENDED RELEASE ORAL DAILY
Qty: 30 TABLET | Refills: 0 | Status: SHIPPED | OUTPATIENT
Start: 2025-03-28

## 2025-03-28 ASSESSMENT — ENCOUNTER SYMPTOMS
ABDOMINAL PAIN: 0
CHEST TIGHTNESS: 0
APNEA: 0
DIARRHEA: 0
BLOOD IN STOOL: 0
CONSTIPATION: 0
NAUSEA: 0
COUGH: 0
VOMITING: 0
SHORTNESS OF BREATH: 0

## 2025-03-28 NOTE — PROGRESS NOTES
5    Lancets (ONETOUCH DELICA PLUS PRIKHR87L) MISC USE 1  TO CHECK GLUCOSE THREE TIMES DAILY 100 each 5    acetaminophen (TYLENOL) 500 MG tablet Take 2 tablets by mouth every 6 hours as needed for Pain      ibuprofen (ADVIL;MOTRIN) 200 MG tablet Take 1 tablet by mouth every 6 hours as needed for Pain      Handicap Placard MISC by Does not apply route Expires: 7/9/2027 1 each 0    blood glucose monitor kit and supplies DX: diabetes mellitus. Test 3 time(s) daily - Ok to substitute per insurance 1 kit 1    VITAMIN D PO Take by mouth      CRANBERRY PO Take by mouth      aspirin 81 MG tablet Take 1 tablet by mouth daily       No current facility-administered medications on file prior to visit.     :  Ciprofloxacin    Review of Systems   Constitutional:  Negative for activity change, appetite change and fatigue.   Respiratory:  Negative for apnea, cough, chest tightness and shortness of breath.    Cardiovascular:  Negative for chest pain, palpitations and leg swelling.   Gastrointestinal:  Negative for abdominal pain, blood in stool, constipation, diarrhea, nausea and vomiting.   Musculoskeletal:  Negative for arthralgias.   Neurological:  Negative for seizures and headaches.   Psychiatric/Behavioral:  Negative for hallucinations and suicidal ideas.        Objective:   /84   Pulse 68   Temp 97.5 °F (36.4 °C) (Infrared)   Wt 93 kg (205 lb)   LMP  (LMP Unknown)   SpO2 99%   BMI 32.11 kg/m²     Physical Exam  Vitals and nursing note reviewed.   Constitutional:       General: She is not in acute distress.     Appearance: Normal appearance. She is well-developed. She is not diaphoretic.   HENT:      Head: Normocephalic and atraumatic.      Nose: Nose normal.      Mouth/Throat:      Mouth: Mucous membranes are moist.      Pharynx: Oropharynx is clear.   Eyes:      Conjunctiva/sclera: Conjunctivae normal.      Pupils: Pupils are equal, round, and reactive to light.   Cardiovascular:      Rate and Rhythm: Normal rate

## 2025-04-09 ENCOUNTER — TELEPHONE (OUTPATIENT)
Age: 70
End: 2025-04-09

## 2025-04-09 NOTE — TELEPHONE ENCOUNTER
Patient states Dr. Solorio wanted to know if Glipizine 7.5 was helping. She states her blood sugars are still running high. In morning it was 190, after breakfast (cinnamon roll)it was 256.    Please advise    Callback [phone]

## 2025-04-16 DIAGNOSIS — N18.31 TYPE 2 DIABETES MELLITUS WITH STAGE 3A CHRONIC KIDNEY DISEASE, WITHOUT LONG-TERM CURRENT USE OF INSULIN (HCC): ICD-10-CM

## 2025-04-16 DIAGNOSIS — E11.22 TYPE 2 DIABETES MELLITUS WITH STAGE 3A CHRONIC KIDNEY DISEASE, WITHOUT LONG-TERM CURRENT USE OF INSULIN (HCC): ICD-10-CM

## 2025-04-16 NOTE — TELEPHONE ENCOUNTER
Last Office Visit (last PCP visit):   3/28/2025    Next Visit Date:  Future Appointments   Date Time Provider Department Center   5/19/2025 10:30 AM SHAUN CT ROOM 1 MAL  CT MAL Fac RAD   6/26/2025  1:00 PM SCHEDULE, [facility] NURSE [facility]   6/30/2025  9:00 AM Bi Solorio MD [facility] ECC DEP   1/30/2026  9:30 AM Brian Lovell PA [facility]

## 2025-04-17 NOTE — TELEPHONE ENCOUNTER
I will add Januvia to current regimen. Would advise reducing Glipizide back to 5 mg. Please check with nephrology to ensure they are agreeable

## 2025-04-21 DIAGNOSIS — G62.9 NEUROPATHY: ICD-10-CM

## 2025-04-21 DIAGNOSIS — M54.16 LUMBAR RADICULOPATHY: ICD-10-CM

## 2025-04-21 NOTE — TELEPHONE ENCOUNTER
Comments:     Last Office Visit (last PCP visit):   3/28/2025    Next Visit Date:  Future Appointments   Date Time Provider Department Center   5/19/2025 10:30 AM SHAUN CT ROOM 1 MALZ  CT MALZ Fac RAD   6/26/2025  1:00 PM SCHEDULE, [facility] ONC NURSE [facility] ONC [place] [facility]   6/30/2025  9:00 AM Bi Solorio MD [facility] DEP   1/30/2026  9:30 AM Brian Lovell PA [facility]       **If hasn't been seen in over a year OR hasn't followed up according to last diabetes/ADHD visit, make appointment for patient before sending refill to provider.    Rx requested:  Requested Prescriptions     Pending Prescriptions Disp Refills    pregabalin (LYRICA) 50 MG capsule [Pharmacy Med Name: pregabalin 50 mg capsule] 90 capsule 2     Sig: Take 1 capsule by mouth 3 times daily for 90 days. Max Daily Amount: 150 mg

## 2025-04-21 NOTE — TELEPHONE ENCOUNTER
Pt called in stating the januvia would cost too much out of pocked. Pharmacy advised her to see if a generic could be sent instead

## 2025-04-21 NOTE — TELEPHONE ENCOUNTER
Spoke with pharmacy, RX is $298. Asked pharmacy if any alternatives were listed, she stated there were none and PA is not needed for this RX

## 2025-04-22 RX ORDER — PREGABALIN 50 MG/1
50 CAPSULE ORAL 3 TIMES DAILY
Qty: 90 CAPSULE | Refills: 2 | Status: SHIPPED | OUTPATIENT
Start: 2025-04-22 | End: 2025-07-21

## 2025-04-23 ENCOUNTER — TELEPHONE (OUTPATIENT)
Age: 70
End: 2025-04-23

## 2025-04-23 NOTE — TELEPHONE ENCOUNTER
Patient states she called her insurance company and they will cover Metformin, Glipizide, and Glimepiride.    Please advise    Callback [phone]

## 2025-04-25 ENCOUNTER — TELEPHONE (OUTPATIENT)
Age: 70
End: 2025-04-25

## 2025-04-25 DIAGNOSIS — N18.31 TYPE 2 DIABETES MELLITUS WITH STAGE 3A CHRONIC KIDNEY DISEASE, WITHOUT LONG-TERM CURRENT USE OF INSULIN (HCC): ICD-10-CM

## 2025-04-25 DIAGNOSIS — E78.5 HYPERLIPIDEMIA, UNSPECIFIED HYPERLIPIDEMIA TYPE: ICD-10-CM

## 2025-04-25 DIAGNOSIS — E11.22 TYPE 2 DIABETES MELLITUS WITH STAGE 3A CHRONIC KIDNEY DISEASE, WITHOUT LONG-TERM CURRENT USE OF INSULIN (HCC): ICD-10-CM

## 2025-04-25 RX ORDER — ATORVASTATIN CALCIUM 10 MG/1
10 TABLET, FILM COATED ORAL DAILY
Qty: 90 TABLET | Refills: 1 | Status: CANCELLED | OUTPATIENT
Start: 2025-04-25

## 2025-04-25 NOTE — TELEPHONE ENCOUNTER
Patient states the 2.5 in the evening and 5 in daytime is working very well. She does not want her medication changed. She states she does need rf of both. 2.5 is not on her med list.    DDM in [place].

## 2025-04-27 NOTE — TELEPHONE ENCOUNTER
Comments:     Last Office Visit (last PCP visit):   3/28/2025    Next Visit Date:  Future Appointments   Date Time Provider Department Center   5/19/2025 10:30 AM SHAUN CT ROOM 1 MAL  CT MAL Fac RAD   6/26/2025  1:00 PM SCHEDULE, SHELBI ANGUIANO ONC NURSE [facility] ONC [place] [facility]   6/30/2025  9:00 AM Bi Solorio MD [facility] ECC DEP   1/30/2026  9:30 AM Brian Lovell PA [facility]         Rx requested:  Requested Prescriptions     Pending Prescriptions Disp Refills    atorvastatin (LIPITOR) 10 MG tablet 90 tablet 1     Sig: Take 1 tablet by mouth daily Take 1 tablet by mouth once daily    glipiZIDE (GLUCOTROL XL) 2.5 MG extended release tablet 180 tablet 2     Sig: Take 2 tablets by mouth daily

## 2025-04-28 NOTE — TELEPHONE ENCOUNTER
Patient calling again regarding refills for glipizide 2.5 mg, glipizide 5 mg, and atorvastatin 10 mg.    Patient states she has been taking the glipizide 2.5 mg and glipizide 5 mg for 3 weeks now and it is helping to keep her blood sugar down.

## 2025-04-29 RX ORDER — GLIPIZIDE 2.5 MG/1
5 TABLET, EXTENDED RELEASE ORAL DAILY
Qty: 180 TABLET | Refills: 2 | Status: SHIPPED | OUTPATIENT
Start: 2025-04-29

## 2025-04-29 RX ORDER — ATORVASTATIN CALCIUM 10 MG/1
10 TABLET, FILM COATED ORAL DAILY
Qty: 90 TABLET | Refills: 1 | Status: SHIPPED | OUTPATIENT
Start: 2025-04-29

## 2025-04-29 RX ORDER — GLIPIZIDE 5 MG/1
5 TABLET, FILM COATED, EXTENDED RELEASE ORAL DAILY
Qty: 30 TABLET | Refills: 0 | Status: SHIPPED | OUTPATIENT
Start: 2025-04-29

## 2025-04-29 NOTE — TELEPHONE ENCOUNTER
Patient states she only has a couple of glipizide 2.5 mg left and she is out of glipizide 5 mg.    She is asking for Dr. Solorio to send both of these to her pharmacy.    Please Advise.    Callback  [phone]

## 2025-05-19 ENCOUNTER — PATIENT MESSAGE (OUTPATIENT)
Age: 70
End: 2025-05-19

## 2025-05-19 ENCOUNTER — HOSPITAL ENCOUNTER (OUTPATIENT)
Dept: CT IMAGING | Age: 70
Discharge: HOME | End: 2025-05-21
Payer: COMMERCIAL

## 2025-05-19 ENCOUNTER — HOSPITAL ENCOUNTER (OUTPATIENT)
Dept: LAB | Age: 70
Discharge: HOME | End: 2025-05-19
Payer: COMMERCIAL

## 2025-05-19 DIAGNOSIS — N18.31 TYPE 2 DIABETES MELLITUS WITH STAGE 3A CHRONIC KIDNEY DISEASE, WITHOUT LONG-TERM CURRENT USE OF INSULIN (HCC): ICD-10-CM

## 2025-05-19 DIAGNOSIS — E11.22 TYPE 2 DIABETES MELLITUS WITH STAGE 3A CHRONIC KIDNEY DISEASE, WITHOUT LONG-TERM CURRENT USE OF INSULIN (HCC): ICD-10-CM

## 2025-05-19 DIAGNOSIS — C54.1 ENDOMETRIAL ADENOCARCINOMA (HCC): ICD-10-CM

## 2025-05-19 LAB — CREAT SERPL-MCNC: 1.4 MG/DL (ref 0.5–0.9)

## 2025-05-19 PROCEDURE — 82565 ASSAY OF CREATININE: CPT

## 2025-05-19 PROCEDURE — 74177 CT ABD & PELVIS W/CONTRAST: CPT

## 2025-05-19 PROCEDURE — 6360000004 HC RX CONTRAST MEDICATION: Performed by: RADIOLOGY

## 2025-05-19 PROCEDURE — 36415 COLL VENOUS BLD VENIPUNCTURE: CPT

## 2025-05-19 RX ORDER — GLIPIZIDE 5 MG/1
5 TABLET, FILM COATED, EXTENDED RELEASE ORAL DAILY
Qty: 30 TABLET | Refills: 0 | OUTPATIENT
Start: 2025-05-19

## 2025-05-19 RX ORDER — GLIPIZIDE 5 MG/1
5 TABLET, FILM COATED, EXTENDED RELEASE ORAL DAILY
Qty: 90 TABLET | Refills: 0 | Status: SHIPPED | OUTPATIENT
Start: 2025-05-19

## 2025-05-19 RX ORDER — IOPAMIDOL 755 MG/ML
75 INJECTION, SOLUTION INTRAVASCULAR
Status: COMPLETED | OUTPATIENT
Start: 2025-05-19 | End: 2025-05-19

## 2025-05-19 RX ADMIN — IOPAMIDOL 75 ML: 755 INJECTION, SOLUTION INTRAVENOUS at 11:05

## 2025-06-26 ENCOUNTER — HOSPITAL ENCOUNTER (OUTPATIENT)
Dept: RADIATION ONCOLOGY | Age: 70
Discharge: HOME | End: 2025-06-26
Payer: COMMERCIAL

## 2025-06-26 VITALS
DIASTOLIC BLOOD PRESSURE: 70 MMHG | BODY MASS INDEX: 33.05 KG/M2 | WEIGHT: 211 LBS | SYSTOLIC BLOOD PRESSURE: 145 MMHG | HEART RATE: 53 BPM | RESPIRATION RATE: 16 BRPM | TEMPERATURE: 97.5 F | OXYGEN SATURATION: 97 %

## 2025-06-26 DIAGNOSIS — Z87.19 S/P REPAIR OF PARAESOPHAGEAL HERNIA: Primary | ICD-10-CM

## 2025-06-26 DIAGNOSIS — Z98.890 S/P REPAIR OF PARAESOPHAGEAL HERNIA: Primary | ICD-10-CM

## 2025-06-26 DIAGNOSIS — C54.1 ENDOMETRIAL ADENOCARCINOMA (HCC): ICD-10-CM

## 2025-06-26 DIAGNOSIS — K44.9 HIATAL HERNIA: ICD-10-CM

## 2025-06-26 PROCEDURE — 99214 OFFICE O/P EST MOD 30 MIN: CPT | Performed by: RADIOLOGY

## 2025-06-26 PROCEDURE — G2211 COMPLEX E/M VISIT ADD ON: HCPCS | Performed by: RADIOLOGY

## 2025-06-26 PROCEDURE — 99212 OFFICE O/P EST SF 10 MIN: CPT | Performed by: RADIOLOGY

## 2025-06-26 NOTE — PROGRESS NOTES
NURSING ASSESSMENT     Date: 6/26/2025        Patient Name: Julia Castro     YOB: 1955      Age:  69 y.o.      MRN: [id number]       Chaperone [x] Yes   [] No  , Buddy    Advance Directives:   Do you currently have completed advance directives (living will)? [] Yes   [x] No         *If yes, please bring us a copy for your records.  *If no, would you like info or assistance in completing advance directives (living will)?   [] Yes   [x] No    Pain Score:   Pain Score (1-10): Moderate  Pain Location: Chronic low back pain   Pain Duration: Frequent but not daily   Pain Management/Control: Takes Tylenol Arthritis as needed with good relief.      Is pain affecting your ability to take care of yourself or move throughout your home?                        [] Yes   [x] No    General: No Problems, appetite is good  Patient has gained weight [] Yes   [x] No  Patient has lost weight [x] Yes   [] No  How much weight in pounds and over what length of time: Down 8 lbs since hiatal surgery repair on 10/29/24    Eyes (Ophthalmic): No Problem     Skin (Dermatological): Denies any dry patches at present.     ENT: Since the hiatal hernia repair, reports no dysphagia     Respiratory: No Problems     Cardiovascular: No Problems      Device   [] Yes   [x] No   Copy of Card Obtained [] Yes   [x] No    Gastrointestinal: Since having hiatal hernia repaired has 3-4 episodes of  abdominal cramping and diarrhea per week. Takes imodium with temporary relief. Denies any blood.Denies nausea or vomiting. No longer having dysphagia.    Genito-Urinary:    Continues to self cath 4 times throughout the day and 3-4 times at night. Denies vaginal discharge or bleeding.       I       Breast: No Problems     Musculoskeletal: Back Pain at baseline    Neurological: No Problems      Hematological and Lymphatic: No Problems     Endocrine: Diabetes Mellitus    Gyn History:   Denies vaginal discharge or bleeding.   Last Mammogram    A

## 2025-06-26 NOTE — PROGRESS NOTES
RADIATION ONCOLOGY  DIETICIAN REFERRAL  Julia Castro     *Route this note to the Cancer Center Dietician once completed.    Patient referred to [facility] Dietician for :           [x]Weight Loss Patient lost:    8    LBS in :  since hiatal hernia repair on 10/29/24           [] Head and Neck Cancer           [] Upper GI Cancers           [] Malnutrition Screening Tool (MST) > 2           [] Painful or difficult swallowing           [x]  Diarrhea.3-4 episodes per week with abdominal cramping  since hiatal hernia repair. Denies nausea or vomiting.           [] Enteral or Parenteral Nutrition           [] Patient Request     Per request of Dr. Boggs         Patient Status:           []On Treatment Patient            [] Pre-Treatment (Newly Consulted)    Patient is a follow up visit on 6/26/25         Other Pertinent Details:    For follow up patients:     [] Offered an appointment with dietitian      []  Refused appointment     []  Accepted appointment     [x]  Notified physician

## 2025-06-30 ENCOUNTER — OFFICE VISIT (OUTPATIENT)
Age: 70
End: 2025-06-30
Payer: COMMERCIAL

## 2025-06-30 VITALS
SYSTOLIC BLOOD PRESSURE: 126 MMHG | HEIGHT: 67 IN | TEMPERATURE: 97.1 F | BODY MASS INDEX: 33.27 KG/M2 | WEIGHT: 212 LBS | DIASTOLIC BLOOD PRESSURE: 70 MMHG | HEART RATE: 71 BPM | OXYGEN SATURATION: 98 %

## 2025-06-30 DIAGNOSIS — E11.22 TYPE 2 DIABETES MELLITUS WITH STAGE 3A CHRONIC KIDNEY DISEASE, WITHOUT LONG-TERM CURRENT USE OF INSULIN (HCC): ICD-10-CM

## 2025-06-30 DIAGNOSIS — Z12.31 BREAST CANCER SCREENING BY MAMMOGRAM: ICD-10-CM

## 2025-06-30 DIAGNOSIS — N18.31 TYPE 2 DIABETES MELLITUS WITH STAGE 3A CHRONIC KIDNEY DISEASE, WITHOUT LONG-TERM CURRENT USE OF INSULIN (HCC): ICD-10-CM

## 2025-06-30 DIAGNOSIS — Z00.00 MEDICARE ANNUAL WELLNESS VISIT, SUBSEQUENT: Primary | ICD-10-CM

## 2025-06-30 PROCEDURE — 1160F RVW MEDS BY RX/DR IN RCRD: CPT | Performed by: FAMILY MEDICINE

## 2025-06-30 PROCEDURE — 1123F ACP DISCUSS/DSCN MKR DOCD: CPT | Performed by: FAMILY MEDICINE

## 2025-06-30 PROCEDURE — 3074F SYST BP LT 130 MM HG: CPT | Performed by: FAMILY MEDICINE

## 2025-06-30 PROCEDURE — G0439 PPPS, SUBSEQ VISIT: HCPCS | Performed by: FAMILY MEDICINE

## 2025-06-30 PROCEDURE — 3078F DIAST BP <80 MM HG: CPT | Performed by: FAMILY MEDICINE

## 2025-06-30 PROCEDURE — 1159F MED LIST DOCD IN RCRD: CPT | Performed by: FAMILY MEDICINE

## 2025-06-30 PROCEDURE — 3051F HG A1C>EQUAL 7.0%<8.0%: CPT | Performed by: FAMILY MEDICINE

## 2025-06-30 RX ORDER — GLIPIZIDE 10 MG/1
10 TABLET, FILM COATED, EXTENDED RELEASE ORAL DAILY
Qty: 90 TABLET | Refills: 1 | Status: SHIPPED | OUTPATIENT
Start: 2025-06-30

## 2025-06-30 RX ORDER — GLIPIZIDE 5 MG/1
5 TABLET, FILM COATED, EXTENDED RELEASE ORAL DAILY
Qty: 90 TABLET | Refills: 2 | Status: CANCELLED | OUTPATIENT
Start: 2025-06-30

## 2025-06-30 RX ORDER — GLIPIZIDE 2.5 MG/1
5 TABLET, EXTENDED RELEASE ORAL DAILY
Qty: 180 TABLET | Refills: 2 | Status: CANCELLED | OUTPATIENT
Start: 2025-06-30

## 2025-06-30 ASSESSMENT — PATIENT HEALTH QUESTIONNAIRE - PHQ9
10. IF YOU CHECKED OFF ANY PROBLEMS, HOW DIFFICULT HAVE THESE PROBLEMS MADE IT FOR YOU TO DO YOUR WORK, TAKE CARE OF THINGS AT HOME, OR GET ALONG WITH OTHER PEOPLE: NOT DIFFICULT AT ALL
SUM OF ALL RESPONSES TO PHQ QUESTIONS 1-9: 0
5. POOR APPETITE OR OVEREATING: NOT AT ALL
1. LITTLE INTEREST OR PLEASURE IN DOING THINGS: NOT AT ALL
9. THOUGHTS THAT YOU WOULD BE BETTER OFF DEAD, OR OF HURTING YOURSELF: NOT AT ALL
8. MOVING OR SPEAKING SO SLOWLY THAT OTHER PEOPLE COULD HAVE NOTICED. OR THE OPPOSITE, BEING SO FIGETY OR RESTLESS THAT YOU HAVE BEEN MOVING AROUND A LOT MORE THAN USUAL: NOT AT ALL
3. TROUBLE FALLING OR STAYING ASLEEP: NOT AT ALL
6. FEELING BAD ABOUT YOURSELF - OR THAT YOU ARE A FAILURE OR HAVE LET YOURSELF OR YOUR FAMILY DOWN: NOT AT ALL
SUM OF ALL RESPONSES TO PHQ QUESTIONS 1-9: 0
SUM OF ALL RESPONSES TO PHQ QUESTIONS 1-9: 0
2. FEELING DOWN, DEPRESSED OR HOPELESS: NOT AT ALL
7. TROUBLE CONCENTRATING ON THINGS, SUCH AS READING THE NEWSPAPER OR WATCHING TELEVISION: NOT AT ALL
4. FEELING TIRED OR HAVING LITTLE ENERGY: NOT AT ALL
SUM OF ALL RESPONSES TO PHQ QUESTIONS 1-9: 0

## 2025-06-30 ASSESSMENT — LIFESTYLE VARIABLES
HOW OFTEN DO YOU HAVE A DRINK CONTAINING ALCOHOL: NEVER
HOW MANY STANDARD DRINKS CONTAINING ALCOHOL DO YOU HAVE ON A TYPICAL DAY: PATIENT DOES NOT DRINK
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
## 2025-06-30 NOTE — PATIENT INSTRUCTIONS
Learning About Being Active as an Older Adult  Why is being active important as you get older?     Being active is one of the best things you can do for your health. And it's never too late to start. Being active--or getting active, if you aren't already--has definite benefits. It can:  Give you more energy,  Keep your mind sharp.  Improve balance to reduce your risk of falls.  Help you manage chronic illness with fewer medicines.  No matter how old you are, how fit you are, or what health problems you have, there is a form of activity that will work for you. And the more physical activity you can do, the better your overall health will be.  What kinds of activity can help you stay healthy?  Being more active will make your daily activities easier. Physical activity includes planned exercise and things you do in daily life. There are four types of activity:  Aerobic.  Doing aerobic activity makes your heart and lungs strong.  Includes walking, dancing, and gardening.  Aim for at least 2½ hours spread throughout the week.  It improves your energy and can help you sleep better.  Muscle-strengthening.  This type of activity can help maintain muscle and strengthen bones.  Includes climbing stairs, using resistance bands, and lifting or carrying heavy loads.  Aim for at least twice a week.  It can help protect the knees and other joints.  Stretching.  Stretching gives you better range of motion in joints and muscles.  Includes upper arm stretches, calf stretches, and gentle yoga.  Aim for at least twice a week, preferably after your muscles are warmed up from other activities.  It can help you function better in daily life.  Balancing.  This helps you stay coordinated and have good posture.  Includes heel-to-toe walking, jacek chi, and certain types of yoga.  Aim for at least 3 days a week.  It can reduce your risk of falling.  Even if you have a hard time meeting the recommendations, it's better to be more active

## 2025-07-01 ENCOUNTER — HOSPITAL ENCOUNTER (OUTPATIENT)
Dept: WOMENS IMAGING | Age: 70
Discharge: HOME | End: 2025-07-03
Attending: FAMILY MEDICINE
Payer: COMMERCIAL

## 2025-07-01 DIAGNOSIS — Z12.31 BREAST CANCER SCREENING BY MAMMOGRAM: ICD-10-CM

## 2025-07-01 PROCEDURE — 77063 BREAST TOMOSYNTHESIS BI: CPT

## 2025-07-06 ENCOUNTER — RESULTS FOLLOW-UP (OUTPATIENT)
Age: 70
End: 2025-07-06

## 2025-07-07 ENCOUNTER — HOSPITAL ENCOUNTER (OUTPATIENT)
Dept: LAB | Age: 70
Discharge: HOME | End: 2025-07-07
Payer: COMMERCIAL

## 2025-07-07 DIAGNOSIS — E11.22 TYPE 2 DIABETES MELLITUS WITH STAGE 3A CHRONIC KIDNEY DISEASE, WITHOUT LONG-TERM CURRENT USE OF INSULIN (HCC): ICD-10-CM

## 2025-07-07 DIAGNOSIS — N18.31 TYPE 2 DIABETES MELLITUS WITH STAGE 3A CHRONIC KIDNEY DISEASE, WITHOUT LONG-TERM CURRENT USE OF INSULIN (HCC): ICD-10-CM

## 2025-07-07 DIAGNOSIS — Z00.00 MEDICARE ANNUAL WELLNESS VISIT, SUBSEQUENT: ICD-10-CM

## 2025-07-07 LAB
ALBUMIN SERPL-MCNC: 4 G/DL (ref 3.5–4.6)
ALP SERPL-CCNC: 113 U/L (ref 40–130)
ALT SERPL-CCNC: 11 U/L (ref 0–33)
ANION GAP SERPL CALCULATED.3IONS-SCNC: 11 MEQ/L (ref 9–15)
AST SERPL-CCNC: 16 U/L (ref 0–35)
BASOPHILS # BLD: 0 K/UL (ref 0–0.2)
BASOPHILS NFR BLD: 0.8 %
BILIRUB SERPL-MCNC: 0.6 MG/DL (ref 0.2–0.7)
BUN SERPL-MCNC: 18 MG/DL (ref 8–23)
CALCIUM SERPL-MCNC: 9.2 MG/DL (ref 8.5–9.9)
CHLORIDE SERPL-SCNC: 106 MEQ/L (ref 95–107)
CHOLEST SERPL-MCNC: 143 MG/DL (ref 0–199)
CO2 SERPL-SCNC: 24 MEQ/L (ref 20–31)
CREAT SERPL-MCNC: 1.3 MG/DL (ref 0.5–0.9)
EOSINOPHIL # BLD: 0.2 K/UL (ref 0–0.7)
EOSINOPHIL NFR BLD: 3.8 %
ERYTHROCYTE [DISTWIDTH] IN BLOOD BY AUTOMATED COUNT: 13.1 % (ref 11.5–14.5)
ESTIMATED AVERAGE GLUCOSE: 146 MG/DL
GLOBULIN SER CALC-MCNC: 2.7 G/DL (ref 2.3–3.5)
GLUCOSE SERPL-MCNC: 184 MG/DL (ref 70–99)
HBA1C MFR BLD: 6.7 % (ref 4–6)
HCT VFR BLD AUTO: 38.6 % (ref 37–47)
HDLC SERPL-MCNC: 62 MG/DL (ref 40–59)
HGB BLD-MCNC: 12.6 G/DL (ref 12–16)
LDL CHOLESTEROL: 47 MG/DL (ref 0–129)
LYMPHOCYTES # BLD: 0.9 K/UL (ref 1–4.8)
LYMPHOCYTES NFR BLD: 23.3 %
MCH RBC QN AUTO: 32.8 PG (ref 27–31.3)
MCHC RBC AUTO-ENTMCNC: 32.6 % (ref 33–37)
MCV RBC AUTO: 100.5 FL (ref 79.4–94.8)
MONOCYTES # BLD: 0.4 K/UL (ref 0.2–0.8)
MONOCYTES NFR BLD: 8.8 %
NEUTROPHILS # BLD: 2.5 K/UL (ref 1.4–6.5)
NEUTS SEG NFR BLD: 63 %
PLATELET # BLD AUTO: 241 K/UL (ref 130–400)
POTASSIUM SERPL-SCNC: 4.5 MEQ/L (ref 3.4–4.9)
PROT SERPL-MCNC: 6.7 G/DL (ref 6.3–8)
RBC # BLD AUTO: 3.84 M/UL (ref 4.2–5.4)
SODIUM SERPL-SCNC: 141 MEQ/L (ref 135–144)
TRIGLYCERIDE, FASTING: 169 MG/DL (ref 0–150)
WBC # BLD AUTO: 4 K/UL (ref 4.8–10.8)

## 2025-07-07 PROCEDURE — 83036 HEMOGLOBIN GLYCOSYLATED A1C: CPT

## 2025-07-07 PROCEDURE — 36415 COLL VENOUS BLD VENIPUNCTURE: CPT

## 2025-07-07 PROCEDURE — 85025 COMPLETE CBC W/AUTO DIFF WBC: CPT

## 2025-07-07 PROCEDURE — 80053 COMPREHEN METABOLIC PANEL: CPT

## 2025-07-07 PROCEDURE — 80061 LIPID PANEL: CPT

## 2025-07-16 ENCOUNTER — OFFICE VISIT (OUTPATIENT)
Age: 70
End: 2025-07-16
Payer: COMMERCIAL

## 2025-07-16 ENCOUNTER — CLINICAL DOCUMENTATION (OUTPATIENT)
Dept: NUTRITION | Age: 70
End: 2025-07-16

## 2025-07-16 VITALS
HEIGHT: 67 IN | HEART RATE: 62 BPM | TEMPERATURE: 98 F | OXYGEN SATURATION: 98 % | WEIGHT: 212 LBS | BODY MASS INDEX: 33.27 KG/M2

## 2025-07-16 DIAGNOSIS — R19.7 DIARRHEA, UNSPECIFIED TYPE: Primary | ICD-10-CM

## 2025-07-16 DIAGNOSIS — R19.7 ACUTE DIARRHEA: Primary | ICD-10-CM

## 2025-07-16 PROCEDURE — 99212 OFFICE O/P EST SF 10 MIN: CPT | Performed by: THORACIC SURGERY (CARDIOTHORACIC VASCULAR SURGERY)

## 2025-07-16 PROCEDURE — 1160F RVW MEDS BY RX/DR IN RCRD: CPT | Performed by: THORACIC SURGERY (CARDIOTHORACIC VASCULAR SURGERY)

## 2025-07-16 PROCEDURE — 1159F MED LIST DOCD IN RCRD: CPT | Performed by: THORACIC SURGERY (CARDIOTHORACIC VASCULAR SURGERY)

## 2025-07-16 PROCEDURE — 1123F ACP DISCUSS/DSCN MKR DOCD: CPT | Performed by: THORACIC SURGERY (CARDIOTHORACIC VASCULAR SURGERY)

## 2025-07-16 NOTE — PROGRESS NOTES
Patient is many months out from laparoscopic repair of a very symptomatic paraesophageal hernia with Nissen fundoplication.  Postoperatively the patient did wonderfully and at her postop visit she had complete resolution of symptoms.  She was eating regular food and having no bowel complaints other than some increased flatulence.  She was reassured that this is very common after the sort of a procedure due to the added resistance at the GE junction making belching a little more difficult for some people.  Unfortunately, several months later, she started having intermittent essentially explosive diarrhea.  Sometimes she will have normal days with normal bowel movements and sometimes she will have emergent diarrhea.  She has even had some accidents where she could not get to the bathroom in time.  Patient was referred back to see if this could be a sequelae of the surgery.    Upon further questioning she still has complete resolution of her preoperative symptoms as far as reflux goes.  She can eat regular food.  She is not sure if certain foods cause the diarrhea but on further questioning it may be fatty or greasy foods that cause the problem.    I reviewed a CAT scan of the abdomen that was taken recently to follow-up on her pelvic cancer.  There might be a slight herniation of the fundoplication.  I do not see any significant changes in the thoracic surgical area to suggest that it is an issue with my surgery.  I do not see any other significant intra-abdominal pathology.    I cannot relate the new onset diarrhea to her paraesophageal hernia repair.  She may have a new functional bowel issue and I think she should be referred to GI medicine for further evaluation.  In the meantime I reiterated that the increased gas from below is a very common after the sort of procedure and she will probably have that for the rest of her life.  I have also asked her to go home and try some greasy fatty meals to see if fatty foods

## 2025-07-16 NOTE — PROGRESS NOTES
Called pt to schedule dietitian appointment based on referral from Dr Boggs.  Pt defers c/s at this time, will see GI per Dr Callejas's recommendation. Pt states she will call after GI c/s if needed.

## 2025-07-17 ENCOUNTER — OFFICE VISIT (OUTPATIENT)
Dept: OBGYN CLINIC | Age: 70
End: 2025-07-17

## 2025-07-17 VITALS
SYSTOLIC BLOOD PRESSURE: 130 MMHG | HEIGHT: 67 IN | WEIGHT: 212 LBS | DIASTOLIC BLOOD PRESSURE: 84 MMHG | BODY MASS INDEX: 33.27 KG/M2

## 2025-07-17 DIAGNOSIS — C54.1 ENDOMETRIAL ADENOCARCINOMA (HCC): ICD-10-CM

## 2025-07-17 DIAGNOSIS — Z01.419 ENCOUNTER FOR WELL WOMAN EXAM WITH ROUTINE GYNECOLOGICAL EXAM: Primary | ICD-10-CM

## 2025-07-17 RX ORDER — NYSTATIN 100000 U/G
CREAM TOPICAL
Qty: 30 G | Refills: 5 | Status: SHIPPED | OUTPATIENT
Start: 2025-07-17

## 2025-07-17 ASSESSMENT — ENCOUNTER SYMPTOMS
CONSTIPATION: 0
ABDOMINAL DISTENTION: 0
SHORTNESS OF BREATH: 0
COUGH: 0
ABDOMINAL PAIN: 0
VOMITING: 0
WHEEZING: 0
DIARRHEA: 0
NAUSEA: 0
BLOOD IN STOOL: 0
SORE THROAT: 0

## 2025-07-17 NOTE — PROGRESS NOTES
Postmenopausal Annual     Julia Castro is a 69 y.o. year old  [id number] who presents today for her annual well woman exam.  The patient is not sexually active.  The patient has never been taking hormone replacement therapy. Patient denies post-menopausal vaginal bleeding.    The patient has regular exercise: not asked    Patient is s/p endometrial cancer Stage 1 underwent Total Abdominal Hysterectomy Bilateral Salpingo Oophorectomy LND  She was seen for annual in  and a lesion was noted at cuff with bleeding biopsy revealed Stage 4b endometrial cancer at vaginal cuff she underwent taxal/carbo along with radiation. She is no longer able to be seen by [facility] due to insurance last seen  no complaints     Vitals:  /84   Ht 1.702 m (5' 7\")   Wt 96.2 kg (212 lb)   LMP  (LMP Unknown)   BMI 33.20 kg/m²   Allergies:  Ciprofloxacin  Past Medical History:   Diagnosis Date    Allergic rhinitis     Chest pain, unspecified 2018    Endometrial cancer (HCC)     Endometrial cancer, grade I (HCC)     [facility]    History of therapeutic radiation     Hx antineoplastic chemo     Hypertension     Kidney problem     blockage in urethra and urine backs up has to get urethra dilated, has scar tissue    Type 2 diabetes mellitus with chronic kidney disease, without long-term current use of insulin (HCC) 2019     Past Surgical History:   Procedure Laterality Date    CATARACT REMOVAL Left 2020    CHOLECYSTECTOMY      COLONOSCOPY N/A 08/10/2022    COLORECTAL CANCER SCREENING, NOT HIGH RISK performed by Angela Skaggs MD at [facility] OR    GASTRIC FUNDOPLICATION N/A 10/29/2024    laparoscopic hiatal hernia repair with fundoplication WITH GASTROPEXY performed by Baljit Callejas MD at [facility] OR    HYSTERECTOMY (CERVIX STATUS UNKNOWN)      endometrial cancer stage !a    OVARY REMOVAL      URETHRAL STRICTURE DILATATION      Dr. Klein      Family History   Problem Relation Age

## 2025-07-25 DIAGNOSIS — G62.9 NEUROPATHY: ICD-10-CM

## 2025-07-25 DIAGNOSIS — M54.16 LUMBAR RADICULOPATHY: ICD-10-CM

## 2025-07-29 RX ORDER — PREGABALIN 50 MG/1
50 CAPSULE ORAL 3 TIMES DAILY
Qty: 90 CAPSULE | Refills: 2 | Status: SHIPPED | OUTPATIENT
Start: 2025-07-29 | End: 2025-10-27

## 2025-08-13 ENCOUNTER — HOSPITAL ENCOUNTER (OUTPATIENT)
Dept: LAB | Age: 70
Discharge: HOME | End: 2025-08-13
Payer: COMMERCIAL

## 2025-08-13 ENCOUNTER — OFFICE VISIT (OUTPATIENT)
Dept: GASTROENTEROLOGY | Age: 70
End: 2025-08-13
Payer: COMMERCIAL

## 2025-08-13 VITALS
BODY MASS INDEX: 33.9 KG/M2 | WEIGHT: 216 LBS | HEIGHT: 67 IN | DIASTOLIC BLOOD PRESSURE: 69 MMHG | HEART RATE: 68 BPM | SYSTOLIC BLOOD PRESSURE: 134 MMHG | OXYGEN SATURATION: 96 %

## 2025-08-13 DIAGNOSIS — R19.4 ALTERED BOWEL HABITS: ICD-10-CM

## 2025-08-13 DIAGNOSIS — R19.4 ALTERED BOWEL HABITS: Primary | ICD-10-CM

## 2025-08-13 PROCEDURE — 99203 OFFICE O/P NEW LOW 30 MIN: CPT | Performed by: SPECIALIST

## 2025-08-13 PROCEDURE — 36415 COLL VENOUS BLD VENIPUNCTURE: CPT

## 2025-08-13 PROCEDURE — 82746 ASSAY OF FOLIC ACID SERUM: CPT

## 2025-08-13 PROCEDURE — 83516 IMMUNOASSAY NONANTIBODY: CPT

## 2025-08-13 PROCEDURE — 82784 ASSAY IGA/IGD/IGG/IGM EACH: CPT

## 2025-08-13 PROCEDURE — 3075F SYST BP GE 130 - 139MM HG: CPT | Performed by: SPECIALIST

## 2025-08-13 PROCEDURE — 1159F MED LIST DOCD IN RCRD: CPT | Performed by: SPECIALIST

## 2025-08-13 PROCEDURE — 3078F DIAST BP <80 MM HG: CPT | Performed by: SPECIALIST

## 2025-08-13 PROCEDURE — 1123F ACP DISCUSS/DSCN MKR DOCD: CPT | Performed by: SPECIALIST

## 2025-08-13 PROCEDURE — 82607 VITAMIN B-12: CPT

## 2025-08-13 RX ORDER — BLOOD-GLUCOSE METER
EACH MISCELLANEOUS
COMMUNITY
Start: 2025-06-30

## 2025-08-13 RX ORDER — POLYETHYLENE GLYCOL 3350, SODIUM CHLORIDE, SODIUM BICARBONATE, POTASSIUM CHLORIDE 420; 11.2; 5.72; 1.48 G/4L; G/4L; G/4L; G/4L
4000 POWDER, FOR SOLUTION ORAL ONCE
Qty: 4000 ML | Refills: 0 | Status: SHIPPED | OUTPATIENT
Start: 2025-08-13 | End: 2025-08-13

## 2025-08-13 ASSESSMENT — ENCOUNTER SYMPTOMS
ANAL BLEEDING: 0
BLOOD IN STOOL: 0
DIARRHEA: 0
RESPIRATORY NEGATIVE: 1
NAUSEA: 0
ABDOMINAL DISTENTION: 0
RECTAL PAIN: 0
CONSTIPATION: 0
EYES NEGATIVE: 1
ABDOMINAL PAIN: 0
GASTROINTESTINAL NEGATIVE: 1
VOMITING: 0

## 2025-08-14 PROBLEM — R19.4 ALTERED BOWEL HABITS: Status: ACTIVE | Noted: 2025-08-14

## 2025-08-14 LAB
FOLATE: 19.2 NG/ML (ref 4.8–24.2)
VITAMIN B-12: >2000 PG/ML (ref 232–1245)

## 2025-08-16 LAB
GLIADIN IGA SER IA-ACNC: <0.72 FLU (ref 0–4.99)
IGA SERPL-MCNC: 233 MG/DL (ref 68–408)
TTG IGA SER IA-ACNC: <1.02 FLU (ref 0–4.99)

## (undated) DEVICE — TROCAR: Brand: KII® SLEEVE

## (undated) DEVICE — Device: Brand: ENDO SMARTCAP

## (undated) DEVICE — DEVICE SUT SHFT L34CM DIA 10MM 2 JAW LD UNIT ENDOSTCH

## (undated) DEVICE — RETRACTOR ENDOSCP SHFT L31MM DIA10MM TEAL ATRAUM ARTC 5

## (undated) DEVICE — TUBE ENDOSCP COLON CHANNEL

## (undated) DEVICE — ADAPTER FLSH PMP FLD MGMT GI IRRIG OFP 2 DISPOSABLE

## (undated) DEVICE — GAUZE 2X2IN ST BORDERED

## (undated) DEVICE — SUTURE ETHIBOND EXCEL SZ 0 L30IN NONABSORBABLE GRN L26MM CT-2 X412H

## (undated) DEVICE — DEVICE SUT SUT 2-0 L48IN NDL ES-9 GRN POLY BRAID LD UNIT

## (undated) DEVICE — TROCAR: Brand: KII FIOS FIRST ENTRY

## (undated) DEVICE — Device

## (undated) DEVICE — TUBING INSUFFLATION SMK EVAC HI FLO SET PNEUMOCLEAR

## (undated) DEVICE — SEALER LAP L37CM MARYLAND JAW OPN NANO COAT MULTIFUNCTIONAL

## (undated) DEVICE — SUTURE VICRYL + SZ 0 L27IN ABSRB VLT L26MM UR-6 5/8 CIR VCP603H

## (undated) DEVICE — TROCARS: Brand: KII® BALLOON BLUNT TIP SYSTEM

## (undated) DEVICE — MEDI-VAC NON-CONDUCTIVE SUCTION TUBING: Brand: CARDINAL HEALTH

## (undated) DEVICE — SINGLE PORT MANIFOLD: Brand: NEPTUNE 2

## (undated) DEVICE — TOWEL,OR,DSP,ST,BLUE,STD,4/PK,20PK/CS: Brand: MEDLINE

## (undated) DEVICE — DRESSING COMP W4XL4IN N ADH PD W2.5XL2.5IN GZ BORDERED ADH

## (undated) DEVICE — NEPTUNE E-SEP SMOKE EVACUATION PENCIL, COATED, 70MM BLADE, PUSH BUTTON SWITCH: Brand: NEPTUNE E-SEP

## (undated) DEVICE — TRAYS TRANSPORT SCOPE OASIS W/LID

## (undated) DEVICE — BW-412T DISP COMBO CLEANING BRUSH: Brand: SINGLE USE COMBINATION CLEANING BRUSH

## (undated) DEVICE — DEVICE SUT 0 L48IN GRN POLY BRAID LD UNIT DISP SURGDAC

## (undated) DEVICE — THORACIC: Brand: MEDLINE INDUSTRIES, INC.

## (undated) DEVICE — ENDO CARRY-ON PROCEDURE KIT INCLUDES LUBRICANT, DEFENDO OLYMPUS AIR, WATER, SUCTION, BIOPSY VALVE KIT, ENZYMATIC SPONGE, AND BASIN.: Brand: ENDO CARRY-ON PROCEDURE KIT

## (undated) DEVICE — 4-PORT MANIFOLD: Brand: NEPTUNE 2